# Patient Record
Sex: FEMALE | Race: WHITE | Employment: FULL TIME | URBAN - METROPOLITAN AREA
[De-identification: names, ages, dates, MRNs, and addresses within clinical notes are randomized per-mention and may not be internally consistent; named-entity substitution may affect disease eponyms.]

---

## 2019-03-30 LAB
AVERAGE GLUCOSE: NORMAL
CREATININE, URINE: 118.49
HBA1C MFR BLD: 11.1 %
MICROALBUMIN/CREAT 24H UR: 2.5 MG/G{CREAT}
MICROALBUMIN/CREAT UR-RTO: 21.1

## 2020-02-03 ENCOUNTER — HOSPITAL ENCOUNTER (OUTPATIENT)
Age: 50
Discharge: HOME | End: 2020-02-03
Payer: COMMERCIAL

## 2020-02-03 LAB — RUBV IGG SER QL: 32.5 IU/ML

## 2020-02-03 PROCEDURE — 86787 VARICELLA-ZOSTER ANTIBODY: CPT

## 2020-02-03 PROCEDURE — 86481 TB AG RESPONSE T-CELL SUSP: CPT

## 2020-02-03 PROCEDURE — 86765 RUBEOLA ANTIBODY: CPT

## 2020-02-03 PROCEDURE — 86762 RUBELLA ANTIBODY: CPT

## 2020-02-03 PROCEDURE — 86735 MUMPS ANTIBODY: CPT

## 2020-02-05 LAB
MEASLES IMMUNE (IGG): 0.63
MUV IGG SER QL: 0.47
VZV IGG SER QL IA: 2.59

## 2020-02-06 LAB — T-SPOT TB TEST: NORMAL

## 2020-02-27 ENCOUNTER — HOSPITAL ENCOUNTER (OUTPATIENT)
Age: 50
Discharge: HOME | End: 2020-02-27
Payer: COMMERCIAL

## 2020-02-27 LAB — HCG, PREGNANCY URINE (POC): NEGATIVE

## 2020-02-27 PROCEDURE — 86481 TB AG RESPONSE T-CELL SUSP: CPT

## 2020-02-27 PROCEDURE — 81025 URINE PREGNANCY TEST: CPT

## 2020-03-01 LAB — T-SPOT TB TEST: NORMAL

## 2020-03-23 RX ORDER — INSULIN LISPRO 100 [IU]/ML
10 INJECTION, SOLUTION INTRAVENOUS; SUBCUTANEOUS
COMMUNITY
Start: 2019-05-22 | End: 2020-03-23 | Stop reason: SDUPTHER

## 2020-03-23 RX ORDER — ATORVASTATIN CALCIUM 20 MG/1
20 TABLET, FILM COATED ORAL DAILY
COMMUNITY
Start: 2019-05-28 | End: 2020-03-23 | Stop reason: SDUPTHER

## 2020-03-23 RX ORDER — GEMFIBROZIL 600 MG/1
TABLET, FILM COATED ORAL
COMMUNITY
Start: 2019-08-07 | End: 2020-03-23 | Stop reason: SDUPTHER

## 2020-03-23 RX ORDER — LISINOPRIL AND HYDROCHLOROTHIAZIDE 12.5; 1 MG/1; MG/1
1 TABLET ORAL DAILY
COMMUNITY
Start: 2019-05-22 | End: 2020-03-23 | Stop reason: SDUPTHER

## 2020-03-23 RX ORDER — INSULIN GLARGINE 100 [IU]/ML
INJECTION, SOLUTION SUBCUTANEOUS
COMMUNITY
Start: 2020-02-03 | End: 2020-03-23 | Stop reason: SDUPTHER

## 2020-03-23 RX ORDER — SPIRONOLACTONE 25 MG/1
25 TABLET ORAL DAILY
COMMUNITY
Start: 2019-05-22 | End: 2020-03-23 | Stop reason: SDUPTHER

## 2020-03-23 RX ORDER — LEVOTHYROXINE SODIUM 88 UG/1
TABLET ORAL
COMMUNITY
Start: 2019-11-25 | End: 2020-03-23 | Stop reason: SDUPTHER

## 2020-03-23 RX ORDER — GLIPIZIDE 10 MG/1
10 TABLET ORAL
COMMUNITY
Start: 2019-05-22 | End: 2020-03-23 | Stop reason: SDUPTHER

## 2020-03-24 ENCOUNTER — TELEPHONE (OUTPATIENT)
Dept: PHARMACY | Facility: CLINIC | Age: 50
End: 2020-03-24

## 2020-03-25 NOTE — TELEPHONE ENCOUNTER
Per [facility]:  DX: DM Type Two  2/19/19: DM OV: Lainey Roman, APRN-CNP  5/30/19: DM ED: JOSHUA Grossman

## 2020-04-06 NOTE — TELEPHONE ENCOUNTER
Received patients [address] [place] [address] enrollment form and Page one of the DM Program application. [address] form faxed to [address] [place] [address] with confirmation received. E-mail sent to patient advising her of the above information and requesting the complete DM Program application before 3/18/41 for enrollment into the program on 7/01/20.

## 2020-04-17 RX ORDER — ATORVASTATIN CALCIUM 20 MG/1
20 TABLET, FILM COATED ORAL DAILY
Qty: 30 TABLET | Refills: 1 | Status: SHIPPED | OUTPATIENT
Start: 2020-04-17 | End: 2020-07-14 | Stop reason: SDUPTHER

## 2020-04-17 RX ORDER — GEMFIBROZIL 600 MG/1
600 TABLET, FILM COATED ORAL 2 TIMES DAILY
Qty: 60 TABLET | Refills: 1 | Status: SHIPPED | OUTPATIENT
Start: 2020-04-17 | End: 2020-08-17

## 2020-04-17 RX ORDER — LEVOTHYROXINE SODIUM 88 UG/1
TABLET ORAL
Qty: 30 TABLET | Refills: 1 | Status: SHIPPED | OUTPATIENT
Start: 2020-04-17 | End: 2020-08-17 | Stop reason: SDUPTHER

## 2020-04-17 RX ORDER — LISINOPRIL AND HYDROCHLOROTHIAZIDE 12.5; 1 MG/1; MG/1
1 TABLET ORAL DAILY
Qty: 30 TABLET | Refills: 1 | Status: SHIPPED | OUTPATIENT
Start: 2020-04-17 | End: 2020-08-17 | Stop reason: SDUPTHER

## 2020-04-17 RX ORDER — SPIRONOLACTONE 25 MG/1
25 TABLET ORAL DAILY
Qty: 30 TABLET | Refills: 1 | Status: SHIPPED | OUTPATIENT
Start: 2020-04-17 | End: 2020-08-17

## 2020-04-17 RX ORDER — GLIPIZIDE 10 MG/1
10 TABLET ORAL
Qty: 60 TABLET | Refills: 1 | Status: SHIPPED | OUTPATIENT
Start: 2020-04-17 | End: 2020-08-17

## 2020-04-17 NOTE — TELEPHONE ENCOUNTER
Patient requesting 90 day supply as it is most cost effective. Per patient benefit, [facility] is now the preferred pharmacy for all maintenance medications. Please update patient's preferred pharmacy in your records. Please call [phone] with any questions.

## 2020-04-30 ENCOUNTER — TELEMEDICINE (OUTPATIENT)
Dept: FAMILY MEDICINE CLINIC | Age: 50
End: 2020-04-30
Payer: COMMERCIAL

## 2020-04-30 VITALS — BODY MASS INDEX: 34.8 KG/M2 | WEIGHT: 235 LBS | HEIGHT: 69 IN

## 2020-04-30 PROCEDURE — 99202 OFFICE O/P NEW SF 15 MIN: CPT | Performed by: INTERNAL MEDICINE

## 2020-04-30 RX ORDER — LANCETS 30 GAUGE
1 EACH MISCELLANEOUS 3 TIMES DAILY
Qty: 600 EACH | Refills: 1 | Status: SHIPPED | OUTPATIENT
Start: 2020-04-30 | End: 2020-09-25 | Stop reason: SDUPTHER

## 2020-04-30 RX ORDER — BLOOD-GLUCOSE METER
1 KIT MISCELLANEOUS 3 TIMES DAILY
Qty: 1 KIT | Refills: 0 | Status: SHIPPED | OUTPATIENT
Start: 2020-04-30 | End: 2020-09-27 | Stop reason: SDUPTHER

## 2020-04-30 RX ORDER — PEN NEEDLE, DIABETIC 31 GX5/16"
1 NEEDLE, DISPOSABLE MISCELLANEOUS DAILY
Qty: 100 EACH | Refills: 11 | Status: SHIPPED | OUTPATIENT
Start: 2020-04-30 | End: 2020-08-17 | Stop reason: SDUPTHER

## 2020-04-30 ASSESSMENT — ENCOUNTER SYMPTOMS
CONSTIPATION: 0
SHORTNESS OF BREATH: 0
NAUSEA: 0
BLOOD IN STOOL: 0
BACK PAIN: 0
CHOKING: 0
ABDOMINAL PAIN: 0
CHEST TIGHTNESS: 0
COUGH: 0
ANAL BLEEDING: 0
RECTAL PAIN: 0
WHEEZING: 0
DIARRHEA: 0

## 2020-04-30 ASSESSMENT — PATIENT HEALTH QUESTIONNAIRE - PHQ9
2. FEELING DOWN, DEPRESSED OR HOPELESS: 0
SUM OF ALL RESPONSES TO PHQ QUESTIONS 1-9: 0
SUM OF ALL RESPONSES TO PHQ QUESTIONS 1-9: 0
SUM OF ALL RESPONSES TO PHQ9 QUESTIONS 1 & 2: 0
1. LITTLE INTEREST OR PLEASURE IN DOING THINGS: 0

## 2020-05-04 ENCOUNTER — TELEPHONE (OUTPATIENT)
Dept: FAMILY MEDICINE CLINIC | Age: 50
End: 2020-05-04

## 2020-05-04 NOTE — TELEPHONE ENCOUNTER
Pharmacy called stating that the lancets was prescribed to the patient because the other rx is not covered by patients insurance.

## 2020-06-04 NOTE — TELEPHONE ENCOUNTER
Tk20 account created. Patient still missing DM Program Application and Lipid Panel - ordered on 4/30/20.     E-mail and DM Program Applications sent to patient at the e-mail on file: [email or web address].

## 2020-06-26 ENCOUNTER — HOSPITAL ENCOUNTER (OUTPATIENT)
Age: 50
Discharge: HOME | End: 2020-06-26
Payer: COMMERCIAL

## 2020-06-26 ENCOUNTER — HOSPITAL ENCOUNTER (OUTPATIENT)
Dept: MAMMOGRAPHY | Age: 50
Discharge: HOME | End: 2020-06-28
Payer: COMMERCIAL

## 2020-06-26 LAB
ALBUMIN SERPL-MCNC: 4.2 G/DL (ref 3.5–5.2)
ALBUMIN/GLOBULIN RATIO: 1.4 (ref 1–2.5)
ALP BLD-CCNC: 80 U/L (ref 35–104)
ALT SERPL-CCNC: 24 U/L (ref 5–33)
ANION GAP SERPL CALCULATED.3IONS-SCNC: 17 MMOL/L (ref 9–17)
AST SERPL-CCNC: 20 U/L
BILIRUB SERPL-MCNC: 0.24 MG/DL (ref 0.3–1.2)
BUN BLDV-MCNC: 13 MG/DL (ref 6–20)
BUN/CREAT BLD: ABNORMAL (ref 9–20)
CALCIUM SERPL-MCNC: 9.4 MG/DL (ref 8.6–10.4)
CHLORIDE BLD-SCNC: 94 MMOL/L (ref 98–107)
CHOLESTEROL, FASTING: 238 MG/DL
CHOLESTEROL/HDL RATIO: 11.3
CO2: 19 MMOL/L (ref 20–31)
CREAT SERPL-MCNC: 0.49 MG/DL (ref 0.5–0.9)
CREATININE URINE: 70.9 MG/DL (ref 28–217)
ESTIMATED AVERAGE GLUCOSE: 295 MG/DL
GFR AFRICAN AMERICAN: >60 ML/MIN
GFR NON-AFRICAN AMERICAN: >60 ML/MIN
GFR SERPL CREATININE-BSD FRML MDRD: ABNORMAL ML/MIN/{1.73_M2}
GFR SERPL CREATININE-BSD FRML MDRD: ABNORMAL ML/MIN/{1.73_M2}
GLUCOSE BLD-MCNC: 337 MG/DL (ref 70–99)
HBA1C MFR BLD: 11.9 % (ref 4–6)
HCT VFR BLD CALC: 37.5 % (ref 36.3–47.1)
HDLC SERPL-MCNC: 21 MG/DL
HEMOGLOBIN: 13.2 G/DL (ref 11.9–15.1)
LDL CHOLESTEROL DIRECT: 52 MG/DL
LDL CHOLESTEROL: ABNORMAL MG/DL (ref 0–130)
MCH RBC QN AUTO: 28.9 PG (ref 25.2–33.5)
MCHC RBC AUTO-ENTMCNC: 35.2 G/DL (ref 28.4–34.8)
MCV RBC AUTO: 82.1 FL (ref 82.6–102.9)
MICROALBUMIN/CREAT 24H UR: 68 MG/L
MICROALBUMIN/CREAT UR-RTO: 96 MCG/MG CREAT
NRBC AUTOMATED: 0 PER 100 WBC
PDW BLD-RTO: 12.9 % (ref 11.8–14.4)
PLATELET # BLD: 331 K/UL (ref 138–453)
PMV BLD AUTO: 9.8 FL (ref 8.1–13.5)
POTASSIUM SERPL-SCNC: 4 MMOL/L (ref 3.7–5.3)
RBC # BLD: 4.57 M/UL (ref 3.95–5.11)
SODIUM BLD-SCNC: 130 MMOL/L (ref 135–144)
TOTAL PROTEIN: 7.1 G/DL (ref 6.4–8.3)
TRIGLYCERIDE, FASTING: 2064 MG/DL
VLDLC SERPL CALC-MCNC: ABNORMAL MG/DL (ref 1–30)
WBC # BLD: 7.1 K/UL (ref 3.5–11.3)

## 2020-06-26 PROCEDURE — 36415 COLL VENOUS BLD VENIPUNCTURE: CPT

## 2020-06-26 PROCEDURE — 82043 UR ALBUMIN QUANTITATIVE: CPT

## 2020-06-26 PROCEDURE — 85027 COMPLETE CBC AUTOMATED: CPT

## 2020-06-26 PROCEDURE — 77063 BREAST TOMOSYNTHESIS BI: CPT

## 2020-06-26 PROCEDURE — 83036 HEMOGLOBIN GLYCOSYLATED A1C: CPT

## 2020-06-26 PROCEDURE — 83721 ASSAY OF BLOOD LIPOPROTEIN: CPT

## 2020-06-26 PROCEDURE — 82570 ASSAY OF URINE CREATININE: CPT

## 2020-06-26 PROCEDURE — 80053 COMPREHEN METABOLIC PANEL: CPT

## 2020-06-26 PROCEDURE — 80061 LIPID PANEL: CPT

## 2020-07-14 ENCOUNTER — OFFICE VISIT (OUTPATIENT)
Dept: FAMILY MEDICINE CLINIC | Age: 50
End: 2020-07-14
Payer: COMMERCIAL

## 2020-07-14 VITALS
OXYGEN SATURATION: 97 % | SYSTOLIC BLOOD PRESSURE: 132 MMHG | HEIGHT: 69 IN | BODY MASS INDEX: 34.07 KG/M2 | TEMPERATURE: 95.6 F | HEART RATE: 73 BPM | WEIGHT: 230 LBS | RESPIRATION RATE: 16 BRPM | DIASTOLIC BLOOD PRESSURE: 76 MMHG

## 2020-07-14 PROCEDURE — 99214 OFFICE O/P EST MOD 30 MIN: CPT | Performed by: INTERNAL MEDICINE

## 2020-07-14 RX ORDER — INSULIN GLARGINE 100 [IU]/ML
40 INJECTION, SOLUTION SUBCUTANEOUS 2 TIMES DAILY
Qty: 15 PEN | Refills: 5 | Status: SHIPPED | OUTPATIENT
Start: 2020-07-14 | End: 2020-09-29 | Stop reason: SDUPTHER

## 2020-07-14 RX ORDER — ATORVASTATIN CALCIUM 40 MG/1
40 TABLET, FILM COATED ORAL DAILY
Qty: 30 TABLET | Refills: 3 | Status: SHIPPED | OUTPATIENT
Start: 2020-07-14 | End: 2020-07-14 | Stop reason: SDUPTHER

## 2020-07-14 RX ORDER — ATORVASTATIN CALCIUM 40 MG/1
40 TABLET, FILM COATED ORAL DAILY
Qty: 90 TABLET | Refills: 5 | Status: SHIPPED | OUTPATIENT
Start: 2020-07-14 | End: 2021-01-21

## 2020-07-14 RX ORDER — FLUCONAZOLE 100 MG/1
100 TABLET ORAL DAILY
Qty: 3 TABLET | Refills: 5 | Status: SHIPPED | OUTPATIENT
Start: 2020-07-14 | End: 2020-07-17

## 2020-07-14 RX ORDER — INSULIN GLARGINE 100 [IU]/ML
40 INJECTION, SOLUTION SUBCUTANEOUS 2 TIMES DAILY
Qty: 5 PEN | Refills: 5 | Status: SHIPPED | OUTPATIENT
Start: 2020-07-14 | End: 2020-07-14 | Stop reason: SDUPTHER

## 2020-07-14 ASSESSMENT — ENCOUNTER SYMPTOMS
WHEEZING: 0
ABDOMINAL PAIN: 0
COUGH: 0
CONSTIPATION: 0
STRIDOR: 0
SORE THROAT: 0
SHORTNESS OF BREATH: 0
DIARRHEA: 0
NAUSEA: 0
CHEST TIGHTNESS: 0
ANAL BLEEDING: 0
CHOKING: 0
VOMITING: 0
BACK PAIN: 0
ABDOMINAL DISTENTION: 0
BLOOD IN STOOL: 0
BLURRED VISION: 1

## 2020-07-14 NOTE — PROGRESS NOTES
Visit Information    Have you changed or started any medications since your last visit including any over-the-counter medicines, vitamins, or herbal medicines? no   Are you having any side effects from any of your medications? -  no  Have you stopped taking any of your medications? Is so, why? -  no    Have you seen any other physician or provider since your last visit? No  Have you had any other diagnostic tests since your last visit? No  Have you been seen in the emergency room and/or had an admission to a hospital since we last saw you? No  Have you had your routine dental cleaning in the past 6 months? no    Have you activated your [employer] account? If not, what are your barriers?  Yes     Patient Care Team:  Kateryna Humphries DO as PCP - General (Family Medicine)  Kateryna Humphries DO as PCP - [facility]    Medical History Review  Past Medical, Family, and Social History reviewed and does contribute to the patient presenting condition    Health Maintenance   Topic Date Due    Diabetic foot exam  03/28/1981    Diabetic retinal exam  03/28/1981    HIV screen  03/28/1986    Cervical cancer screen  03/28/1992    Hepatitis B vaccine (2 of 3 - Risk 3-dose series) 03/08/1993    Flu vaccine (1) 09/01/2020    A1C test (Diabetic or Prediabetic)  09/26/2020    Diabetic microalbuminuria test  06/26/2021    Lipid screen  06/26/2021    Potassium monitoring  06/26/2021    Creatinine monitoring  06/26/2021    DTaP/Tdap/Td vaccine (4 - Td) 02/26/2030    Pneumococcal 0-64 years Vaccine  Completed    Hepatitis A vaccine  Aged Out    Hib vaccine  Aged Out    Meningococcal (ACWY) vaccine  Aged Out

## 2020-07-14 NOTE — PROGRESS NOTES
[address] [facility]  [address] [place] [address]  Dept: [phone]  Dept Fax: [phone]    Araceli Valenzuela a 53 y.o. female who presents today for her medical conditions/complaints as notedbelow. 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 so would get fast food       Also has bene getting recurrent yeast infections typically second day of period that become severe to cuase raw and itchy skin   Goes all the way to her bottom   Just started doing nightly walks every night for about 2.5 miles so getting back to routine   Had blood work about 3 weeks ago now and a1c was 11.9  tgs were over 2000, it does not appear they have ever been this high but she states it is a chronic issue   Endo had started the lipitor, ldl normal   She is on lantus 60 units nightly, used to take bid   Also on glipizde and metformin   Had colten LINARES a few times but did not like her /get along with her     Diabetes   She presents for her follow-up diabetic visit. She has type 2 diabetes mellitus. Her disease course has been worsening. There are no hypoglycemic associated symptoms. Pertinent negatives for hypoglycemia include no dizziness or headaches. Associated symptoms include blurred vision and polydipsia. Pertinent negatives for diabetes include no chest pain, no fatigue, no foot ulcerations, no polyphagia and no polyuria. There are no hypoglycemic complications. There are no diabetic complications.  Risk factors for coronary artery disease include dyslipidemia, diabetes mellitus, family history, obesity, hypertension, stress and sedentary lifestyle. Current diabetic treatment includes insulin injections and oral agent (dual therapy). She is compliant with treatment most of the time. Her weight is stable. She is following a generally unhealthy diet. Meal planning includes avoidance of concentrated sweets. She has not had a previous visit with a dietitian. She participates in exercise daily. An ACE inhibitor/angiotensin II receptor blocker is being taken. Hyperlipidemia   This is a chronic problem. The current episode started more than 1 year ago. The problem is uncontrolled. Recent lipid tests were reviewed and are variable. Exacerbating diseases include diabetes and obesity. She has no history of chronic renal disease, hypothyroidism or liver disease. Factors aggravating her hyperlipidemia include thiazides and fatty foods. Pertinent negatives include no chest pain or shortness of breath. Current antihyperlipidemic treatment includes statins, exercise and bile acid squestrants. The current treatment provides moderate improvement of lipids. Compliance problems include adherence to diet and psychosocial issues. Risk factors for coronary artery disease include diabetes mellitus, dyslipidemia, family history, hypertension, obesity, stress and a sedentary lifestyle. Vaginal Discharge   The patient's primary symptoms include genital itching, a genital odor, a genital rash and vaginal discharge. The patient's pertinent negatives include no genital lesions, missed menses or pelvic pain. This is a recurrent problem. The current episode started more than 1 month ago. The problem occurs intermittently. The problem has been gradually worsening. The pain is severe. The problem affects both sides. She is not pregnant. Associated symptoms include frequency.  Pertinent negatives include no abdominal pain, anorexia, back pain, chills, constipation, diarrhea, discolored urine, fever, headaches, joint swelling, nausea, painful intercourse, rash, sore throat, urgency or vomiting. The vaginal discharge was white and thick. The vaginal bleeding is typical of menses. She has not been passing clots. She has not been passing tissue. She has tried antifungals and heating pads for the symptoms. The treatment provided mild relief. She is sexually active. No, her partner does not have an STD. Her menstrual history has been regular. There is no history of an abdominal surgery, a  section, an ectopic pregnancy, endometriosis, a gynecological surgery, menorrhagia, metrorrhagia or miscarriage. Hemoglobin A1C (%)   Date Value   2020 11.9 (H)   2019 11.1         ( goal A1C is < 7)   Microalb/Crt. Ratio (mcg/mg creat)   Date Value   2020 96 (H)     LDL Cholesterol (mg/dL)   Date Value   2020            (goal LDL is <100)   AST (U/L)   Date Value   2020 20     ALT (U/L)   Date Value   2020 24     BUN (mg/dL)   Date Value   2020 13     BP Readings from Last 3 Encounters:   20 132/76          (goal 120/80)    No past medical history on file. No past surgical history on file. No family history on file. Social History     Tobacco Use    Smoking status: Not on file   Substance Use Topics    Alcohol use: Not on file      Current Outpatient Medications   Medication Sig Dispense Refill    fluconazole (DIFLUCAN) 100 MG tablet Take 1 tablet by mouth daily for 3 days 3 tablet 5    insulin glargine (LANTUS SOLOSTAR) 100 UNIT/ML injection pen Inject 40 Units into the skin 2 times daily 15 pen 5    atorvastatin (LIPITOR) 40 MG tablet Take 1 tablet by mouth daily 90 tablet 5    Omeprazole 20 MG TBDD Take 20 mg by mouth daily 30 tablet 1    glucose monitoring kit (FREESTYLE) monitoring kit 1 kit by Does not apply route 3 times daily 1 kit 0    blood glucose test strips (ASCENSIA AUTODISC VI;ONE TOUCH ULTRA TEST VI) strip Use with associated glucose meter.  600 strip 11    Lancets MISC 1 blurred vision. Negative for visual disturbance. Respiratory: Negative for cough, choking, chest tightness, shortness of breath, wheezing and stridor. Cardiovascular: Negative for chest pain, palpitations and leg swelling. Gastrointestinal: Negative for abdominal distention, abdominal pain, anal bleeding, anorexia, blood in stool, constipation, diarrhea, nausea and vomiting. Endocrine: Positive for polydipsia. Negative for polyphagia and polyuria. Genitourinary: Positive for frequency and vaginal discharge. Negative for menorrhagia, missed menses, pelvic pain and urgency. Musculoskeletal: Negative for arthralgias and back pain. Skin: Negative for rash. Allergic/Immunologic: Positive for immunocompromised state. Neurological: Negative for dizziness, light-headedness and headaches. Hematological: Negative for adenopathy. Does not bruise/bleed easily. Psychiatric/Behavioral: Negative for sleep disturbance. Objective:      Physical Exam  Vitals signs and nursing note reviewed. Constitutional:       General: She is not in acute distress. Appearance: Normal appearance. She is well-developed. She is obese. She is not ill-appearing, toxic-appearing or diaphoretic. Comments: Chronically ill  Thin    HENT:      Head: Normocephalic and atraumatic. Mouth/Throat:      Mouth: Mucous membranes are moist.   Neck:      Musculoskeletal: Normal range of motion and neck supple. No neck rigidity. Thyroid: No thyroid mass, thyromegaly or thyroid tenderness. Vascular: No carotid bruit or JVD. Trachea: Trachea and phonation normal.   Cardiovascular:      Rate and Rhythm: Normal rate and regular rhythm. No extrasystoles are present. Pulses: Normal pulses. Heart sounds: Normal heart sounds, S1 normal and S2 normal. Heart sounds not distant. No murmur. No friction rub. No gallop.     Pulmonary:      Effort: Pulmonary effort is normal. No bradypnea, accessory muscle usage, prolonged expiration, respiratory distress or retractions. Breath sounds: Normal breath sounds and air entry. No stridor, decreased air movement or transmitted upper airway sounds. No decreased breath sounds, wheezing, rhonchi or rales. Abdominal:      General: Bowel sounds are normal.      Palpations: Abdomen is soft. There is no hepatomegaly or splenomegaly. Tenderness: There is no abdominal tenderness. Musculoskeletal:      Right shoulder: She exhibits no tenderness, no bony tenderness, no pain, normal pulse and normal strength. Left knee: She exhibits normal range of motion, no swelling, no effusion, no ecchymosis, no deformity, no laceration and no erythema. No tenderness found. Lumbar back: She exhibits normal range of motion, no tenderness, no bony tenderness, no swelling, no edema, no deformity, no pain and no spasm. Right lower leg: No edema. Left lower leg: No edema. Lymphadenopathy:      Cervical: No cervical adenopathy. Skin:     General: Skin is warm and dry. Capillary Refill: Capillary refill takes less than 2 seconds. Findings: No rash. Neurological:      General: No focal deficit present. Mental Status: She is alert and oriented to person, place, and time. Cranial Nerves: Cranial nerves are intact. No cranial nerve deficit. Sensory: Sensory deficit present. Motor: Weakness, atrophy and abnormal muscle tone present. Coordination: Coordination is intact. Coordination normal.      Gait: Gait abnormal.      Deep Tendon Reflexes:      Reflex Scores:       Patellar reflexes are 1+ on the right side and 1+ on the left side. Achilles reflexes are 2+ on the right side. Psychiatric:         Mood and Affect: Mood normal.         Behavior: Behavior normal.         Thought Content:  Thought content normal.         Judgment: Judgment normal.       /76 (Site: Right Upper Arm, Position: Sitting, Cuff Size: Medium Adult)   Pulse 73   Temp 95.6 °F (35.3 °C) (Tympanic)   Resp 16   Ht 5' 9\" (1.753 m)   Wt 230 lb (104.3 kg)   LMP 07/09/2020 (Approximate)   SpO2 97%   BMI 33.97 kg/m²     Assessment:       Diagnosis Orders   1. Type 2 diabetes mellitus without complication, with long-term current use of insulin ([address]  [facility]) Medication Mgmt (Diabetes - [address]   2. Recurrent vaginitis  Mark Ruiz, [facility], MD, OB/GYN, [facility]:       Return in about 10 weeks (around 9/22/2020), or if symptoms worsen or fail to improve, for routine DM, bp check.     Orders Placed This Encounter   Procedures   [address] Medication Mgmt (Diabetes - Clinical Pharmacy) - [facility]     Referral Priority:   Routine     Referral Type:   Consult for Advice and Opinion     Referral Reason:   Specialty Services Required     Requested Specialty:   Pharmacist     Number of Visits Requested:   1     Expiration Date:   7/14/2022   Gregor Davalos MD, OB/GYN, [facility]     Referral Priority:   Routine     Referral Type:   Eval and Treat     Referral Reason:   Specialty Services Required     Referred to Provider:   Tabitha Sutherland MD     Requested Specialty:   Obstetrics & Gynecology     Number of Visits Requested:   1     Orders Placed This Encounter   Medications    DISCONTD: insulin glargine (LANTUS SOLOSTAR) 100 UNIT/ML injection pen     Sig: Inject 40 Units into the skin 2 times daily     Dispense:  5 pen     Refill:  5    DISCONTD: atorvastatin (LIPITOR) 40 MG tablet     Sig: Take 1 tablet by mouth daily     Dispense:  30 tablet     Refill:  3    fluconazole (DIFLUCAN) 100 MG tablet     Sig: Take 1 tablet by mouth daily for 3 days     Dispense:  3 tablet     Refill:  5    insulin glargine (LANTUS SOLOSTAR) 100 UNIT/ML injection pen     Sig: Inject 40 Units into the skin 2 times daily     Dispense:  15 pen     Refill:  5    atorvastatin (LIPITOR) 40 MG tablet     Sig: Take 1 tablet by mouth daily Dispense:  90 tablet     Refill:  5    increase lipitor to 04 mg once daily   Change lantus to 40 units BID will taper up from 35 to 40 over the next few weeks  Refer to [facility] DM med program, consider free style mark  Also do omega 3s otc     Referral to gyn for recurrent yeast infections   Possible due to change in [address] and uncontrolled DM  diflucan FOR FIRST 3 DAYS EACH PERIOD  ALSO start a probiotic     Also add omega 3s 3 capsules BID to help with TGS  No hx of pancreatitis     F/u in 10 weeks for a1c check   And will also order sodium and lipid panel for recheck   Call with q/c    Patientgiven educational materials - see patient instructions. Discussed use, benefit,and side effects of prescribed medications. All patient questions answered. Ptvoiced understanding. Reviewed health maintenance. Instructed to continue currentmedications, diet and exercise. Patient agreed with treatment plan. Follow up asdirected.      Electronically signed by Dilcia Goodman DO on 7/14/2020 at 9:27 AM

## 2020-08-11 ENCOUNTER — TELEPHONE (OUTPATIENT)
Dept: PHARMACY | Age: 50
End: 2020-08-11

## 2020-08-13 ENCOUNTER — HOSPITAL ENCOUNTER (OUTPATIENT)
Dept: PHARMACY | Age: 50
Setting detail: THERAPIES SERIES
Discharge: HOME | End: 2020-08-13
Payer: COMMERCIAL

## 2020-08-13 VITALS
TEMPERATURE: 96.8 F | SYSTOLIC BLOOD PRESSURE: 114 MMHG | OXYGEN SATURATION: 98 % | BODY MASS INDEX: 33.95 KG/M2 | WEIGHT: 229.9 LBS | HEART RATE: 86 BPM | DIASTOLIC BLOOD PRESSURE: 76 MMHG

## 2020-08-13 PROCEDURE — 99212 OFFICE O/P EST SF 10 MIN: CPT

## 2020-08-13 RX ORDER — ICOSAPENT ETHYL 1000 MG/1
2 CAPSULE ORAL 2 TIMES DAILY
Qty: 360 CAPSULE | Refills: 2 | Status: SHIPPED | OUTPATIENT
Start: 2020-08-13 | End: 2020-11-11

## 2020-08-13 NOTE — PROGRESS NOTES
Diabetic Medication Management Program  [address]. [facility], [address] [facility]  Phone: [phone]  Fax: [phone]    NAME: Minerva Ponce RECORD NUMBER:  [id number]  AGE: 53 y.o. GENDER: female  : 1970  EPISODE DATE:  2020       Ms. Concetta Boeck was referred to [facility] Medication Management Services by Dr. Lori Cannon. Patient acknowledges working in consult agreement with clinical pharmacist and this provider. Goals per referral:   Fasting blood glucose: < 130  Peak postprandial glucose: < 180  A1C: < 7    SUBJECTIVE     Ms. Concetta Boeck is a 53 y.o. female here for the Diabetes Service for self-management education, medication review including over the counter medications and herbal products, overall wellbeing assessment, transition of care and any needed adjustments with updates and recommendations communicated to the referring physician. Patient Findings:  []  Missed doses   []  Emergency Room Visit or Hospitalization    []  Medication changes   []  Diet changes   []  Acute illness   []  Activity changes      Symptoms of hypoglycemia   [x]  None    []  Shakiness    []  Lightheadedness or dizziness   []  Confusion      []  Sweating   []  Other       Symptoms of hyperglycemia   []  None   []  Frequent urination    [x]  Increased thirst   []  Other    Medication adverse reactions   []  None   [x]  Diarrhea / Nausea / Vomiting / Constipation / flatulence - Discontinued Bydureon d/t this but patient feeling this was induced by eating poorly as well   []  Hypertension   []  Peripheral edema     []  Signs of infection   []  Headache   []  Vision changes   []  Increased cholesterol    []  Weight gain   []  Change in renal function   []  Increased potassium  []  Other       OBJECTIVE     PMHx:  No past medical history on file.     Social History:    Social History     Tobacco Use    Smoking status: Not on file   Substance Use Topics    Alcohol use: Not on file     Pertinent Labs:    Recent A1c: Next due 9/26/20    Lab Results   Component Value Date    LABA1C 11.9 (H) 06/26/2020    LABA1C 11.1 03/30/2019     Lab Results   Component Value Date    HDL 21 (L) 06/26/2020     Lab Results   Component Value Date    CREATININE 0.49 (L) 06/26/2020    BUN 13 06/26/2020     (L) 06/26/2020    K 4.0 06/26/2020    CL 94 (L) 06/26/2020    CO2 19 (L) 06/26/2020     Lab Results   Component Value Date    ALT 24 06/26/2020     Weight:  Wt Readings from Last 3 Encounters:   08/13/20 229 lb 14.4 oz (104.3 kg)   07/14/20 230 lb (104.3 kg)   04/30/20 235 lb (106.6 kg)     Current medications:  Prior to Admission medications    Medication Sig Start Date End Date Taking? Authorizing Provider   insulin glargine (LANTUS SOLOSTAR) 100 UNIT/ML injection pen Inject 40 Units into the skin 2 times daily 7/14/20   [facility], DO   atorvastatin (LIPITOR) 40 MG tablet Take 1 tablet by mouth daily 7/14/20   [facility], DO   Omeprazole 20 MG TBDD Take 20 mg by mouth daily 7/6/20   [facility], DO   glucose monitoring kit (FREESTYLE) monitoring kit 1 kit by Does not apply route 3 times daily 4/30/20   [facility], DO   blood glucose test strips (ASCENSIA AUTODISC VI;ONE TOUCH ULTRA TEST VI) strip Use with associated glucose meter.  4/30/20   [facility], DO   Lancets MISC 1 each by Does not apply route 3 times daily 4/30/20   [facility], DO   Insulin Pen Needle (B-D ULTRAFINE III SHORT PEN) 31G X 8 MM MISC Inject 1 each into the skin daily May substitute with any brand 4/30/20   [facility], DO   spironolactone (ALDACTONE) 25 MG tablet Take 1 tablet by mouth daily 4/17/20   [facility], DO   glipiZIDE (GLUCOTROL) 10 MG tablet Take 1 tablet by mouth 2 times daily (before meals) 4/17/20   [facility], DO   levothyroxine (SYNTHROID) 88 MCG tablet TAKE 1 TABLET BY MOUTH  DAILY 4/17/20   [facility], DO   gemfibrozil (LOPID) 600 MG tablet Take 1 tablet by mouth 2 times daily 4/17/20   Lorna sugar levels at home? No - has not tested BG in a couple of months  What is your target A1c? <7     LOW blood sugar (<70mg/dL) HIGH blood sugar (>180mg/dL)   How often in last month? Never Every day   What are your symptoms? Did not address Frequent Thirst   How do you treat? n/a Did not address     Do you carry a source of fast-acting glucose in case of low blood sugar? Did not address  Do you currently take medications for your diabetes? Yes - see med list       Do you ever skip your diabetes medications? No      How many times in the past year have you been in the hospital or ER because of diabetes? Did not address     OTHER MEDICAL HISTORY    How would you rate your overall current health? Feel like crap all the time  Do you perform regular foot care? Yes - does not have neuropathy yet but does have wierd sensation when tapping foot     Date of last medical foot exam: Unknown    Date of last eye exam: Patient has eye issues including double vision and need for bifocals   Date of last dental exam: Unknown     Immunizations:   Most Recent Immunizations   Administered Date(s) Administered    DT (pediatric) 01/01/1998    Hepatitis B 02/08/1993    Influenza A ([id number]) Vaccine PF IM 10/28/2009    Influenza Virus Vaccine 10/08/2016    Influenza, MDCK Quadv, IM, PF (Flucelvax 4 yrs and older) 01/08/2020    Influenza, Quadv, IM, PF (6 mo and older Fluzone, Flulaval, Fluarix, and 3 yrs and older Afluria) 02/01/2019    Pneumococcal Polysaccharide ([id number]) 11/20/2015    Tdap (Boostrix, Adacel) 02/26/2020     LIFESTYLE    Do you have a history of tobacco use? Did not address     Do you drink alcohol? No     If yes, how much per day:  None     What barriers have stopped you from making changes in the past?  Stress - very busy work schedule - no help at home with meal prep       NUTRITION  Are you currently following any type of meal plan or diet (ex: high protein, high fat, low carb, vegan, etc)?  Trying to cut back on carbs making better choices   If yes, please describe: _____________________________________________________  What times do you typically eat your meals/snacks? Breakfast time:about 8am  Lunch time: between 12pm-2pm  Dinner time: very late recently not until 9-10pm d/t getting home late from work  [employer]) time(s): graze on [employer], [employer], amina  Do you drink sugared beverages? No  - cut back to 1 diet Mtn Dew every other day from previously a 12 pack per day. Now drinks unsweetened iced tea. Has your weight changed in the last year? She states she is losing weight   What is your desired body weight? Did not address  Have you ever participated in a weight loss program?  Did not address        EXERCISE  What type of exercise(s) do you do? []  I do not exercise  [x]  Walking - trying to do better - walking around at work all day  []  Aerobics  []  Running  []  Swimming  []  Strength training  []  Other:   How would you rate your physical activity level? [] Inactive: no regular physical activity  [x] Light: no regular physical activity, but 3-4 hours of walking or standing each day  [] Moderate: occasional physical activity during the week such as housework/yard work  [] Heavy: consistent physical activity 3 times per week such as lifting, heavy construction, or active sports like cycling/jogging   [] Vigorous: intense physical activity for at least 45 minutes each session, at least 4 times per week    SOCIAL/EMOTIONAL HEALTH  How does having diabetes make you feel? (ex: okay, sad, depressed, overwhelmed, angry, etc) Did not address  Have you had any significant change in life events over the past year (ex: marriage, divorce, death in the family, new home, change in employment, etc.)? Yes - Several deaths in the family (both inlaws recently, brother  3 years ago) Patient states since brothers death she really lost control       What is your biggest challenge to managing your diabetes?   Did not address    What would you say is your personal strength that helps you with this challenge? Did not address    Who do you consider to be your support person(s)? Daughter    What barriers do you have in your life that may prevent you from overcoming this challenge? Stress with work schedule    How would you rate your level of stress? [] Low  [] Moderate  [x] High  [] Very high  How do you deal with stress? Did not address  ______________________________________________________________________________    A1c at goal: No: 11.9 June 2020  Blood Pressure at goal: Yes  Weight at goal: No  Physical activity at goal: No:  Smoking Cessation: No: N/A  Cholesterol at target: No: Very elevated TG - will look to initiate Vascepa  Annual eye exam completed: Yes  Comprehensive Foot Exam Completed: Yes  Annual urine albumin and serum creatinine: Yes    Statin: Yes    Appropriate?: Yes  Changes made: None    For reference, all diabetics should be on statin unless CI (active liver dx, ALT 3x normal, pregnant, breastfeeding, allergy to statin, age < 36 w/o ASCVD risk factors, LDL < 70    ACE/ARB: Yes  Appropriate?: Yes  Changes made: None     For reference all diabetics should be on ACE/ARB unless normal BP and normal urine albumin to creatinine ratio (< 30 mg/g) and normal GFR    Aspirin: Yes  Appropriate?: No: Generally not recommended until age 48  Changes made: Did not make change at this time    For reference:   -Primary prevention indications: DM + Age > 48 + 1 other risk factor (family hx of ASCVD, HTN, dyslipidemia, smoking, or albuminuria).  Not if at increased risk of bleeding.  -Secondary prevention indications: DM + hx of ASCVD (if allergy use ASA)    Eating patterns:    []  My plate    []  Mediterranean diet   []  Low sodium   []  DASH diet   [x]  Portion control   []  Reduced calorie    []  Fast food / Restaurant  []  Low carbohydrate   []  Sugary beverages   []  Other     Comment: Daughter is making dietary changes including lower carb and portion control. They have been making better choices but this is not always consistent. Current Medications Affecting Diabetes  Lantus 40U BID  Glipizide 10mg BID  Metformin 1000mg BID    Medications attempted in the past:  Bydureon - was working but discontinued d/t nausea and vomiting. Patient states she does feel this was more from eating poorly while on this med. When making good choices she tolerated it fine. PLAN     Provided initial education on diabetic disease state, medications, monitoring blood glucose, diet, and exercise. Initial diabetic education materials given to patient including the following: * Blood Glucose Log  *Living Well With Diabetes              * Using the Plate Method for a Balanced Meal Plan              * Things to Know About Hypoglycemia              *Smart Snacks              *A1c and Average Glucose Chart              *Alcohol and Diabetes              *Preventative Care Resources: Eye Care, Podiatrists, Dentists, [address] [place] [address] to Explore cost of Dexcom/Jamie through employee program vs DME  Order Vascepa - commercial copay card $9  Best Diabetes Apps     Physician Follow-up:  As scheduled    Medication Management Follow-up:   Diabetes Service 1 week - to assess dietary record and BG readings.       Electronically signed by Doneen Aschoff, [address] [place] [address] on 8/13/2020 at 7:43 AM    CLINICAL PHARMACY CONSULT: MED RECONCILIATION/REVIEW Bernardo  22. Tracking Only    PHSO: Yes  Total # of Interventions Recommended: 1  - New Order #: 1 New Medication Order Reason(s): Needs Additional Medication Therapy  Total Interventions Accepted: 1  Time Spent (min): [address] PharmD

## 2020-08-14 ENCOUNTER — HOSPITAL ENCOUNTER (OUTPATIENT)
Age: 50
Setting detail: SPECIMEN
Discharge: HOME | End: 2020-08-14
Payer: COMMERCIAL

## 2020-08-14 ENCOUNTER — OFFICE VISIT (OUTPATIENT)
Dept: OBGYN CLINIC | Age: 50
End: 2020-08-14
Payer: COMMERCIAL

## 2020-08-14 VITALS
WEIGHT: 228.8 LBS | DIASTOLIC BLOOD PRESSURE: 64 MMHG | BODY MASS INDEX: 33.89 KG/M2 | SYSTOLIC BLOOD PRESSURE: 92 MMHG | HEIGHT: 69 IN

## 2020-08-14 LAB
DIRECT EXAM: ABNORMAL
Lab: ABNORMAL
SPECIMEN DESCRIPTION: ABNORMAL

## 2020-08-14 PROCEDURE — 99203 OFFICE O/P NEW LOW 30 MIN: CPT | Performed by: NURSE PRACTITIONER

## 2020-08-14 NOTE — PROGRESS NOTES
Cristobal Willis)is here with complaints of a normal and physiologic vaginal discharge for2 months with  no odor, severe  itching,  moderate burning (after itching) and some pain. No UTI sx, no pelvic pain  Feels like she has itching a couple of days after the end of her cycle. Started about 6 months ago. Has been using diflucan from her PCP, but seems like it takes many doses to help. Discussed using only baby shampoo QOD, keeping area dry and allowing for air circulation at night. No change in partners  Exposure to STIs denies  O. Vulva no lesions  Vagina pink with minimal  Discharge  Cervix non friable no lesions  Affirm minimal to lab  BP 92/64 (Site: Right Upper Arm, Position: Sitting, Cuff Size: Large Adult)   Ht 5' 9\" (1.753 m)   Wt 228 lb 12.8 oz (103.8 kg)   LMP 07/31/2020   Breastfeeding No   BMI 33.79 kg/m²   ALLERGIES:  Codeine  Tricor  General Appearance: This is a well Developed, well Nourished, well groomed female. Her BMI was reviewed. Nutritional decision making was discussed,   Skin:  There was a normal Inspection of the skin. There were no rashes or lesions. Lymphatic:  No Lymph Nodes were Palpable in the neck , axilla or groin. Neck and EENT:  The neck was supple. There were no masses   The thyroid was not enlarged and had no masses. Cardiovascular: The lungs were auscultated and found to be clear. There were no rales, rhonchi or wheezes. There was a good respiratory effort. The heart was in a regular rate and rhythm. There was no murmur appreciated. No calf tenderness, edema. Abdomen: The abdomen was soft and non-tender. There were good bowel sounds in all quadrants and there was no guarding, rebound or rigidity. The patient had a normal Orientation to: Time, Place, Person, and Situation  There is no Mood / Affect changes  The uterus was nontender. The  adnexa were palpated and noted no fullness, tenderness or masses in either region.   Musculoskeletal:  Normal Gait and station was noted. Digits were evaluated without abnormal findings. Range of motion, stability and strength were evaluated and found to be appropriate for the patients   A. Vaginitis  options. P. Affirm collected and sent to lab  Will treat results accordingly  Terazol rx sent   Coconut oil for moisturizing  May consider a short term estrogen PV or clobetasol  She was also counseled on her preventative health maintenance recommendations and follow-up.   RTC PRN

## 2020-08-17 ENCOUNTER — CLINICAL DOCUMENTATION (OUTPATIENT)
Dept: PHARMACY | Facility: CLINIC | Age: 50
End: 2020-08-17

## 2020-08-17 ENCOUNTER — TELEPHONE (OUTPATIENT)
Dept: FAMILY MEDICINE CLINIC | Age: 50
End: 2020-08-17

## 2020-08-17 RX ORDER — LEVOTHYROXINE SODIUM 88 UG/1
TABLET ORAL
Qty: 30 TABLET | Refills: 1 | Status: SHIPPED | OUTPATIENT
Start: 2020-08-17 | End: 2020-10-15

## 2020-08-17 RX ORDER — PEN NEEDLE, DIABETIC 31 GX5/16"
1 NEEDLE, DISPOSABLE MISCELLANEOUS
Qty: 500 EACH | Refills: 11 | Status: SHIPPED | OUTPATIENT
Start: 2020-08-17 | End: 2020-09-25 | Stop reason: SDUPTHER

## 2020-08-17 RX ORDER — LEVOTHYROXINE SODIUM 88 UG/1
TABLET ORAL
Qty: 30 TABLET | Refills: 1 | Status: SHIPPED | OUTPATIENT
Start: 2020-08-17 | End: 2020-09-25

## 2020-08-17 RX ORDER — FLUCONAZOLE 150 MG/1
150 TABLET ORAL ONCE
Qty: 1 TABLET | Refills: 2 | Status: SHIPPED | OUTPATIENT
Start: 2020-08-17 | End: 2020-08-17

## 2020-08-17 RX ORDER — OMEPRAZOLE 20 MG/1
CAPSULE, DELAYED RELEASE ORAL
Qty: 30 CAPSULE | Refills: 1 | Status: SHIPPED | OUTPATIENT
Start: 2020-08-17 | End: 2020-10-15

## 2020-08-17 RX ORDER — GLIPIZIDE 10 MG/1
TABLET ORAL
Qty: 60 TABLET | Refills: 1 | Status: SHIPPED | OUTPATIENT
Start: 2020-08-17 | End: 2020-09-15

## 2020-08-17 RX ORDER — LISINOPRIL AND HYDROCHLOROTHIAZIDE 12.5; 1 MG/1; MG/1
TABLET ORAL
Qty: 30 TABLET | Refills: 1 | Status: SHIPPED | OUTPATIENT
Start: 2020-08-17 | End: 2020-09-25

## 2020-08-17 RX ORDER — LISINOPRIL AND HYDROCHLOROTHIAZIDE 12.5; 1 MG/1; MG/1
1 TABLET ORAL DAILY
Qty: 30 TABLET | Refills: 1 | Status: SHIPPED | OUTPATIENT
Start: 2020-08-17 | End: 2020-10-15

## 2020-08-17 RX ORDER — SPIRONOLACTONE 25 MG/1
TABLET ORAL
Qty: 30 TABLET | Refills: 1 | Status: SHIPPED | OUTPATIENT
Start: 2020-08-17 | End: 2020-10-15

## 2020-08-17 RX ORDER — GEMFIBROZIL 600 MG/1
TABLET, FILM COATED ORAL
Qty: 60 TABLET | Refills: 1 | Status: SHIPPED | OUTPATIENT
Start: 2020-08-17 | End: 2020-10-15

## 2020-08-17 NOTE — PROGRESS NOTES
Received completed [facility] Enrollment Form but only page 1 of the DM Program Application. Pharmacy Pop Care Documentation:   Patient is missing the following requirements: Complete DM Program Application. If completed by 9/01/20 patient will be eligible for enrollment in the DM Program on 10/01/20. E-mail with complete 4 page DM Program Application attached sent to patient.     Royer Warren, [address] [facility] [facility]   Department, toll free: [phone], option 7

## 2020-08-20 ENCOUNTER — HOSPITAL ENCOUNTER (OUTPATIENT)
Dept: PHARMACY | Age: 50
Setting detail: THERAPIES SERIES
Discharge: HOME | End: 2020-08-20
Payer: COMMERCIAL

## 2020-08-20 VITALS
SYSTOLIC BLOOD PRESSURE: 103 MMHG | DIASTOLIC BLOOD PRESSURE: 72 MMHG | WEIGHT: 230.9 LBS | HEART RATE: 75 BPM | OXYGEN SATURATION: 97 % | TEMPERATURE: 97.8 F | BODY MASS INDEX: 34.1 KG/M2

## 2020-08-20 PROCEDURE — 99211 OFF/OP EST MAY X REQ PHY/QHP: CPT

## 2020-08-20 NOTE — PROGRESS NOTES
Diabetic Medication Management Program  [address] [place] [address]. [place], [address] [place] [address]  Phone: [phone]  Fax: [phone]    NAME: Manish Hooker RECORD NUMBER:  [id number]  AGE: 53 y.o. GENDER: female  : 1970  EPISODE DATE:  2020     Ms. Adolfo Renee was referred to [facility] Medication Management Services by Dr. Prema Baires. Patient acknowledges working in consult agreement with clinical pharmacist and this provider. Goals per referral:   Fasting blood glucose: < 130  Peak postprandial glucose: < 180  A1C: < 7    SUBJECTIVE     Ms. Adolfo Renee is a 53 y.o. female here for the Diabetes Service for self-management education, medication review including over the counter medications and herbal products, overall wellbeing assessment, transition of care and any needed adjustments with updates and recommendations communicated to the referring physician. Patient Findings:   Current diabetic medications: Lantus/Humalog/Glipizide/Metformin  Missed doses no  Medication changes  no  Diet changes  Yes - been trying to eat better - half bun etc  Activity changes  No   Emergency Room Visit or Hospitalization no   Reason for visit: no  Acute Illness/new problems  no  Symptoms of hypoglycemia no - none  Symptoms of hyperglycemia no - none  Medication adverse reactions none    Comments: Patient states she has been trying to eat better but when looking back on her logs she has identified many areas she could make changes. OBJECTIVE     PMHx:  No past medical history on file.     Social History:    Social History     Tobacco Use    Smoking status: Never Smoker    Smokeless tobacco: Never Used   Substance Use Topics    Alcohol use: Yes       Pertinent Labs:    Lab Results   Component Value Date    LABA1C 11.9 (H) 2020    LABA1C 11.1 2019     Lab Results   Component Value Date    HDL 21 (L) 2020     Lab Results   Component Value Date    CREATININE 0.49 (L) 06/26/2020    BUN 13 06/26/2020     (L) 06/26/2020    K 4.0 06/26/2020    CL 94 (L) 06/26/2020    CO2 19 (L) 06/26/2020     Lab Results   Component Value Date    ALT 24 06/26/2020     Weight:  Wt Readings from Last 3 Encounters:   08/20/20 230 lb 14.4 oz (104.7 kg)   08/14/20 228 lb 12.8 oz (103.8 kg)   08/13/20 229 lb 14.4 oz (104.3 kg)     Blood Pressure:  BP Readings from Last 3 Encounters:   08/20/20 103/72   08/14/20 92/64   08/13/20 114/76     Current medications:    Current Outpatient Medications:     Omeprazole 20 MG TBDD, Take 20 mg by mouth daily, Disp: 30 tablet, Rfl: 1    levothyroxine (SYNTHROID) 88 MCG tablet, TAKE 1 TABLET BY MOUTH  DAILY, Disp: 30 tablet, Rfl: 1    lisinopril-hydroCHLOROthiazide (PRINZIDE;ZESTORETIC) 10-12.5 MG per tablet, Take 1 tablet by mouth daily, Disp: 30 tablet, Rfl: 1    metFORMIN (GLUCOPHAGE) 1000 MG tablet, Take 1 tablet by mouth 2 times daily (with meals), Disp: 60 tablet, Rfl: 1    gemfibrozil (LOPID) 600 MG tablet, TAKE 1 TABLET BY MOUTH 2 TIMES A DAY, Disp: 60 tablet, Rfl: 1    levothyroxine (SYNTHROID) 88 MCG tablet, TAKE 1 TABLET BY MOUTH ONE TIME A DAY, Disp: 30 tablet, Rfl: 1    spironolactone (ALDACTONE) 25 MG tablet, TAKE 1 TABLET BY MOUTH ONE TIME A DAY, Disp: 30 tablet, Rfl: 1    lisinopril-hydroCHLOROthiazide (PRINZIDE;ZESTORETIC) 10-12.5 MG per tablet, TAKE 1 TABLET BY MOUTH ONE TIME A DAY, Disp: 30 tablet, Rfl: 1    metFORMIN (GLUCOPHAGE) 1000 MG tablet, TAKE 1 TABLET BY MOUTH 2 TIMES A DAY WITH MEALS, Disp: 60 tablet, Rfl: 1    glipiZIDE (GLUCOTROL) 10 MG tablet, TAKE 1 TABLET BY MOUTH 2 TIMES A DAY BEFORE MEALS, Disp: 60 tablet, Rfl: 1    omeprazole (PRILOSEC) 20 MG delayed release capsule, TAKE 1 CAPSULE BY MOUTH ONE TIME A DAY, Disp: 30 capsule, Rfl: 1    Insulin Pen Needle (B-D ULTRAFINE III SHORT PEN) 31G X 8 MM MISC, Inject 1 each into the skin 5 times daily May substitute with any brand, Disp: 500 each, Rfl: 11    terconazole (TERAZOL 7) 0.4 % vaginal cream, One applicator intravaginally at bedtime for 7 days. , Disp: 1 Tube, Rfl: 0    Icosapent Ethyl (VASCEPA) 1 g CAPS capsule, Take 2 capsules by mouth 2 times daily, Disp: 360 capsule, Rfl: 2    insulin glargine (LANTUS SOLOSTAR) 100 UNIT/ML injection pen, Inject 40 Units into the skin 2 times daily (Patient taking differently: Inject 45 Units into the skin 2 times daily ), Disp: 15 pen, Rfl: 5    atorvastatin (LIPITOR) 40 MG tablet, Take 1 tablet by mouth daily, Disp: 90 tablet, Rfl: 5    glucose monitoring kit (FREESTYLE) monitoring kit, 1 kit by Does not apply route 3 times daily, Disp: 1 kit, Rfl: 0    blood glucose test strips (ASCENSIA AUTODISC VI;ONE TOUCH ULTRA TEST VI) strip, Use with associated glucose meter. , Disp: 600 strip, Rfl: 11    Lancets MISC, 1 each by Does not apply route 3 times daily, Disp: 600 each, Rfl: 1    insulin lispro, 1 Unit Dial, 100 UNIT/ML SOPN, Inject 10 Units into the skin 3 times daily (with meals), Disp: 5 pen, Rfl: 2    Immunizations:   Most Recent Immunizations   Administered Date(s) Administered    DT (pediatric) 01/01/1998    Hepatitis B 02/08/1993    Influenza A ([id number]) Vaccine PF IM 10/28/2009    Influenza Virus Vaccine 10/08/2016    Influenza, MDCK Quadv, IM, PF (Flucelvax 4 yrs and older) 01/08/2020    Influenza, Quadv, IM, PF (6 mo and older Fluzone, Flulaval, Fluarix, and 3 yrs and older Afluria) 02/01/2019    Pneumococcal Polysaccharide ([id number]) 11/20/2015    Tdap (Boostrix, Adacel) 02/26/2020       Home Blood Glucose Results:         Blood glucose trends noted: PM BG is extremely high many times over 400. Morning BG more consistently in 200s    ASSESSMENT     Recent A1c: Next due     Lab Results   Component Value Date    LABA1C 11.9 (H) 06/26/2020    LABA1C 11.1 03/30/2019        Eating patterns: Patient has been eating higher carbs than she realized. She states logging her diet was eye opening  See above. Blood Glucose Testing: Patient testing every morning as requested and additionally in the evening. Diabetic Regimen/Compliance: Compliant with medication but PM Humalog is being taken prior to bed instead of dinner     Other Medication Compliance: Compliant with medication      PLAN           Physician Follow-up:  As scheduled    Medication Management Follow-up:   Diabetes Service  1 week - will review BG since increasing Lantus - consider GLP1 in place of Glipizide.     Electronically signed by Tesha Frederick, [address] [place] [address] on 8/20/2020 at 8:08 AM

## 2020-08-26 ENCOUNTER — HOSPITAL ENCOUNTER (OUTPATIENT)
Dept: PHARMACY | Age: 50
Setting detail: THERAPIES SERIES
Discharge: HOME | End: 2020-08-26
Payer: COMMERCIAL

## 2020-08-26 VITALS — BODY MASS INDEX: 33.98 KG/M2 | TEMPERATURE: 97.2 F | WEIGHT: 230.1 LBS

## 2020-08-26 PROCEDURE — 95250 CONT GLUC MNTR PHYS/QHP EQP: CPT

## 2020-08-26 PROCEDURE — 99212 OFFICE O/P EST SF 10 MIN: CPT

## 2020-08-26 RX ORDER — ORAL SEMAGLUTIDE 3 MG/1
TABLET ORAL
Qty: 30 TABLET | Refills: 0 | Status: SHIPPED | OUTPATIENT
Start: 2020-08-26 | End: 2020-09-25 | Stop reason: ALTCHOICE

## 2020-08-26 NOTE — PROGRESS NOTES
Diabetic Medication Management Program  [facility] [place] - INPATIENT  [address] [facility]. [place], [address]  Phone: [phone]  Fax: [phone]    NAME: Israel Barroso RECORD NUMBER:  [id number]  AGE: 53 y.o. GENDER: female  : 1970  EPISODE DATE:  2020     Ms. Chapo Rojas was referred to [facility] Medication Management Services by Dr. Brandon Green. Patient acknowledges working in consult agreement with clinical pharmacist and this provider. Goals per referral:   Fasting blood glucose: < 130  Peak postprandial glucose: < 180  A1C: < 7    SUBJECTIVE     Ms. Chapo Rojas is a 53 y.o. female here for the Diabetes Service for self-management education, medication review including over the counter medications and herbal products, overall wellbeing assessment, transition of care and any needed adjustments with updates and recommendations communicated to the referring physician. Patient Findings:     Current diabetic medications: 45 U BID and Humalog 10U before breakfast and dinner (does not usually take to work for lunch injection)  Missed doses no  Medication changes  no  Diet changes  Yes - continuing to watch carb intake and logging all food on paper through the day then transferring to journal (log below)  Activity changes  no  Emergency Room Visit or Hospitalization no   Reason for visit: no  Acute Illness/new problems  no  Symptoms of hypoglycemia no - none  Symptoms of hyperglycemia yes - fatigue  Medication adverse reactions none    OBJECTIVE     PMHx:  No past medical history on file.     Social History:    Social History     Tobacco Use    Smoking status: Never Smoker    Smokeless tobacco: Never Used   Substance Use Topics    Alcohol use: Yes     Pertinent Labs:    Lab Results   Component Value Date    LABA1C 11.9 (H) 2020    LABA1C 11.1 2019     Lab Results   Component Value Date    HDL 21 (L) 2020     Lab Results   Component Value Date    CREATININE 0.49 (L) 06/26/2020    BUN 13 06/26/2020     (L) 06/26/2020    K 4.0 06/26/2020    CL 94 (L) 06/26/2020    CO2 19 (L) 06/26/2020     Lab Results   Component Value Date    ALT 24 06/26/2020     Weight:  Wt Readings from Last 3 Encounters:   08/26/20 230 lb 1.6 oz (104.4 kg)   08/20/20 230 lb 14.4 oz (104.7 kg)   08/14/20 228 lb 12.8 oz (103.8 kg)     Blood Pressure:  BP Readings from Last 3 Encounters:   08/20/20 103/72   08/14/20 92/64   08/13/20 114/76     Current medications:    Current Outpatient Medications:     Omeprazole 20 MG TBDD, Take 20 mg by mouth daily, Disp: 30 tablet, Rfl: 1    levothyroxine (SYNTHROID) 88 MCG tablet, TAKE 1 TABLET BY MOUTH  DAILY, Disp: 30 tablet, Rfl: 1    lisinopril-hydroCHLOROthiazide (PRINZIDE;ZESTORETIC) 10-12.5 MG per tablet, Take 1 tablet by mouth daily, Disp: 30 tablet, Rfl: 1    metFORMIN (GLUCOPHAGE) 1000 MG tablet, Take 1 tablet by mouth 2 times daily (with meals), Disp: 60 tablet, Rfl: 1    gemfibrozil (LOPID) 600 MG tablet, TAKE 1 TABLET BY MOUTH 2 TIMES A DAY, Disp: 60 tablet, Rfl: 1    levothyroxine (SYNTHROID) 88 MCG tablet, TAKE 1 TABLET BY MOUTH ONE TIME A DAY, Disp: 30 tablet, Rfl: 1    spironolactone (ALDACTONE) 25 MG tablet, TAKE 1 TABLET BY MOUTH ONE TIME A DAY, Disp: 30 tablet, Rfl: 1    lisinopril-hydroCHLOROthiazide (PRINZIDE;ZESTORETIC) 10-12.5 MG per tablet, TAKE 1 TABLET BY MOUTH ONE TIME A DAY, Disp: 30 tablet, Rfl: 1    metFORMIN (GLUCOPHAGE) 1000 MG tablet, TAKE 1 TABLET BY MOUTH 2 TIMES A DAY WITH MEALS, Disp: 60 tablet, Rfl: 1    glipiZIDE (GLUCOTROL) 10 MG tablet, TAKE 1 TABLET BY MOUTH 2 TIMES A DAY BEFORE MEALS, Disp: 60 tablet, Rfl: 1    omeprazole (PRILOSEC) 20 MG delayed release capsule, TAKE 1 CAPSULE BY MOUTH ONE TIME A DAY, Disp: 30 capsule, Rfl: 1    Insulin Pen Needle (B-D ULTRAFINE III SHORT PEN) 31G X 8 MM MISC, Inject 1 each into the skin 5 times daily May substitute with any brand, Disp: 500 each, Rfl: 11    Icosapent Ethyl (VASCEPA) 1 g CAPS capsule, Take 2 capsules by mouth 2 times daily, Disp: 360 capsule, Rfl: 2    insulin glargine (LANTUS SOLOSTAR) 100 UNIT/ML injection pen, Inject 40 Units into the skin 2 times daily (Patient taking differently: Inject 45 Units into the skin 2 times daily ), Disp: 15 pen, Rfl: 5    atorvastatin (LIPITOR) 40 MG tablet, Take 1 tablet by mouth daily, Disp: 90 tablet, Rfl: 5    glucose monitoring kit (FREESTYLE) monitoring kit, 1 kit by Does not apply route 3 times daily, Disp: 1 kit, Rfl: 0    blood glucose test strips (ASCENSIA AUTODISC VI;ONE TOUCH ULTRA TEST VI) strip, Use with associated glucose meter. , Disp: 600 strip, Rfl: 11    Lancets MISC, 1 each by Does not apply route 3 times daily, Disp: 600 each, Rfl: 1    insulin lispro, 1 Unit Dial, 100 UNIT/ML SOPN, Inject 10 Units into the skin 3 times daily (with meals), Disp: 5 pen, Rfl: 2    Immunizations:   Most Recent Immunizations   Administered Date(s) Administered    DT (pediatric) 01/01/1998    Hepatitis B 02/08/1993    Influenza A ([id number]) Vaccine PF IM 10/28/2009    Influenza Virus Vaccine 10/08/2016    Influenza, MDCK Quadv, IM, PF (Flucelvax 4 yrs and older) 01/08/2020    Influenza, Quadv, IM, PF (6 mo and older Fluzone, Flulaval, Fluarix, and 3 yrs and older Afluria) 02/01/2019    Pneumococcal Polysaccharide ([id number]) 11/20/2015    Tdap (Boostrix, Adacel) 02/26/2020       Home Blood Glucose Results:       Blood glucose trends noted: Still above goal but improving. Morning BG under 300 consistently which patient is pleased with. Advised we still have a ways to go before we are at goal so we will increase insulin and try new medication to assist with BG target as well as weight loss and positive CV profile. ASSESSMENT     Recent A1c: Next due 9/26/20    Lab Results   Component Value Date    LABA1C 11.9 (H) 06/26/2020    LABA1C 11.1 03/30/2019        Eating patterns: Continues to log food intake daily.   Watching carb intake by reducing portions and making better choices but not counting a certain target. Blood Glucose Testing: Testing BID-TID     Diabetic Regimen/Compliance: Compliant with Lantus. Does not take afternoon Humalog dose at work and has been maintaining with just morning and evening. Other Medication Compliance: Compliant with other meds except she has been taking only ONE Vascepa BID instead of TWO. She plans to correct this today. OTHER:  Applied Freestyle Jamie Pro sensor SN: [id number] Exp: 12/31/20 for CGM over the next 2 weeks      PLAN           Physician Follow-up:  As scheduled    Medication Management Follow-up:   Diabetes Service  2 weeks - access since insulin titration and start of Rybelsus and discontinuation of Glipizide.        Electronically signed by Shira Sanz [facility] on 8/26/2020 at 7:42 AM

## 2020-08-26 NOTE — PROGRESS NOTES
Patient also given paperwork to complete for Employee Diabetic Program. Once approved will look into ordering Dexcom for home use.

## 2020-08-27 NOTE — PROGRESS NOTES
Received pages 2-4 of DM Program Application. Pharmacy Pop Care Documentation:   Patient has completed all the requirements by 9/01/20 and therefore will be enrolled in the DM Program on 10/01/20. Application received: 7/39/40  Application scanned. Letter mailed to patient.     Dima Garcia, [facility]   Department, toll free: [phone], option 7

## 2020-09-10 ENCOUNTER — APPOINTMENT (OUTPATIENT)
Dept: PHARMACY | Age: 50
End: 2020-09-10
Payer: COMMERCIAL

## 2020-09-15 ENCOUNTER — OFFICE VISIT (OUTPATIENT)
Dept: OBGYN CLINIC | Age: 50
End: 2020-09-15
Payer: COMMERCIAL

## 2020-09-15 ENCOUNTER — HOSPITAL ENCOUNTER (OUTPATIENT)
Age: 50
Setting detail: SPECIMEN
Discharge: HOME | End: 2020-09-15
Payer: COMMERCIAL

## 2020-09-15 VITALS
DIASTOLIC BLOOD PRESSURE: 72 MMHG | SYSTOLIC BLOOD PRESSURE: 92 MMHG | WEIGHT: 228.2 LBS | HEIGHT: 69 IN | BODY MASS INDEX: 33.8 KG/M2

## 2020-09-15 PROCEDURE — 99396 PREV VISIT EST AGE 40-64: CPT | Performed by: NURSE PRACTITIONER

## 2020-09-15 ASSESSMENT — ENCOUNTER SYMPTOMS
SHORTNESS OF BREATH: 0
BACK PAIN: 0
ABDOMINAL DISTENTION: 0
ABDOMINAL PAIN: 0
DIARRHEA: 0
COUGH: 0
CONSTIPATION: 0

## 2020-09-15 NOTE — PROGRESS NOTES
HPI:     Aimee Monk a 53 y.o. female who presents today for:No chief complaint on file. HPI- Lei  Here for annual exam.  Works as a palliative care NP. Has 2 children 20/19. Has been managing her DM through healthier eating and walking. GYNECOLOGICHISTORY:  MenstrualHistory: No LMP recorded. Sexually Transmitted Infections: No  History of Ectopic Pregnancy:No  Menarche: 18  Cycles q 28-30 Days  Durationof cycles: 2  Dysmenorrhea: none  Sexually active: yes  Dyspareunia: none    Current Outpatient Medications   Medication Sig Dispense Refill    Semaglutide (RYBELSUS) 3 MG TABS Take 1 tablet by mouth at least 30 minutes prior to any other medication or food every morning.  30 tablet 0    Omeprazole 20 MG TBDD Take 20 mg by mouth daily 30 tablet 1    levothyroxine (SYNTHROID) 88 MCG tablet TAKE 1 TABLET BY MOUTH  DAILY 30 tablet 1    lisinopril-hydroCHLOROthiazide (PRINZIDE;ZESTORETIC) 10-12.5 MG per tablet Take 1 tablet by mouth daily 30 tablet 1    metFORMIN (GLUCOPHAGE) 1000 MG tablet Take 1 tablet by mouth 2 times daily (with meals) 60 tablet 1    gemfibrozil (LOPID) 600 MG tablet TAKE 1 TABLET BY MOUTH 2 TIMES A DAY 60 tablet 1    levothyroxine (SYNTHROID) 88 MCG tablet TAKE 1 TABLET BY MOUTH ONE TIME A DAY 30 tablet 1    spironolactone (ALDACTONE) 25 MG tablet TAKE 1 TABLET BY MOUTH ONE TIME A DAY 30 tablet 1    lisinopril-hydroCHLOROthiazide (PRINZIDE;ZESTORETIC) 10-12.5 MG per tablet TAKE 1 TABLET BY MOUTH ONE TIME A DAY 30 tablet 1    metFORMIN (GLUCOPHAGE) 1000 MG tablet TAKE 1 TABLET BY MOUTH 2 TIMES A DAY WITH MEALS 60 tablet 1    glipiZIDE (GLUCOTROL) 10 MG tablet TAKE 1 TABLET BY MOUTH 2 TIMES A DAY BEFORE MEALS 60 tablet 1    omeprazole (PRILOSEC) 20 MG delayed release capsule TAKE 1 CAPSULE BY MOUTH ONE TIME A DAY 30 capsule 1    Insulin Pen Needle (B-D ULTRAFINE III SHORT PEN) 31G X 8 MM MISC Inject 1 each into the skin 5 times daily May substitute with any brand 500 each 11 congestion and hearing loss. Eyes: Negative for visual disturbance. Respiratory: Negative for cough and shortness of breath. Cardiovascular: Negative for chest pain and palpitations. Gastrointestinal: Negative for abdominal distention, abdominal pain, constipation and diarrhea. Genitourinary: Negative for flank pain, frequency, menstrual problem, pelvic pain and vaginal discharge. Musculoskeletal: Negative for back pain. Neurological: Negative for syncope and headaches. Psychiatric/Behavioral: Negative for behavioral problems. Objective: There were no vitals taken for this visit. Physical Exam  Constitutional:       Appearance: She is well-developed. Eyes:      Pupils: Pupils are equal, round, and reactive to light. Neck:      Thyroid: No thyromegaly. Trachea: No tracheal deviation. Cardiovascular:      Rate and Rhythm: Normal rate and regular rhythm. Heart sounds: Normal heart sounds. Pulmonary:      Effort: Pulmonary effort is normal. No respiratory distress. Breath sounds: Normal breath sounds. Chest:      Breasts: Breasts are symmetrical.         Right: No inverted nipple. Left: No inverted nipple, mass, nipple discharge, skin change or tenderness. Abdominal:      General: Bowel sounds are normal. There is no distension. Palpations: Abdomen is soft. There is no mass. Tenderness: There is no abdominal tenderness. Hernia: There is no hernia in the left inguinal area. Genitourinary:     Labia:         Right: No rash or lesion. Left: No rash or lesion. Vagina: No vaginal discharge or tenderness. Cervix: No cervical motion tenderness, discharge or friability. Uterus: Not deviated, not enlarged and not fixed. Adnexa:         Right: No mass, tenderness or fullness. Left: No mass, tenderness or fullness. Musculoskeletal:         General: No tenderness.    Lymphadenopathy:      Cervical: No cervical adenopathy. Skin:     General: Skin is warm and dry. Neurological:      Mental Status: She is alert and oriented to person, place, and time. Psychiatric:         Behavior: Behavior normal.         Thought Content: Thought content normal.         Judgment: Judgment normal.           Assessment:     1. Encounter for gynecological examination    2. Encounter for screening mammogram for breast cancer          Plan:   1. Pap collected. Discussed new pap smear guidelines. Desires re-pap in 1 year. 2. Screening mammogram discussed and advised yearly if within normal limits. 3. Calcium and Vitamin D dosing reviewed. 4. Colonoscopy screening reviewed. 5. Bone density testing reviewed.      Electronicallysigned by Mauricio Alex on 9/15/2020

## 2020-09-23 ENCOUNTER — TELEPHONE (OUTPATIENT)
Dept: PHARMACY | Facility: CLINIC | Age: 50
End: 2020-09-23

## 2020-09-24 ENCOUNTER — HOSPITAL ENCOUNTER (OUTPATIENT)
Dept: PHARMACY | Age: 50
Setting detail: THERAPIES SERIES
Discharge: HOME | End: 2020-09-24
Payer: COMMERCIAL

## 2020-09-24 VITALS
TEMPERATURE: 97.3 F | DIASTOLIC BLOOD PRESSURE: 72 MMHG | SYSTOLIC BLOOD PRESSURE: 106 MMHG | OXYGEN SATURATION: 97 % | WEIGHT: 227.8 LBS | HEART RATE: 69 BPM | BODY MASS INDEX: 33.64 KG/M2

## 2020-09-24 LAB — HBA1C MFR BLD: 8.9 %

## 2020-09-24 PROCEDURE — 99212 OFFICE O/P EST SF 10 MIN: CPT

## 2020-09-24 PROCEDURE — 83036 HEMOGLOBIN GLYCOSYLATED A1C: CPT

## 2020-09-24 NOTE — PROGRESS NOTES
LABA1C 11.1 03/30/2019     Lab Results   Component Value Date    HDL 21 (L) 06/26/2020     Lab Results   Component Value Date    CREATININE 0.49 (L) 06/26/2020    BUN 13 06/26/2020     (L) 06/26/2020    K 4.0 06/26/2020    CL 94 (L) 06/26/2020    CO2 19 (L) 06/26/2020     Lab Results   Component Value Date    ALT 24 06/26/2020     Weight:  Wt Readings from Last 3 Encounters:   09/24/20 227 lb 12.8 oz (103.3 kg)   09/15/20 228 lb 3.2 oz (103.5 kg)   08/26/20 230 lb 1.6 oz (104.4 kg)     Blood Pressure:  BP Readings from Last 3 Encounters:   09/24/20 106/72   09/15/20 92/72   08/20/20 103/72     Current medications:    Current Outpatient Medications:     Semaglutide (RYBELSUS) 3 MG TABS, Take 1 tablet by mouth at least 30 minutes prior to any other medication or food every morning., Disp: 30 tablet, Rfl: 0    Omeprazole 20 MG TBDD, Take 20 mg by mouth daily, Disp: 30 tablet, Rfl: 1    levothyroxine (SYNTHROID) 88 MCG tablet, TAKE 1 TABLET BY MOUTH  DAILY, Disp: 30 tablet, Rfl: 1    lisinopril-hydroCHLOROthiazide (PRINZIDE;ZESTORETIC) 10-12.5 MG per tablet, Take 1 tablet by mouth daily, Disp: 30 tablet, Rfl: 1    metFORMIN (GLUCOPHAGE) 1000 MG tablet, Take 1 tablet by mouth 2 times daily (with meals), Disp: 60 tablet, Rfl: 1    gemfibrozil (LOPID) 600 MG tablet, TAKE 1 TABLET BY MOUTH 2 TIMES A DAY, Disp: 60 tablet, Rfl: 1    levothyroxine (SYNTHROID) 88 MCG tablet, TAKE 1 TABLET BY MOUTH ONE TIME A DAY, Disp: 30 tablet, Rfl: 1    spironolactone (ALDACTONE) 25 MG tablet, TAKE 1 TABLET BY MOUTH ONE TIME A DAY, Disp: 30 tablet, Rfl: 1    lisinopril-hydroCHLOROthiazide (PRINZIDE;ZESTORETIC) 10-12.5 MG per tablet, TAKE 1 TABLET BY MOUTH ONE TIME A DAY, Disp: 30 tablet, Rfl: 1    metFORMIN (GLUCOPHAGE) 1000 MG tablet, TAKE 1 TABLET BY MOUTH 2 TIMES A DAY WITH MEALS, Disp: 60 tablet, Rfl: 1    omeprazole (PRILOSEC) 20 MG delayed release capsule, TAKE 1 CAPSULE BY MOUTH ONE TIME A DAY, Disp: 30 capsule, Blood Glucose Testing: Testing consitently 3 times daily     Diabetic Regimen/Compliance: Compliant with regimen     Other Medication Compliance: Compliant with regimen      PLAN     1. Continue to log food intake/BG/Insulin administered  2. Continue current medicationd and reduced carb diet  3. Do not take Humalog before bedtime if BG under 150  4. No need to eat bedtime snack unless concerned with low BG  5.  Begin Rybelsus 7mg after 3mg complete      Physician Follow-up:  As scheduled    Medication Management Follow-up:   Diabetes Service  3 weeks    Electronically signed by Tanisha Vail, [address] [place] [address] on 9/24/2020 at 7:44 AM    CLINICAL PHARMACY CONSULT: MED RECONCILIATION/REVIEW ADDENDUM    For Pharmacy Admin Tracking Only    PHSO: Yes  Total # of Interventions Recommended: 1  - New Order #: 1 New Medication Order Reason(s): Needs Additional Medication Therapy  - Maintenance Safety Lab Monitoring #: 1  Total Interventions Accepted: 1  Time Spent (min): [address], PharmD

## 2020-09-25 ENCOUNTER — SCHEDULED TELEPHONE ENCOUNTER (OUTPATIENT)
Dept: PHARMACY | Facility: CLINIC | Age: 50
End: 2020-09-25

## 2020-09-25 LAB — CYTOLOGY REPORT: NORMAL

## 2020-09-25 RX ORDER — ASPIRIN 81 MG/1
81 TABLET ORAL DAILY
COMMUNITY
End: 2020-09-25 | Stop reason: SDUPTHER

## 2020-09-25 RX ORDER — CHOLECALCIFEROL (VITAMIN D3) 125 MCG
500 CAPSULE ORAL DAILY
COMMUNITY

## 2020-09-25 RX ORDER — M-VIT,TX,IRON,MINS/CALC/FOLIC 27MG-0.4MG
1 TABLET ORAL DAILY
COMMUNITY

## 2020-09-25 RX ORDER — ORAL SEMAGLUTIDE 7 MG/1
TABLET ORAL
Qty: 30 TABLET | Refills: 0 | Status: SHIPPED | OUTPATIENT
Start: 2020-09-25 | End: 2020-10-29 | Stop reason: DRUGHIGH

## 2020-09-25 NOTE — TELEPHONE ENCOUNTER
[facility] Employee Diabetes Program  =================================================================  James Bass is a 53 y.o. female enrolled in the [facility] Employee Diabetes Program. New enrollee 10/1/2020    Questions about CGM coverage. 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      Medications:  Medication Sig    Semaglutide (RYBELSUS) 7 MG TABS Take 1 tablet by mouth every morning 30 minutes prior to consuming any food or other medication.  - just finishing up 3 mg tabs, then will be changing to 7 mg    levothyroxine (SYNTHROID) 88 MCG tablet TAKE 1 TABLET BY MOUTH  DAILY  - last TSH check good    lisinopril-hydroCHLOROthiazide (PRINZIDE;ZESTORETIC) 10-12.5 MG per tablet Take 1 tablet by mouth daily    metFORMIN (GLUCOPHAGE) 1000 MG tablet Take 1 tablet by mouth 2 times daily (with meals)    gemfibrozil (LOPID) 600 MG tablet TAKE 1 TABLET BY MOUTH 2 TIMES A DAY  - confirms has tried fenofibrate, makes labs look like have hepatitis, has tried multiple formulations    spironolactone (ALDACTONE) 25 MG tablet TAKE 1 TABLET BY MOUTH ONE TIME A DAY  - states was started for protection in DM?  - has not had issues with high BP, did have some swelling in ankles when started    omeprazole (PRILOSEC) 20 MG delayed release capsule TAKE 1 CAPSULE BY MOUTH ONE TIME A DAY    Insulin Pen Needle (B-D ULTRAFINE III SHORT PEN) 31G X 8 MM MISC Inject 1 each into the skin 5 times daily May substitute with any brand  - got 29G x 12mm and getting bruises, also tried 32G x 4mm and too small    Icosapent Ethyl (VASCEPA) 1 g CAPS capsule Take 2 capsules by mouth 2 times daily  - confirms 2 cap BID    insulin glargine (LANTUS SOLOSTAR) 100 UNIT/ML injection pen Inject 40 Units into the skin 2 times daily (Patient taking differently: Inject 45 Units into the skin 2 times daily )  - 50 units BID    atorvastatin (LIPITOR) 40 MG tablet Take 1 tablet by mouth daily    glucose monitoring kit (FREESTYLE) monitoring kit 1 kit by Does not apply route 3 times daily  - Prodigy - back of meter broke and would like another one if possible    blood glucose test strips (ASCENSIA AUTODISC VI;ONE TOUCH ULTRA TEST VI) strip Use with associated glucose meter. - testing up to 5 times daily, current Rx for 3 times per day    Lancets MISC 1 each by Does not apply route 3 times daily  - see above    insulin lispro, 1 Unit Dial, 100 UNIT/ML SOPN Inject 10 Units into the skin 3 times daily (with meals)  - confirms dose  - also takes at bedtime if over 150 will take 10 units      MVI daily  Probiotic once daily - was having yeast infections  Vitamin D 1000 IU daily  Vitamin B12 500 mcg daily  ASA 81 mg daily - states for clot prevention b/c of legs    [facility]: [facility] [address]  Current testing supplies/frequency: Prodigy - up to 3 times per day; says works well for her but would like increase   Pen needles/syringes: on free pen needles last dispensed 29G x 1/2\" for qty 500; would like smaller pen needle but not the smallest    Allergies: Allergies   Allergen Reactions    Codeine     Tricor [Fenofibrate]       Vitals/Labs:  BP Readings from Last 3 Encounters:   09/24/20 106/72   09/15/20 92/72   08/20/20 103/72     Component      Latest Ref Rng & Units 6/26/2020 3/30/2019           8:10 AM 12:00 AM   Microalbumin Creatinine Ratio        21.1   Microalb, Ur      <21 mg/L 68 (H) 2.5   Creatinine, Urine        118.49   Creatinine, Ur      28.0 - 217.0 mg/dL 70.9    Microalb/Crt.  Ratio      <25 mcg/mg creat 96 (H)      Component      Latest Ref Rng & Units 9/24/2020 6/26/2020 3/30/2019          12:00 AM  8:06 AM 12:00 AM   Hemoglobin A1C      % 8.9 11.9 (H) 11.1   eAG (mg/dL)      mg/dL  295      Component      Latest Ref Rng & Units 6/26/2020           8:06 AM   Cholesterol, Fasting      <200 mg/dL 238 (H)   HDL Cholesterol      >40 mg/dL 21 (L)   LDL Cholesterol      0 - 130 mg/dL Chol/HDL Ratio      <5 11.3 (H)   Triglyceride, Fasting      <150 mg/dL 2,064 (H)   VLDL      1 - 30 mg/dL NOT REPORTED     Component      Latest Ref Rng & Units 6/26/2020           8:06 AM   LDL Direct      <100 mg/dL 52     ALT   Date Value Ref Range Status   06/26/2020 24 5 - 33 U/L Final     AST   Date Value Ref Range Status   06/26/2020 20 <32 U/L Final     The 10-year ASCVD risk score (Lana Cardoso, et al., 2013) is: 6.7%    Values used to calculate the score:      Age: 53 years      Sex: Female      Is Non- : No      Diabetic: Yes      Tobacco smoker: No      Systolic Blood Pressure: 530 mmHg      Is BP treated: No      HDL Cholesterol: 21 mg/dL      Total Cholesterol: 238 mg/dL     Lab Results   Component Value Date    CREATININE 0.49 (L) 06/26/2020     CrCl cannot be calculated (Unknown ideal weight.). Component      Latest Ref Rng & Units 6/26/2020           8:06 AM   GFR Non-African American      >60 mL/min >60     Lab Results   Component Value Date    K 4.0 06/26/2020      No results found for: TSHFT4, TSH     Immunizations:  Immunization History   Administered Date(s) Administered    DT (pediatric) 01/01/1998    Hepatitis B 02/08/1993    Influenza A (L6X2-04) Vaccine PF IM 10/28/2009    Influenza Virus Vaccine 12/10/2007, 01/15/2010, 10/27/2015, 10/08/2016    Influenza, MDCK Quadv, IM, PF (Flucelvax 4 yrs and older) 01/08/2020    Influenza, Quadv, IM, PF (6 mo and older Fluzone, Flulaval, Fluarix, and 3 yrs and older Afluria) 11/20/2017, 02/01/2019    Pneumococcal Polysaccharide ([id number]) 11/20/2015    Tdap (Boostrix, Adacel) 01/15/2009, 02/26/2020      Social History:  Social History     Tobacco Use    Smoking status: Never Smoker    Smokeless tobacco: Never Used   Substance Use Topics    Alcohol use:  Yes     ASSESSMENT:   Ongoing Program Requirements (Y indicates has completed for the year, N indicates needs to be completed by 12/31/2020):  Yes - OV with provider for DM (2nd)  Yes - ACC/diabetes educator visit (if A1c over 8%) - has been see PharmD DM clinic at [facility]  Yes - A1c (2nd)  Yes - Lipid panel  Yes - Urine microalbumin  Yes - Pneumococcal vaccination: UTD  No - Influenza vaccination for Fall 2020  Yes - Medication adherence over 70% - n/a on insulin  Yes - On statin or contraindication(s) atorvastatin  Yes - On ACEi/ARB or contraindication(s) lisinopril-HCTZ     Formulary Medication Review:  Non-formulary or medications with cost-effective alternatives:   · Would like to try smaller pen needles  · Using Prodigy but needs new supplies, up to 5 times daily    Current medications eligible for copay waiver, up to $300, through [address]:  - aspirin (with prescription), atorvastatin, Lantus, Humalog, lisinopril-HCTZ, metformin  - Rybelsus not covered but coupon available to reduce out of pocket cost  - Generic [facility] pharmacy-stocked) insulin syringes and pen needles  - Agamatrix or Prodigy meter and supplies  - Dexcom G6 supplies (see details below)     Diabetes Care:   - Glycemic Goal: <7.0%. Is not at blood glucose goal but working with PharmD DM clinic. Current regimen metformin 1000 mg BID, Rybelsus 7 mg daily, Lantus 45 units BID, Humalog 10 units TID AC. Stopped glipizide and started Rybelsus @8/26/2020. Other Considerations:  - Blood Pressure Goal: BP less than 140/90 mmHg due to history of DM: Is at blood pressure goal. Current regimen lisinopril-HCTZ 10-12.5 mg daily. eGFR WNL, UACR elevated but previously WNL; continue to monitor.  - Lipids: LDL under 70 mg/dL on statin regimen; noted TGs over 2000, appears atorvastatin was increased from 20 mg daily to 40 mg daily; is also on gemfibrozil 600 mg BID and Vascepa 2 g (1 g x 2) BID.  Noted inx with atorvastatin and gemfibrozil (gemfibrozil may enhance the myopathic (rhabdomyolysis) effect of atorvastatin) - allergy to fenofibrate per allergy list and therefore gemfibrozil may be considered but need to continue to monitor risk vs benefit  - Smoking status: never smoked    PLAN:  - Consideration(s) for provider: see refill encounter to Kang Rock, DO  · Increase Prodigy Rx up to 5 times daily  · Change to smaller pen needle - would like to try 31G x 8mm; 5 times daily  · Would like ASA for free - note to pharmacy do not fill until after 10/1/20  - DM program gaps identified:   · Ongoing requirements: Influenza vaccination for [id number]   - Education to patient: as above and:   · Explained in detail Freestyle Jamie and Dexcom G6 CGM coverage. Freestyle Jamie NOT eligible for waived copay through DM program but is covered through insurance (cost $50 one time fill of reader if needed (some smart phones compatible); $50 per month of sensors or $130 for 3 months). Dexcom G6 can be covered via medical or pharmacy benefit. If covered through pharmacy and filled at [facility] is eligible to be covered under DM program however is expected that would use up program allowance in which case patient costs would be copays of all eligible prescriptions. Estimated copays of Dexcom (is percentage of drug cost so not exact and can change) around $80 for transmitter (90 day supply) and $76 per month/$225 for 3 months of sensors. If covered under medical benefit would need to be filled via medical supplier/DME with approval from insurance and cost subject to coinsurance/deductible, etc (would refer questions to MMO and/or [facility]). Pt expressed appreciation for information and will outreach to us if she has further questions or if she would like any assistance getting Rxs to pharmacy.   - Follow up: PCP for identified gaps or as scheduled below, Pharmacist for identified gaps or as scheduled below and Diabetes Educator or Ambulatory Care Coordinator for identified gaps or as scheduled below  - Upcoming appointments:   Future Appointments   Date Time Provider Beck Campos   9/25/2020  7:30 AM SCHEDULE, S CLINICAL PHARMACY [facility] Rx None   9/29/2020  8:15 AM DO Kurt Zhao T[facility]   10/13/2020  7:30 AM [facility] MGMT [facility] MGMT [place] Sox   9/17/2021  7:30 AM Carli Ortiz, APRN - CNP [facility], PharmD, [address] [facility] Pharmacist  Dept: [phone] (toll free [phone], option 7)

## 2020-09-27 RX ORDER — BLOOD-GLUCOSE CONTROL, LOW
EACH MISCELLANEOUS
Qty: 500 EACH | Refills: 3 | Status: SHIPPED | OUTPATIENT
Start: 2020-09-27

## 2020-09-27 RX ORDER — PEN NEEDLE, DIABETIC 31 GX5/16"
NEEDLE, DISPOSABLE MISCELLANEOUS
Qty: 500 EACH | Refills: 3 | Status: SHIPPED | OUTPATIENT
Start: 2020-09-27 | End: 2022-01-06

## 2020-09-27 RX ORDER — BLOOD-GLUCOSE METER
EACH MISCELLANEOUS
Qty: 1 KIT | Refills: 0 | Status: SHIPPED | OUTPATIENT
Start: 2020-09-27 | End: 2021-07-13 | Stop reason: ALTCHOICE

## 2020-09-27 RX ORDER — ASPIRIN 81 MG/1
81 TABLET ORAL DAILY
Qty: 90 TABLET | Refills: 3 | Status: SHIPPED | OUTPATIENT
Start: 2020-09-27 | End: 2020-12-28 | Stop reason: SDUPTHER

## 2020-09-27 RX ORDER — BLOOD SUGAR DIAGNOSTIC
STRIP MISCELLANEOUS
Qty: 500 EACH | Refills: 3 | Status: SHIPPED | OUTPATIENT
Start: 2020-09-27 | End: 2021-07-13 | Stop reason: ALTCHOICE

## 2020-09-29 ENCOUNTER — OFFICE VISIT (OUTPATIENT)
Dept: FAMILY MEDICINE CLINIC | Age: 50
End: 2020-09-29
Payer: COMMERCIAL

## 2020-09-29 VITALS
WEIGHT: 225 LBS | RESPIRATION RATE: 18 BRPM | SYSTOLIC BLOOD PRESSURE: 110 MMHG | HEIGHT: 69 IN | HEART RATE: 81 BPM | OXYGEN SATURATION: 98 % | DIASTOLIC BLOOD PRESSURE: 72 MMHG | BODY MASS INDEX: 33.33 KG/M2

## 2020-09-29 PROCEDURE — 3052F HG A1C>EQUAL 8.0%<EQUAL 9.0%: CPT | Performed by: INTERNAL MEDICINE

## 2020-09-29 PROCEDURE — 99213 OFFICE O/P EST LOW 20 MIN: CPT | Performed by: INTERNAL MEDICINE

## 2020-09-29 RX ORDER — INSULIN GLARGINE 100 [IU]/ML
50 INJECTION, SOLUTION SUBCUTANEOUS 2 TIMES DAILY
Qty: 15 PEN | Refills: 5 | Status: SHIPPED | OUTPATIENT
Start: 2020-09-29 | End: 2020-12-28 | Stop reason: SDUPTHER

## 2020-09-29 RX ORDER — INSULIN LISPRO 100 [IU]/ML
10 INJECTION, SOLUTION INTRAVENOUS; SUBCUTANEOUS
Qty: 15 PEN | Refills: 5 | Status: SHIPPED | OUTPATIENT
Start: 2020-09-29 | End: 2022-04-19 | Stop reason: SDUPTHER

## 2020-09-29 ASSESSMENT — ENCOUNTER SYMPTOMS
ABDOMINAL PAIN: 0
DIARRHEA: 0
SHORTNESS OF BREATH: 0
VOMITING: 0
CHEST TIGHTNESS: 0
CONSTIPATION: 0
NAUSEA: 0

## 2020-09-29 NOTE — PROGRESS NOTES
[address] [facility]  [address] [facility] [address] [address]  Dept: [phone]  Dept Fax: [phone]    Candice Ferris a 53 y.o. female who presents today for her medical conditions/complaints as notedbelow. Amanda Petit is c/o of   Chief Complaint   Patient presents with    Diabetes       HPI:     Here fo rf/u  [facility]   Was going every week to see papo haney  Next appt in one month   Started on rybelus   Last a1c a week ago much improved at 8.9 percent   She has been on the 7 mg for one month total now   Tolerating well   Hoping to get off hualog eventually  Needs refills on this and lantus  Checks bd 4 times a day   No cardiac sxs     Needs to up date eye exam for DM otherwise good on HM   They will be putting on a dex com in a few weeks to monitor continuous sugars         Hemoglobin A1C (%)   Date Value   2020 8.9   2020 11.9 (H)   2019 11.1         ( goal A1C is < 7)   Microalb/Crt. Ratio (mcg/mg creat)   Date Value   2020 96 (H)     LDL Cholesterol (mg/dL)   Date Value   2020            (goal LDL is <100)   AST (U/L)   Date Value   2020 20     ALT (U/L)   Date Value   2020 24     BUN (mg/dL)   Date Value   2020 13     BP Readings from Last 3 Encounters:   20 110/72   20 106/72   09/15/20 92/72          (goal 120/80)    No past medical history on file.    Past Surgical History:   Procedure Laterality Date     SECTION       SECTION      ENDOMETRIAL ABLATION      TUBAL LIGATION      UMBILICAL HERNIA REPAIR         Family History   Problem Relation Age of Onset    Ovarian Cancer Mother     Other Father         intracranial hemorrhage     Ovarian Cancer Maternal Grandmother     Stroke Maternal Grandmother     Diabetes Maternal Grandfather     Rheum Arthritis Paternal Grandmother     Diabetes Paternal Grandfather     High Blood Pressure Paternal Grandfather     Colon Cancer Paternal Grandfather     Colon Cancer Brother     Cancer Brother         AML       Social History     Tobacco Use    Smoking status: Never Smoker    Smokeless tobacco: Never Used   Substance Use Topics    Alcohol use: Yes      Current Outpatient Medications   Medication Sig Dispense Refill    insulin glargine (LANTUS SOLOSTAR) 100 UNIT/ML injection pen Inject 50 Units into the skin 2 times daily 15 pen 5    insulin lispro, 1 Unit Dial, 100 UNIT/ML SOPN Inject 10 Units into the skin 3 times daily (with meals) 15 pen 5    aspirin 81 MG EC tablet Take 1 tablet by mouth daily 90 tablet 3    Semaglutide (RYBELSUS) 7 MG TABS Take 1 tablet by mouth every morning 30 minutes prior to consuming any food or other medication.  30 tablet 0    Multiple Vitamins-Minerals (THERAPEUTIC MULTIVITAMIN-MINERALS) tablet Take 1 tablet by mouth daily      Probiotic Product (PROBIOTIC PO) Take 1 capsule by mouth daily      vitamin D (CHOLECALCIFEROL) 25 MCG (1000 UT) TABS tablet Take 1,000 Units by mouth daily      vitamin B-12 (CYANOCOBALAMIN) 500 MCG tablet Take 500 mcg by mouth daily      levothyroxine (SYNTHROID) 88 MCG tablet TAKE 1 TABLET BY MOUTH  DAILY 30 tablet 1    lisinopril-hydroCHLOROthiazide (PRINZIDE;ZESTORETIC) 10-12.5 MG per tablet Take 1 tablet by mouth daily 30 tablet 1    metFORMIN (GLUCOPHAGE) 1000 MG tablet Take 1 tablet by mouth 2 times daily (with meals) 60 tablet 1    gemfibrozil (LOPID) 600 MG tablet TAKE 1 TABLET BY MOUTH 2 TIMES A DAY 60 tablet 1    spironolactone (ALDACTONE) 25 MG tablet TAKE 1 TABLET BY MOUTH ONE TIME A DAY 30 tablet 1    omeprazole (PRILOSEC) 20 MG delayed release capsule TAKE 1 CAPSULE BY MOUTH ONE TIME A DAY 30 capsule 1    Icosapent Ethyl (VASCEPA) 1 g CAPS capsule Take 2 capsules by mouth 2 times daily 360 capsule 2    atorvastatin (LIPITOR) 40 MG tablet Take 1 tablet by mouth daily 90 tablet 5    blood glucose test headaches. Psychiatric/Behavioral: Negative for sleep disturbance. Objective:      Physical Exam  Vitals signs and nursing note reviewed. Constitutional:       General: She is not in acute distress. Appearance: Normal appearance. She is well-developed. She is obese. She is not ill-appearing, toxic-appearing or diaphoretic. HENT:      Head: Normocephalic and atraumatic. Right Ear: External ear normal.      Left Ear: External ear normal.   Neck:      Musculoskeletal: Normal range of motion and neck supple. No neck rigidity. Thyroid: No thyroid mass or thyroid tenderness. Vascular: No carotid bruit. Cardiovascular:      Rate and Rhythm: Normal rate and regular rhythm. No extrasystoles are present. Pulses: Normal pulses. Heart sounds: Normal heart sounds, S1 normal and S2 normal. Heart sounds not distant. No murmur. Pulmonary:      Effort: Pulmonary effort is normal. No bradypnea, accessory muscle usage, prolonged expiration, respiratory distress or retractions. Breath sounds: Normal breath sounds and air entry. No stridor, decreased air movement or transmitted upper airway sounds. No decreased breath sounds, wheezing, rhonchi or rales. Abdominal:      Palpations: There is no hepatomegaly or splenomegaly. Musculoskeletal:      Right shoulder: She exhibits no tenderness, no bony tenderness, no pain, no spasm, normal pulse and normal strength. Left knee: She exhibits normal range of motion, no swelling, no effusion, no ecchymosis, no deformity, no laceration and no erythema. No tenderness found. Lumbar back: She exhibits spasm. She exhibits normal range of motion, no tenderness, no bony tenderness, no swelling, no edema, no deformity, no pain and normal pulse. Right lower leg: No edema. Left lower leg: No edema. Lymphadenopathy:      Cervical: No cervical adenopathy. Skin:     General: Skin is warm and dry.       Capillary Refill: Capillary exercise. Patient agreed with treatment plan. Follow up asdirected.      Electronically signed by Mulu Bailey DO on 9/29/2020 at 9:08 AM

## 2020-09-30 NOTE — TELEPHONE ENCOUNTER
Noted Rxs signed by provider - thank you! [employer] message sent to patient. For Pharmacy Admin Tracking Only    PHSO: Yes  Total # of Interventions Recommended: 4  - New Order #: 2 New Medication Order Reason(s): Adherence, Patient Preference  - Refills Provided #: 1  - Updated Order #: 1 Updated Order Reason(s):  Other  Recommended intervention potential cost savings: 1  Total Interventions Accepted: 4  Time Spent (min): [address], PharmD  [address]

## 2020-10-01 ENCOUNTER — CLINICAL DOCUMENTATION (OUTPATIENT)
Dept: PHARMACY | Facility: CLINIC | Age: 50
End: 2020-10-01

## 2020-10-12 ENCOUNTER — HOSPITAL ENCOUNTER (OUTPATIENT)
Age: 50
Setting detail: SPECIMEN
Discharge: HOME | End: 2020-10-12
Payer: COMMERCIAL

## 2020-10-12 LAB — SARS-COV-2 ANTIBODY, TOTAL: NEGATIVE

## 2020-10-13 ENCOUNTER — HOSPITAL ENCOUNTER (OUTPATIENT)
Dept: PHARMACY | Age: 50
Setting detail: THERAPIES SERIES
Discharge: HOME | End: 2020-10-13
Payer: COMMERCIAL

## 2020-10-13 VITALS
BODY MASS INDEX: 32.78 KG/M2 | TEMPERATURE: 96.9 F | OXYGEN SATURATION: 98 % | WEIGHT: 222 LBS | SYSTOLIC BLOOD PRESSURE: 104 MMHG | HEART RATE: 77 BPM | DIASTOLIC BLOOD PRESSURE: 66 MMHG

## 2020-10-13 PROCEDURE — 99212 OFFICE O/P EST SF 10 MIN: CPT

## 2020-10-13 RX ORDER — BLOOD-GLUCOSE SENSOR
EACH MISCELLANEOUS
Qty: 9 EACH | Refills: 3 | Status: SHIPPED | OUTPATIENT
Start: 2020-10-13 | End: 2021-06-08 | Stop reason: SDUPTHER

## 2020-10-13 RX ORDER — BLOOD-GLUCOSE TRANSMITTER
EACH MISCELLANEOUS
Qty: 1 EACH | Refills: 3 | Status: SHIPPED | OUTPATIENT
Start: 2020-10-13 | End: 2022-01-26 | Stop reason: SDUPTHER

## 2020-10-13 NOTE — PROGRESS NOTES
Diabetic Medication Management Program  [address]. [address]  Phone: [phone]  Fax: [phone]    NAME: Jassi Fierro RECORD NUMBER:  [id number]  AGE: 53 y.o. GENDER: female  : 1970  EPISODE DATE:  10/13/2020     Ms. Fadi Rodas was referred to [facility] by Dr. Vaibhav Luque. Patient acknowledges working in consult agreement with clinical pharmacist and this provider. Goals per referral:   Fasting blood glucose: < 130  Peak postprandial glucose: < 180  A1C: < 7    SUBJECTIVE     Ms. Fadi Rodas is a 53 y.o. female here for the Diabetes Service for self-management education, medication review including over the counter medications and herbal products, overall wellbeing assessment, transition of care and any needed adjustments with updates and recommendations communicated to the referring physician. Patient Findings:   Current diabetic medications: See med list  Missed doses no  Medication changes  no  Diet changes  yes  Activity changes  Yes - walking with friend - last week 3 days  Emergency Room Visit or Hospitalization no   Reason for visit: no  Acute Illness/new problems  no  Symptoms of hypoglycemia no - none  Symptoms of hyperglycemia no - none  Medication adverse reactions Uneasy belly feeling occurs first thing in the mornign and sometimes in the evening after dinner but patient states this is tolerable. OBJECTIVE     PMHx:  No past medical history on file.     Social History:    Social History     Tobacco Use    Smoking status: Never Smoker    Smokeless tobacco: Never Used   Substance Use Topics    Alcohol use: Yes     Pertinent Labs:    Lab Results   Component Value Date    LABA1C 8.9 2020    LABA1C 11.9 (H) 2020    LABA1C 11.1 2019     Lab Results   Component Value Date    HDL 21 (L) 2020     Lab Results   Component Value Date    CREATININE 0.49 (L) 2020    BUN 13 06/26/2020     (L) 06/26/2020    K 4.0 06/26/2020    CL 94 (L) 06/26/2020    CO2 19 (L) 06/26/2020     Lab Results   Component Value Date    ALT 24 06/26/2020     Weight:  Wt Readings from Last 3 Encounters:   10/13/20 222 lb (100.7 kg)   09/29/20 225 lb (102.1 kg)   09/24/20 227 lb 12.8 oz (103.3 kg)     Blood Pressure:  BP Readings from Last 3 Encounters:   10/13/20 104/66   09/29/20 110/72   09/24/20 106/72     Current medications:    Current Outpatient Medications:     insulin glargine (LANTUS SOLOSTAR) 100 UNIT/ML injection pen, Inject 50 Units into the skin 2 times daily, Disp: 15 pen, Rfl: 5    insulin lispro, 1 Unit Dial, 100 UNIT/ML SOPN, Inject 10 Units into the skin 3 times daily (with meals), Disp: 15 pen, Rfl: 5    aspirin 81 MG EC tablet, Take 1 tablet by mouth daily, Disp: 90 tablet, Rfl: 3    blood glucose test strips (PRODIGY NO CODING BLOOD GLUC) strip, Use to test blood sugar up to 5 times daily, Disp: 500 each, Rfl: 3    Blood Glucose Monitoring Suppl (PRODIGY AUTOCODE BLOOD GLUCOSE) w/Device KIT, Use as directed., Disp: 1 kit, Rfl: 0    Insulin Pen Needle (PEN NEEDLES 31GX5/16\") 31G X 8 MM MISC, Use to inject insulin 5 times per day, Disp: 500 each, Rfl: 3    Prodigy Lancets 28G MISC, Use to test blood sugar up to 5 times daily, Disp: 500 each, Rfl: 3    Semaglutide (RYBELSUS) 7 MG TABS, Take 1 tablet by mouth every morning 30 minutes prior to consuming any food or other medication. , Disp: 30 tablet, Rfl: 0    Multiple Vitamins-Minerals (THERAPEUTIC MULTIVITAMIN-MINERALS) tablet, Take 1 tablet by mouth daily, Disp: , Rfl:     Probiotic Product (PROBIOTIC PO), Take 1 capsule by mouth daily, Disp: , Rfl:     vitamin D (CHOLECALCIFEROL) 25 MCG (1000 UT) TABS tablet, Take 1,000 Units by mouth daily, Disp: , Rfl:     vitamin B-12 (CYANOCOBALAMIN) 500 MCG tablet, Take 500 mcg by mouth daily, Disp: , Rfl:     levothyroxine (SYNTHROID) 88 MCG tablet, TAKE 1 TABLET BY MOUTH  DAILY, AM

## 2020-10-15 RX ORDER — LEVOTHYROXINE SODIUM 88 UG/1
TABLET ORAL
Qty: 30 TABLET | Refills: 1 | Status: SHIPPED | OUTPATIENT
Start: 2020-10-15 | End: 2020-12-28 | Stop reason: SDUPTHER

## 2020-10-15 RX ORDER — LISINOPRIL AND HYDROCHLOROTHIAZIDE 12.5; 1 MG/1; MG/1
TABLET ORAL
Qty: 30 TABLET | Refills: 1 | Status: SHIPPED | OUTPATIENT
Start: 2020-10-15 | End: 2020-12-24 | Stop reason: SDUPTHER

## 2020-10-15 RX ORDER — SPIRONOLACTONE 25 MG/1
TABLET ORAL
Qty: 30 TABLET | Refills: 1 | Status: SHIPPED | OUTPATIENT
Start: 2020-10-15 | End: 2020-12-28 | Stop reason: SDUPTHER

## 2020-10-15 RX ORDER — OMEPRAZOLE 20 MG/1
CAPSULE, DELAYED RELEASE ORAL
Qty: 30 CAPSULE | Refills: 1 | Status: SHIPPED | OUTPATIENT
Start: 2020-10-15 | End: 2020-12-21

## 2020-10-15 RX ORDER — GEMFIBROZIL 600 MG/1
TABLET, FILM COATED ORAL
Qty: 60 TABLET | Refills: 1 | Status: SHIPPED | OUTPATIENT
Start: 2020-10-15 | End: 2020-12-24

## 2020-10-20 ENCOUNTER — NURSE TRIAGE (OUTPATIENT)
Dept: OTHER | Facility: CLINIC | Age: 50
End: 2020-10-20

## 2020-10-21 ENCOUNTER — HOSPITAL ENCOUNTER (OUTPATIENT)
Age: 50
Discharge: HOME | End: 2020-10-21
Payer: COMMERCIAL

## 2020-10-21 ENCOUNTER — HOSPITAL ENCOUNTER (OUTPATIENT)
Age: 50
Discharge: HOME | End: 2020-10-21

## 2020-10-21 LAB
SARS-COV-2, RAPID: NORMAL
SARS-COV-2: NORMAL
SARS-COV-2: NOT DETECTED
SOURCE: NORMAL

## 2020-10-21 PROCEDURE — U0003 INFECTIOUS AGENT DETECTION BY NUCLEIC ACID (DNA OR RNA); SEVERE ACUTE RESPIRATORY SYNDROME CORONAVIRUS 2 (SARS-COV-2) (CORONAVIRUS DISEASE [COVID-19]), AMPLIFIED PROBE TECHNIQUE, MAKING USE OF HIGH THROUGHPUT TECHNOLOGIES AS DESCRIBED BY CMS-2020-01-R: HCPCS

## 2020-10-22 ENCOUNTER — OFFICE VISIT (OUTPATIENT)
Dept: FAMILY MEDICINE CLINIC | Age: 50
End: 2020-10-22
Payer: COMMERCIAL

## 2020-10-22 VITALS
HEART RATE: 79 BPM | SYSTOLIC BLOOD PRESSURE: 107 MMHG | HEIGHT: 69 IN | TEMPERATURE: 97.2 F | RESPIRATION RATE: 16 BRPM | OXYGEN SATURATION: 96 % | DIASTOLIC BLOOD PRESSURE: 74 MMHG | WEIGHT: 224 LBS | BODY MASS INDEX: 33.18 KG/M2

## 2020-10-22 PROCEDURE — 99213 OFFICE O/P EST LOW 20 MIN: CPT | Performed by: INTERNAL MEDICINE

## 2020-10-22 ASSESSMENT — ENCOUNTER SYMPTOMS
CHOKING: 0
CHANGE IN BOWEL HABIT: 1
SINUS PAIN: 0
STRIDOR: 0
CHEST TIGHTNESS: 0
ABDOMINAL PAIN: 0
COUGH: 0
NAUSEA: 1
VISUAL CHANGE: 0
RHINORRHEA: 0
SORE THROAT: 0
VOMITING: 1
WHEEZING: 0
SINUS PRESSURE: 0
SHORTNESS OF BREATH: 0
DIARRHEA: 0
CONSTIPATION: 0
SWOLLEN GLANDS: 0

## 2020-10-22 NOTE — PROGRESS NOTES
[address] [facility]  [address] [facility] [address] [address]  Dept: [phone]  Dept Fax: [phone]    Rubina Bush a 53 y.o. female who presents today for her medical conditions/complaints as notedbelow. Suhail Agabhavani is c/o of   Chief Complaint   Patient presents with   Gloriajean Seeds Other     needs cleared to go back to work from getting tested for covid. pt last symptom was on tuesday per nurse triage in chart. pt did have a negative test yesterday. HPI:     Had gi symptoms started Monday at work   Left early   Had dizziness, nausea and 1-2 episodes of vomiting   No syncope   sxs resolved after rest and fluids   Slight fatigue on tuesday and took off work then they wanted her to covid test   covid came back negative last night     Nausea & Vomiting   This is a new problem. The current episode started in the past 7 days. The problem occurs intermittently. The problem has been resolved. Associated symptoms include anorexia, a change in bowel habit, fatigue, nausea, vertigo and vomiting. Pertinent negatives include no abdominal pain, arthralgias, chest pain, chills, congestion, coughing, diaphoresis, fever, headaches, joint swelling, myalgias, neck pain, numbness, rash, sore throat, swollen glands, urinary symptoms, visual change or weakness. The symptoms are aggravated by drinking, exertion, stress, twisting and walking. She has tried rest, sleep, walking, position changes, relaxation, lying down and drinking for the symptoms. The treatment provided significant relief. Hemoglobin A1C (%)   Date Value   09/24/2020 8.9   06/26/2020 11.9 (H)   03/30/2019 11.1         ( goal A1C is < 7)   Microalb/Crt.  Ratio (mcg/mg creat)   Date Value   06/26/2020 96 (H)     LDL Cholesterol (mg/dL)   Date Value   06/26/2020            (goal LDL is <100)   AST (U/L)   Date Value   06/26/2020 20     ALT (U/L)   Date Value   06/26/2020 24     BUN (mg/dL)   Date Value 2020 13     BP Readings from Last 3 Encounters:   10/22/20 107/74   10/13/20 104/66   20 110/72          (goal 120/80)    No past medical history on file.    Past Surgical History:   Procedure Laterality Date     SECTION       SECTION      ENDOMETRIAL ABLATION      TUBAL LIGATION      UMBILICAL HERNIA REPAIR         Family History   Problem Relation Age of Onset    Ovarian Cancer Mother     Other Father         intracranial hemorrhage     Ovarian Cancer Maternal Grandmother     Stroke Maternal Grandmother     Diabetes Maternal Grandfather     Rheum Arthritis Paternal Grandmother     Diabetes Paternal Grandfather     High Blood Pressure Paternal Grandfather     Colon Cancer Paternal Grandfather     Colon Cancer Brother     Cancer Brother         AML       Social History     Tobacco Use    Smoking status: Never Smoker    Smokeless tobacco: Never Used   Substance Use Topics    Alcohol use: Yes      Current Outpatient Medications   Medication Sig Dispense Refill    omeprazole (PRILOSEC) 20 MG delayed release capsule TAKE 1 CAPSULE BY MOUTH ONE TIME A DAY 30 capsule 1    levothyroxine (SYNTHROID) 88 MCG tablet TAKE 1 TABLET BY MOUTH ONE TIME A DAY 30 tablet 1    spironolactone (ALDACTONE) 25 MG tablet TAKE 1 TABLET BY MOUTH ONE TIME A DAY 30 tablet 1    gemfibrozil (LOPID) 600 MG tablet TAKE 1 TABLET BY MOUTH 2 TIMES A DAY 60 tablet 1    metFORMIN (GLUCOPHAGE) 1000 MG tablet TAKE 1 TABLET BY MOUTH 2 TIMES A DAY WITH MEALS 60 tablet 1    lisinopril-hydroCHLOROthiazide (PRINZIDE;ZESTORETIC) 10-12.5 MG per tablet TAKE 1 TABLET BY MOUTH ONE TIME A DAY 30 tablet 1    Continuous Blood Gluc Transmit (DEXCOM G6 TRANSMITTER) MISC Change every 3 months as directed for continuous glucose monitoring 1 each 3    Continuous Blood Gluc Sensor (DEXCOM G6 SENSOR) MISC Change every 10 days as directed for continuous glucose monitoring 9 each 3    insulin glargine (LANTUS SOLOSTAR) 100 UNIT/ML injection pen Inject 50 Units into the skin 2 times daily 15 pen 5    insulin lispro, 1 Unit Dial, 100 UNIT/ML SOPN Inject 10 Units into the skin 3 times daily (with meals) 15 pen 5    aspirin 81 MG EC tablet Take 1 tablet by mouth daily 90 tablet 3    blood glucose test strips (PRODIGY NO CODING BLOOD GLUC) strip Use to test blood sugar up to 5 times daily 500 each 3    Blood Glucose Monitoring Suppl (PRODIGY AUTOCODE BLOOD GLUCOSE) w/Device KIT Use as directed. 1 kit 0    Insulin Pen Needle (PEN NEEDLES 31GX5/16\") 31G X 8 MM MISC Use to inject insulin 5 times per day 500 each 3    Prodigy Lancets 28G MISC Use to test blood sugar up to 5 times daily 500 each 3    Semaglutide (RYBELSUS) 7 MG TABS Take 1 tablet by mouth every morning 30 minutes prior to consuming any food or other medication. 30 tablet 0    Multiple Vitamins-Minerals (THERAPEUTIC MULTIVITAMIN-MINERALS) tablet Take 1 tablet by mouth daily      Probiotic Product (PROBIOTIC PO) Take 1 capsule by mouth daily      vitamin D (CHOLECALCIFEROL) 25 MCG (1000 UT) TABS tablet Take 1,000 Units by mouth daily      vitamin B-12 (CYANOCOBALAMIN) 500 MCG tablet Take 500 mcg by mouth daily      Icosapent Ethyl (VASCEPA) 1 g CAPS capsule Take 2 capsules by mouth 2 times daily 360 capsule 2    atorvastatin (LIPITOR) 40 MG tablet Take 1 tablet by mouth daily 90 tablet 5     No current facility-administered medications for this visit.       Allergies   Allergen Reactions    Codeine     Tricor [Fenofibrate]           Health Maintenance   Topic Date Due    Diabetic retinal exam  03/28/1981    Hepatitis B vaccine (2 of 3 - Risk 3-dose series) 03/08/1993    Flu vaccine (1) 09/01/2020    Diabetic microalbuminuria test  06/26/2021    Lipid screen  06/26/2021    Potassium monitoring  06/26/2021    Creatinine monitoring  06/26/2021    A1C test (Diabetic or Prediabetic)  09/24/2021    Diabetic foot exam  09/29/2021    Cervical cancer screen  09/15/2023    DTaP/Tdap/Td vaccine (4 - Td) 02/26/2030    Pneumococcal 0-64 years Vaccine  Completed    Hepatitis A vaccine  Aged Out    Hib vaccine  Aged Out    Meningococcal (ACWY) vaccine  Aged Out    HIV screen  Discontinued       Subjective:     Review of Systems   Constitutional: Positive for activity change and fatigue. Negative for appetite change, chills, diaphoresis, fever and unexpected weight change. HENT: Negative for congestion, ear discharge, ear pain, postnasal drip, rhinorrhea, sinus pressure, sinus pain, sneezing and sore throat. Eyes: Negative for visual disturbance. Respiratory: Negative for cough, choking, chest tightness, shortness of breath, wheezing and stridor. Cardiovascular: Negative for chest pain, palpitations and leg swelling. Gastrointestinal: Positive for anorexia, change in bowel habit, nausea and vomiting. Negative for abdominal pain, constipation and diarrhea. Musculoskeletal: Negative for arthralgias, joint swelling, myalgias and neck pain. Skin: Negative for rash. Allergic/Immunologic: Positive for immunocompromised state. Neurological: Positive for dizziness and vertigo. Negative for weakness, numbness and headaches. Hematological: Negative for adenopathy. Psychiatric/Behavioral: Positive for decreased concentration. Negative for confusion and sleep disturbance. Objective:      Physical Exam  Vitals signs and nursing note reviewed. Constitutional:       General: She is not in acute distress. Appearance: Normal appearance. She is obese. She is not ill-appearing, toxic-appearing or diaphoretic. HENT:      Head: Normocephalic and atraumatic. Mouth/Throat:      Mouth: Mucous membranes are moist.   Cardiovascular:      Rate and Rhythm: Normal rate and regular rhythm. Pulmonary:      Effort: Pulmonary effort is normal.      Breath sounds: Normal breath sounds. Abdominal:      General: Abdomen is flat.  Bowel sounds are normal. There is no distension. There are no signs of injury. Palpations: Abdomen is soft. Tenderness: There is no abdominal tenderness. Hernia: A hernia is present. Hernia is present in the umbilical area and ventral area. Skin:     Capillary Refill: Capillary refill takes less than 2 seconds. Neurological:      General: No focal deficit present. Mental Status: She is alert and oriented to person, place, and time. Mental status is at baseline. Cranial Nerves: No cranial nerve deficit. /74 (Site: Left Upper Arm, Position: Sitting, Cuff Size: Medium Adult)   Pulse 79   Temp 97.2 °F (36.2 °C) (Tympanic)   Resp 16   Ht 5' 9\" (1.753 m)   Wt 224 lb (101.6 kg)   LMP 09/22/2020 (Approximate)   SpO2 96%   BMI 33.08 kg/m²     Assessment:       Diagnosis Orders   1. Acute gastroenteritis               Plan:       No follow-ups on file. No orders of the defined types were placed in this encounter. No orders of the defined types were placed in this encounter. push fluids and rest   Return to work on Monday 10/26 as long as continues completely symptom free   Appears to have been viral GI episode     Call with q/c   Forms filled out. Patientgiven educational materials - see patient instructions. Discussed use, benefit,and side effects of prescribed medications. All patient questions answered. Ptvoiced understanding. Reviewed health maintenance. Instructed to continue currentmedications, diet and exercise. Patient agreed with treatment plan. Follow up asdirected.      Electronically signed by Warden Aase, DO on 10/22/2020 at 2:59 PM

## 2020-10-22 NOTE — PROGRESS NOTES
Visit Information    Have you changed or started any medications since your last visit including any over-the-counter medicines, vitamins, or herbal medicines? no   Are you having any side effects from any of your medications? -  no  Have you stopped taking any of your medications? Is so, why? -  no    Have you seen any other physician or provider since your last visit? No  Have you had any other diagnostic tests since your last visit? No  Have you been seen in the emergency room and/or had an admission to a hospital since we last saw you? No  Have you had your routine dental cleaning in the past 6 months? no    Have you activated your [employer] account? If not, what are your barriers?  Yes     Patient Care Team:  Cherylene Imperial, DO as PCP - General (Family Medicine)  Cherylene Imperial, DO as PCP - [facility] Provider    Medical History Review  Past Medical, Family, and Social History reviewed and does contribute to the patient presenting condition    Health Maintenance   Topic Date Due    Diabetic retinal exam  03/28/1981    Hepatitis B vaccine (2 of 3 - Risk 3-dose series) 03/08/1993    Flu vaccine (1) 09/01/2020    Diabetic microalbuminuria test  06/26/2021    Lipid screen  06/26/2021    Potassium monitoring  06/26/2021    Creatinine monitoring  06/26/2021    A1C test (Diabetic or Prediabetic)  09/24/2021    Diabetic foot exam  09/29/2021    Cervical cancer screen  09/15/2023    DTaP/Tdap/Td vaccine (4 - Td) 02/26/2030    Pneumococcal 0-64 years Vaccine  Completed    Hepatitis A vaccine  Aged Out    Hib vaccine  Aged Out    Meningococcal (ACWY) vaccine  Aged Out    HIV screen  Discontinued

## 2020-10-29 RX ORDER — ORAL SEMAGLUTIDE 14 MG/1
TABLET ORAL
Qty: 30 TABLET | Refills: 2 | Status: SHIPPED | OUTPATIENT
Start: 2020-10-29 | End: 2021-01-21 | Stop reason: SDUPTHER

## 2020-11-10 ENCOUNTER — HOSPITAL ENCOUNTER (OUTPATIENT)
Dept: PHARMACY | Age: 50
Setting detail: THERAPIES SERIES
Discharge: HOME | End: 2020-11-10
Payer: COMMERCIAL

## 2020-11-10 VITALS
WEIGHT: 222.6 LBS | BODY MASS INDEX: 32.87 KG/M2 | OXYGEN SATURATION: 98 % | HEART RATE: 85 BPM | TEMPERATURE: 96.5 F | SYSTOLIC BLOOD PRESSURE: 101 MMHG | DIASTOLIC BLOOD PRESSURE: 70 MMHG

## 2020-11-10 PROCEDURE — 99211 OFF/OP EST MAY X REQ PHY/QHP: CPT

## 2020-11-10 NOTE — PROGRESS NOTES
Diabetic Medication Management Program  [address]. [address]  Phone: [phone]  Fax: [phone]    NAME: Adan Galloway RECORD NUMBER:  [id number]  AGE: 53 y.o. GENDER: female  : 1970  EPISODE DATE:  11/10/2020     Ms. Catie Monroy was referred to [facility] by Dr. Abhinav Ca. Patient acknowledges working in consult agreement with clinical pharmacist and this provider. Goals per referral:   Fasting blood glucose: < 130  Peak postprandial glucose: < 180  A1C: < 7    SUBJECTIVE     Ms. Catie Monroy is a 53 y.o. female here for the Diabetes Service for self-management education, medication review including over the counter medications and herbal products, overall wellbeing assessment, transition of care and any needed adjustments with updates and recommendations communicated to the referring physician. Patient Findings:     Current diabetic medications: see med list  Missed doses no  Medication changes  no  Diet changes  yes  Activity changes  no  Emergency Room Visit or Hospitalization no   Reason for visit: no  Acute Illness/new problems  no  Symptoms of hypoglycemia yes - Last night low alarm rang - verified with poc test and BG >90  Symptoms of hyperglycemia no - none  Medication adverse reactions none    OBJECTIVE     PMHx:  No past medical history on file.     Social History:    Social History     Tobacco Use    Smoking status: Never Smoker    Smokeless tobacco: Never Used   Substance Use Topics    Alcohol use: Yes     Pertinent Labs:    Lab Results   Component Value Date    LABA1C 8.9 2020    LABA1C 11.9 (H) 2020    LABA1C 11.1 2019     Lab Results   Component Value Date    HDL 21 (L) 2020     Lab Results   Component Value Date    CREATININE 0.49 (L) 2020    BUN 13 2020     (L) 2020    K 4.0 2020    CL 94 (L) 2020    CO2 19 (L) 2020     Lab Results   Component Value Date    ALT 24 06/26/2020     Weight:  Wt Readings from Last 3 Encounters:   11/10/20 222 lb 9.6 oz (101 kg)   10/22/20 224 lb (101.6 kg)   10/13/20 222 lb (100.7 kg)     Blood Pressure:  BP Readings from Last 3 Encounters:   11/10/20 101/70   10/22/20 107/74   10/13/20 104/66     Current medications:    Current Outpatient Medications:     Semaglutide (RYBELSUS) 14 MG TABS, Take 1 tablet by mouth every morning 30 minutes prior to other food, drink, or medication, Disp: 30 tablet, Rfl: 2    omeprazole (PRILOSEC) 20 MG delayed release capsule, TAKE 1 CAPSULE BY MOUTH ONE TIME A DAY, Disp: 30 capsule, Rfl: 1    levothyroxine (SYNTHROID) 88 MCG tablet, TAKE 1 TABLET BY MOUTH ONE TIME A DAY, Disp: 30 tablet, Rfl: 1    spironolactone (ALDACTONE) 25 MG tablet, TAKE 1 TABLET BY MOUTH ONE TIME A DAY, Disp: 30 tablet, Rfl: 1    gemfibrozil (LOPID) 600 MG tablet, TAKE 1 TABLET BY MOUTH 2 TIMES A DAY, Disp: 60 tablet, Rfl: 1    metFORMIN (GLUCOPHAGE) 1000 MG tablet, TAKE 1 TABLET BY MOUTH 2 TIMES A DAY WITH MEALS, Disp: 60 tablet, Rfl: 1    lisinopril-hydroCHLOROthiazide (PRINZIDE;ZESTORETIC) 10-12.5 MG per tablet, TAKE 1 TABLET BY MOUTH ONE TIME A DAY, Disp: 30 tablet, Rfl: 1    Continuous Blood Gluc Transmit (DEXCOM G6 TRANSMITTER) MISC, Change every 3 months as directed for continuous glucose monitoring, Disp: 1 each, Rfl: 3    Continuous Blood Gluc Sensor (DEXCOM G6 SENSOR) MISC, Change every 10 days as directed for continuous glucose monitoring, Disp: 9 each, Rfl: 3    insulin glargine (LANTUS SOLOSTAR) 100 UNIT/ML injection pen, Inject 50 Units into the skin 2 times daily, Disp: 15 pen, Rfl: 5    insulin lispro, 1 Unit Dial, 100 UNIT/ML SOPN, Inject 10 Units into the skin 3 times daily (with meals), Disp: 15 pen, Rfl: 5    aspirin 81 MG EC tablet, Take 1 tablet by mouth daily, Disp: 90 tablet, Rfl: 3    blood glucose test strips (PRODIGY NO CODING BLOOD GLUC) strip, Use to test blood sugar up to 5 times daily, Disp: 500 each, Rfl: 3    Blood Glucose Monitoring Suppl (PRODIGY AUTOCODE BLOOD GLUCOSE) w/Device KIT, Use as directed., Disp: 1 kit, Rfl: 0    Insulin Pen Needle (PEN NEEDLES 31GX5/16\") 31G X 8 MM MISC, Use to inject insulin 5 times per day, Disp: 500 each, Rfl: 3    Prodigy Lancets 28G MISC, Use to test blood sugar up to 5 times daily, Disp: 500 each, Rfl: 3    Multiple Vitamins-Minerals (THERAPEUTIC MULTIVITAMIN-MINERALS) tablet, Take 1 tablet by mouth daily, Disp: , Rfl:     Probiotic Product (PROBIOTIC PO), Take 1 capsule by mouth daily, Disp: , Rfl:     vitamin D (CHOLECALCIFEROL) 25 MCG (1000 UT) TABS tablet, Take 1,000 Units by mouth daily, Disp: , Rfl:     vitamin B-12 (CYANOCOBALAMIN) 500 MCG tablet, Take 500 mcg by mouth daily, Disp: , Rfl:     Icosapent Ethyl (VASCEPA) 1 g CAPS capsule, Take 2 capsules by mouth 2 times daily, Disp: 360 capsule, Rfl: 2    atorvastatin (LIPITOR) 40 MG tablet, Take 1 tablet by mouth daily, Disp: 90 tablet, Rfl: 5    Immunizations:   Most Recent Immunizations   Administered Date(s) Administered    DT (pediatric) 01/01/1998    Hepatitis B 02/08/1993    Influenza A (J9X2-00) Vaccine PF IM 10/28/2009    Influenza Virus Vaccine 10/14/2020    Influenza, MDCK Quadv, IM, PF (Flucelvax 4 yrs and older) 01/08/2020    Influenza, Quadv, IM, PF (6 mo and older Fluzone, Flulaval, Fluarix, and 3 yrs and older Afluria) 02/01/2019    Pneumococcal Polysaccharide ([id number]) 11/20/2015    Tdap (Boostrix, Adacel) 02/26/2020     Home Blood Glucose Results:   Blood glucose trends noted: Patient doing very well since using CGM      ASSESSMENT     Recent A1c:     Lab Results   Component Value Date    LABA1C 8.9 09/24/2020    LABA1C 11.9 (H) 06/26/2020    LABA1C 11.1 03/30/2019        Eating patterns: continues low carb diet     Blood Glucose Testing: Patient is currently using CGM - Trained patient on Dexcom sensor application and use of kenisha on smart phone. Patient voiced understanding. Diabetic Regimen/Compliance: Compliant with regimen.   Has not needed fast acting insulin at night     Other Medication Compliance: Compliant with regimen      PLAN     Physician Follow-up:  As scheduled    Medication Management Follow-up:   Diabetes Service  4 weeks    Electronically signed by Zion Officer, [address] [place] [address] on 11/10/2020 at 7:41 AM

## 2020-11-20 RX ORDER — ATORVASTATIN CALCIUM 20 MG/1
TABLET, FILM COATED ORAL
Qty: 30 TABLET | Refills: 1 | Status: SHIPPED | OUTPATIENT
Start: 2020-11-20 | End: 2020-12-28 | Stop reason: SDUPTHER

## 2020-12-08 ENCOUNTER — HOSPITAL ENCOUNTER (OUTPATIENT)
Dept: PHARMACY | Age: 50
Setting detail: THERAPIES SERIES
Discharge: HOME | End: 2020-12-08
Payer: COMMERCIAL

## 2020-12-08 VITALS
SYSTOLIC BLOOD PRESSURE: 106 MMHG | HEART RATE: 80 BPM | WEIGHT: 219.3 LBS | TEMPERATURE: 98.6 F | OXYGEN SATURATION: 97 % | BODY MASS INDEX: 32.38 KG/M2 | DIASTOLIC BLOOD PRESSURE: 72 MMHG

## 2020-12-08 PROCEDURE — 99212 OFFICE O/P EST SF 10 MIN: CPT

## 2020-12-08 NOTE — PROGRESS NOTES
Diabetic Medication Management Program  [address] [place] [address]. [place], [address] [place] [address]  Phone: [phone]  Fax: [phone]    NAME: Alina Patton RECORD NUMBER:  [id number]  AGE: 53 y.o. GENDER: female  : 1970  EPISODE DATE:  2020     Ms. Pooja Mullins was referred to [facility] by Dr. Sonja Edwards. Patient acknowledges working in consult agreement with clinical pharmacist and this provider. Goals per referral:   Fasting blood glucose: < 130  Peak postprandial glucose: < 180  A1C: < 7    SUBJECTIVE     Ms. Pooja Mullins is a 53 y.o. female here for the Diabetes Service for self-management education, medication review including over the counter medications and herbal products, overall wellbeing assessment, transition of care and any needed adjustments with updates and recommendations communicated to the referring physician. Patient Findings:   Current diabetic medications: see med list  Missed doses no  Medication changes  no  Diet changes  no  Activity changes  Not since cold weather - were walking every night - walking a lot at work  Emergency Room Visit or Hospitalization No   Reason for visit: No  Acute Illness/new problems  No - was tested for COVID but was Negative  Symptoms of hypoglycemia no - none  Symptoms of hyperglycemia no - none  Medication adverse reactions none    OBJECTIVE     PMHx:  No past medical history on file.     Social History:    Social History     Tobacco Use    Smoking status: Never Smoker    Smokeless tobacco: Never Used   Substance Use Topics    Alcohol use: Yes     Pertinent Labs:    Lab Results   Component Value Date    LABA1C 8.9 2020    LABA1C 11.9 (H) 2020    LABA1C 11.1 2019     Lab Results   Component Value Date    HDL 21 (L) 2020     Lab Results   Component Value Date    CREATININE 0.49 (L) 2020    BUN 13 2020     (L) 2020    K 4.0 2020    CL 94 aspirin 81 MG EC tablet, Take 1 tablet by mouth daily, Disp: 90 tablet, Rfl: 3    blood glucose test strips (PRODIGY NO CODING BLOOD GLUC) strip, Use to test blood sugar up to 5 times daily, Disp: 500 each, Rfl: 3    Blood Glucose Monitoring Suppl (PRODIGY AUTOCODE BLOOD GLUCOSE) w/Device KIT, Use as directed., Disp: 1 kit, Rfl: 0    Insulin Pen Needle (PEN NEEDLES 31GX5/16\") 31G X 8 MM MISC, Use to inject insulin 5 times per day, Disp: 500 each, Rfl: 3    Prodigy Lancets 28G MISC, Use to test blood sugar up to 5 times daily, Disp: 500 each, Rfl: 3    Multiple Vitamins-Minerals (THERAPEUTIC MULTIVITAMIN-MINERALS) tablet, Take 1 tablet by mouth daily, Disp: , Rfl:     Probiotic Product (PROBIOTIC PO), Take 1 capsule by mouth daily, Disp: , Rfl:     vitamin D (CHOLECALCIFEROL) 25 MCG (1000 UT) TABS tablet, Take 1,000 Units by mouth daily, Disp: , Rfl:     vitamin B-12 (CYANOCOBALAMIN) 500 MCG tablet, Take 500 mcg by mouth daily, Disp: , Rfl:     atorvastatin (LIPITOR) 40 MG tablet, Take 1 tablet by mouth daily, Disp: 90 tablet, Rfl: 5    Immunizations:   Most Recent Immunizations   Administered Date(s) Administered    DT (pediatric) 01/01/1998    Hepatitis B 02/08/1993    Influenza A ([id number]) Vaccine PF IM 10/28/2009    Influenza Virus Vaccine 10/14/2020    Influenza, MDCK Quadv, IM, PF (Flucelvax 4 yrs and older) 01/08/2020    Influenza, Quadv, IM, PF (6 mo and older Fluzone, Flulaval, Fluarix, and 3 yrs and older Afluria) 02/01/2019    Pneumococcal Polysaccharide ([id number]) 11/20/2015    Tdap (Boostrix, Adacel) 02/26/2020     Home Blood Glucose Results:             Blood glucose trends noted: Great improvement!!     ASSESSMENT     Recent A1c:     Lab Results   Component Value Date    LABA1C 8.9 09/24/2020    LABA1C 11.9 (H) 06/26/2020    LABA1C 11.1 03/30/2019        Eating patterns: Continues to log dietary intake.                    Blood Glucose Testing: Patient is currently using CGM - No issues with Dexcom but still waiting on next shipment of sensors to arrive from [facility]     Diabetic Regimen/Compliance: Compliant with regimen. Tolerating Rybelsus at 14mg dose. Adapting to previous nausea with timing of food after med. Other Medication Compliance: Compliant with regimen.       PLAN           Physician Follow-up:  As scheduled    Medication Management Follow-up:   Diabetes Service  4 weeks    Electronically signed by Zion Officer, [facility] on 12/8/2020 at 7:53 AM

## 2020-12-21 RX ORDER — OMEPRAZOLE 20 MG/1
CAPSULE, DELAYED RELEASE ORAL
Qty: 30 CAPSULE | Refills: 1 | Status: SHIPPED | OUTPATIENT
Start: 2020-12-21 | End: 2021-02-13

## 2020-12-24 RX ORDER — LISINOPRIL AND HYDROCHLOROTHIAZIDE 12.5; 1 MG/1; MG/1
TABLET ORAL
Qty: 30 TABLET | Refills: 1 | Status: SHIPPED | OUTPATIENT
Start: 2020-12-24 | End: 2020-12-28 | Stop reason: SDUPTHER

## 2020-12-24 RX ORDER — GEMFIBROZIL 600 MG/1
TABLET, FILM COATED ORAL
Qty: 60 TABLET | Refills: 1 | Status: SHIPPED | OUTPATIENT
Start: 2020-12-24 | End: 2020-12-28 | Stop reason: SDUPTHER

## 2020-12-28 ENCOUNTER — OFFICE VISIT (OUTPATIENT)
Dept: FAMILY MEDICINE CLINIC | Age: 50
End: 2020-12-28
Payer: COMMERCIAL

## 2020-12-28 VITALS
DIASTOLIC BLOOD PRESSURE: 64 MMHG | WEIGHT: 216 LBS | BODY MASS INDEX: 31.99 KG/M2 | RESPIRATION RATE: 18 BRPM | HEIGHT: 69 IN | OXYGEN SATURATION: 96 % | TEMPERATURE: 97.2 F | SYSTOLIC BLOOD PRESSURE: 118 MMHG | HEART RATE: 81 BPM

## 2020-12-28 LAB — HBA1C MFR BLD: 6.5 %

## 2020-12-28 PROCEDURE — 83036 HEMOGLOBIN GLYCOSYLATED A1C: CPT | Performed by: INTERNAL MEDICINE

## 2020-12-28 PROCEDURE — 99213 OFFICE O/P EST LOW 20 MIN: CPT | Performed by: INTERNAL MEDICINE

## 2020-12-28 RX ORDER — LISINOPRIL AND HYDROCHLOROTHIAZIDE 12.5; 1 MG/1; MG/1
TABLET ORAL
Qty: 90 TABLET | Refills: 5 | Status: SHIPPED | OUTPATIENT
Start: 2020-12-28 | End: 2022-04-19 | Stop reason: SDUPTHER

## 2020-12-28 RX ORDER — ASPIRIN 81 MG/1
81 TABLET ORAL DAILY
Qty: 90 TABLET | Refills: 3 | Status: SHIPPED | OUTPATIENT
Start: 2020-12-28 | End: 2021-09-30 | Stop reason: SDUPTHER

## 2020-12-28 RX ORDER — SPIRONOLACTONE 25 MG/1
TABLET ORAL
Qty: 90 TABLET | Refills: 5 | Status: SHIPPED | OUTPATIENT
Start: 2020-12-28 | End: 2022-04-19 | Stop reason: SDUPTHER

## 2020-12-28 RX ORDER — GEMFIBROZIL 600 MG/1
TABLET, FILM COATED ORAL
Qty: 180 TABLET | Refills: 5 | Status: SHIPPED | OUTPATIENT
Start: 2020-12-28 | End: 2022-04-19 | Stop reason: SDUPTHER

## 2020-12-28 RX ORDER — LEVOTHYROXINE SODIUM 88 UG/1
TABLET ORAL
Qty: 90 TABLET | Refills: 5 | Status: SHIPPED | OUTPATIENT
Start: 2020-12-28 | End: 2022-04-19 | Stop reason: SDUPTHER

## 2020-12-28 RX ORDER — INSULIN GLARGINE 100 [IU]/ML
50 INJECTION, SOLUTION SUBCUTANEOUS 2 TIMES DAILY
Qty: 15 PEN | Refills: 5 | Status: SHIPPED | OUTPATIENT
Start: 2020-12-28 | End: 2021-12-10 | Stop reason: SDUPTHER

## 2020-12-28 RX ORDER — ATORVASTATIN CALCIUM 20 MG/1
TABLET, FILM COATED ORAL
Qty: 90 TABLET | Refills: 5 | Status: SHIPPED | OUTPATIENT
Start: 2020-12-28 | End: 2022-04-19 | Stop reason: SDUPTHER

## 2020-12-28 ASSESSMENT — ENCOUNTER SYMPTOMS
CONSTIPATION: 0
RECTAL PAIN: 0
ABDOMINAL PAIN: 0
CHEST TIGHTNESS: 0
BLOOD IN STOOL: 0
CHOKING: 0
COUGH: 0
VOMITING: 0
NAUSEA: 0
DIARRHEA: 0
ANAL BLEEDING: 0

## 2020-12-28 NOTE — PROGRESS NOTES
[facility]  [address] [address]  Dept: [phone]  Dept Fax: [phone]    Klarissa Paredes a 53 y.o. female who presents today for her medical conditions/complaints as notedbelow. Lavdina Sauce is c/o of   Chief Complaint   Patient presents with    Diabetes         HPI:     Here for DM f/u  Still following with DM at [facility] monthly   Has f/u in 2 weeks   On rybelsus 14 mg now daily   Has adjusted diet quite a bit  Is down 40 lbs overall from 6 months ago   Feeling very well overall   No humalog or rare use  Still does lantus 50 units BID  Needs most of her meds 90 days to [facility]    Needs lipid panel rechecked as has been on fish oil for 6 months now   No cardiac sxs  recivied covid vaccine    Still need referral to dr. Lorna Shields due to hernia she would not like repaired   No acute or obstruction sxs in relation to this       Diabetes  She presents for her follow-up diabetic visit. She has type 2 diabetes mellitus. Her disease course has been improving. There are no hypoglycemic associated symptoms. Pertinent negatives for hypoglycemia include no headaches. There are no diabetic associated symptoms. Pertinent negatives for diabetes include no chest pain and no fatigue. There are no hypoglycemic complications. Risk factors for coronary artery disease include diabetes mellitus, dyslipidemia, family history, obesity, sedentary lifestyle and stress. Current diabetic treatment includes insulin injections, intensive insulin program and oral agent (dual therapy). She is compliant with treatment all of the time. Her weight is decreasing steadily. She is following a generally healthy diet. Meal planning includes avoidance of concentrated sweets. She has not had a previous visit with a dietitian. She participates in exercise weekly. Her breakfast blood glucose range is generally 110-130 mg/dl.  Her lunch blood glucose range is generally 130-140 mg/dl. An ACE inhibitor/angiotensin II receptor blocker is being taken. She does not see a podiatrist.      Hemoglobin A1C (%)   Date Value   2020 8.9   2020 11.9 (H)   2019 11.1         ( goal A1C is < 7)   Microalb/Crt. Ratio (mcg/mg creat)   Date Value   2020 96 (H)     LDL Cholesterol (mg/dL)   Date Value   2020            (goal LDL is <100)   AST (U/L)   Date Value   2020 20     ALT (U/L)   Date Value   2020 24     BUN (mg/dL)   Date Value   2020 13     BP Readings from Last 3 Encounters:   20 118/64   20 106/72   11/10/20 101/70          (goal 120/80)    No past medical history on file.    Past Surgical History:   Procedure Laterality Date     SECTION       SECTION      ENDOMETRIAL ABLATION      TUBAL LIGATION      UMBILICAL HERNIA REPAIR         Family History   Problem Relation Age of Onset    Ovarian Cancer Mother     Other Father         intracranial hemorrhage     Ovarian Cancer Maternal Grandmother     Stroke Maternal Grandmother     Diabetes Maternal Grandfather     Rheum Arthritis Paternal Grandmother     Diabetes Paternal Grandfather     High Blood Pressure Paternal Grandfather     Colon Cancer Paternal Grandfather     Colon Cancer Brother     Cancer Brother         AML       Social History     Tobacco Use    Smoking status: Never Smoker    Smokeless tobacco: Never Used   Substance Use Topics    Alcohol use: Yes      Current Outpatient Medications   Medication Sig Dispense Refill    lisinopril-hydroCHLOROthiazide (PRINZIDE;ZESTORETIC) 10-12.5 MG per tablet TAKE 1 TABLET BY MOUTH ONE TIME A DAY 90 tablet 5    gemfibrozil (LOPID) 600 MG tablet TAKE 1 TABLET BY MOUTH 2 TIMES A  tablet 5    atorvastatin (LIPITOR) 20 MG tablet TAKE 1 TABLET BY MOUTH ONE TIME A DAY 90 tablet 5    levothyroxine (SYNTHROID) 88 MCG tablet TAKE 1 TABLET BY MOUTH ONE TIME A DAY 90 tablet 5    facility-administered medications for this visit. Allergies   Allergen Reactions    Codeine     Tricor [Fenofibrate]           Health Maintenance   Topic Date Due    Diabetic retinal exam  03/28/1981    Hepatitis B vaccine (2 of 3 - Risk 3-dose series) 03/08/1993    Diabetic microalbuminuria test  06/26/2021    Lipid screen  06/26/2021    Potassium monitoring  06/26/2021    Creatinine monitoring  06/26/2021    A1C test (Diabetic or Prediabetic)  09/24/2021    Diabetic foot exam  09/29/2021    Cervical cancer screen  09/15/2023    DTaP/Tdap/Td vaccine (4 - Td) 02/26/2030    Flu vaccine  Completed    Pneumococcal 0-64 years Vaccine  Completed    Hepatitis A vaccine  Aged Out    Hib vaccine  Aged Out    Meningococcal (ACWY) vaccine  Aged Out    Hepatitis C screen  Discontinued    HIV screen  Discontinued       Subjective:     Review of Systems   Constitutional: Negative for activity change, appetite change, chills, diaphoresis, fatigue, fever and unexpected weight change. Eyes: Negative for visual disturbance. Respiratory: Negative for cough, choking and chest tightness. Cardiovascular: Negative for chest pain, palpitations and leg swelling. Gastrointestinal: Negative for abdominal pain, anal bleeding, blood in stool, constipation, diarrhea, nausea, rectal pain and vomiting. Genitourinary: Negative for difficulty urinating. Musculoskeletal: Negative for arthralgias. Skin: Negative for rash. Allergic/Immunologic: Positive for immunocompromised state. Neurological: Negative for headaches. Psychiatric/Behavioral: Negative for sleep disturbance. Objective:      Physical Exam  Vitals signs and nursing note reviewed. Constitutional:       General: She is not in acute distress. Appearance: She is obese. She is not ill-appearing, toxic-appearing or diaphoretic. HENT:      Head: Normocephalic and atraumatic.       Right Ear: External ear normal.      Left Ear: External ear normal.   Neck:      Musculoskeletal: Normal range of motion and neck supple. Neurological:      General: No focal deficit present. Mental Status: She is alert and oriented to person, place, and time. Psychiatric:         Mood and Affect: Mood normal.         Behavior: Behavior normal.         Thought Content: Thought content normal.         Judgment: Judgment normal.       /64   Pulse 81   Temp 97.2 °F (36.2 °C) (Temporal)   Resp 18   Ht 5' 9\" (1.753 m)   Wt 216 lb (98 kg)   SpO2 96%   BMI 31.90 kg/m²     Assessment:       Diagnosis Orders   1. Type 2 diabetes mellitus without complication, with long-term current use of insulin (HCC)  POCT glycosylated hemoglobin (Hb A1C)    Lipid Panel    Comprehensive Metabolic Panel   2. Umbilical hernia without obstruction and without gangrene  YANDEL Barroso MD, General Surgery, [facility]:       Return in about 6 months (around 6/28/2021), or if symptoms worsen or fail to improve, for routine DM.     Orders Placed This Encounter   Procedures    Lipid Panel     Standing Status:   Future     Standing Expiration Date:   12/28/2021     Order Specific Question:   Is Patient Fasting?/# of Hours     Answer:   yes    Comprehensive Metabolic Panel     Standing Status:   Future     Standing Expiration Date:   12/28/2021    YANDEL Barroso MD, General Surgery, [facility]     Referral Priority:   Routine     Referral Type:   Eval and Treat     Referral Reason:   Specialty Services Required     Referred to Provider:   Celena Sanchez MD     Requested Specialty:   General Surgery     Number of Visits Requested:   1    POCT glycosylated hemoglobin (Hb A1C)     Orders Placed This Encounter   Medications    lisinopril-hydroCHLOROthiazide (PRINZIDE;ZESTORETIC) 10-12.5 MG per tablet     Sig: TAKE 1 TABLET BY MOUTH ONE TIME A DAY     Dispense:  90 tablet     Refill:  5    gemfibrozil (LOPID) 600 MG tablet     Sig: TAKE 1 TABLET BY MOUTH 2 TIMES A DAY     Dispense:  180 tablet     Refill:  5    atorvastatin (LIPITOR) 20 MG tablet     Sig: TAKE 1 TABLET BY MOUTH ONE TIME A DAY     Dispense:  90 tablet     Refill:  5    levothyroxine (SYNTHROID) 88 MCG tablet     Sig: TAKE 1 TABLET BY MOUTH ONE TIME A DAY     Dispense:  90 tablet     Refill:  5    spironolactone (ALDACTONE) 25 MG tablet     Sig: TAKE 1 TABLET BY MOUTH ONE TIME A DAY     Dispense:  90 tablet     Refill:  5    metFORMIN (GLUCOPHAGE) 1000 MG tablet     Sig: TAKE 1 TABLET BY MOUTH 2 TIMES A DAY WITH MEALS     Dispense:  180 tablet     Refill:  5    insulin glargine (LANTUS SOLOSTAR) 100 UNIT/ML injection pen     Sig: Inject 50 Units into the skin 2 times daily     Dispense:  15 pen     Refill:  5    aspirin 81 MG EC tablet     Sig: Take 1 tablet by mouth daily     Dispense:  90 tablet     Refill:  3     Profile only - [facility] DM program enrollee effective 10/1/2020    a1c 6.5 percent   Keep up the great work ! Continue with [facility] DM program   Call with q/c  Refilled all as 90 days   Seek immediate medical attention for any chest pain , severe trouble breathing and/or visual changes. Referral to dr. Lorna Shields for hernia evaluation and possible repair   Keep up the good work as well with diet and exercise   F/u in 6 months for DM check   Call with q/c    Patientgiven educational materials - see patient instructions. Discussed use, benefit,and side effects of prescribed medications. All patient questions answered. Ptvoiced understanding. Reviewed health maintenance. Instructed to continue currentmedications, diet and exercise. Patient agreed with treatment plan. Follow up asdirected.      Electronically signed by Teresa Valdez DO on 12/28/2020 at 8:51 AM

## 2020-12-28 NOTE — PROGRESS NOTES
Visit Information    Have you changed or started any medications since your last visit including any over-the-counter medicines, vitamins, or herbal medicines? no   Are you having any side effects from any of your medications? -  no  Have you stopped taking any of your medications? Is so, why? -  no    Have you seen any other physician or provider since your last visit? No  Have you had any other diagnostic tests since your last visit? No  Have you been seen in the emergency room and/or had an admission to a hospital since we last saw you? No  Have you had your routine dental cleaning in the past 6 months? no    Have you activated your [employer] account? If not, what are your barriers?  Yes     Patient Care Team:  Leonor Cornejo DO as PCP - General (Family Medicine)  Leonor Cornejo DO as PCP - [facility]    Medical History Review  Past Medical, Family, and Social History reviewed and does contribute to the patient presenting condition    Health Maintenance   Topic Date Due    Hepatitis C screen  1970    Diabetic retinal exam  03/28/1981    Hepatitis B vaccine (2 of 3 - Risk 3-dose series) 03/08/1993    Diabetic microalbuminuria test  06/26/2021    Lipid screen  06/26/2021    Potassium monitoring  06/26/2021    Creatinine monitoring  06/26/2021    A1C test (Diabetic or Prediabetic)  09/24/2021    Diabetic foot exam  09/29/2021    Cervical cancer screen  09/15/2023    DTaP/Tdap/Td vaccine (4 - Td) 02/26/2030    Flu vaccine  Completed    Pneumococcal 0-64 years Vaccine  Completed    Hepatitis A vaccine  Aged Out    Hib vaccine  Aged Out    Meningococcal (ACWY) vaccine  Aged Out    HIV screen  Discontinued

## 2021-01-06 ENCOUNTER — HOSPITAL ENCOUNTER (OUTPATIENT)
Dept: PHARMACY | Age: 51
Setting detail: THERAPIES SERIES
Discharge: HOME | End: 2021-01-06
Payer: COMMERCIAL

## 2021-01-06 ENCOUNTER — HOSPITAL ENCOUNTER (OUTPATIENT)
Age: 51
Discharge: HOME | End: 2021-01-06
Payer: COMMERCIAL

## 2021-01-06 VITALS
DIASTOLIC BLOOD PRESSURE: 67 MMHG | HEART RATE: 75 BPM | TEMPERATURE: 97.6 F | SYSTOLIC BLOOD PRESSURE: 103 MMHG | BODY MASS INDEX: 31.91 KG/M2 | WEIGHT: 216.1 LBS

## 2021-01-06 DIAGNOSIS — Z79.4 TYPE 2 DIABETES MELLITUS WITHOUT COMPLICATION, WITH LONG-TERM CURRENT USE OF INSULIN (HCC): ICD-10-CM

## 2021-01-06 DIAGNOSIS — E11.9 TYPE 2 DIABETES MELLITUS WITHOUT COMPLICATION, WITH LONG-TERM CURRENT USE OF INSULIN (HCC): ICD-10-CM

## 2021-01-06 LAB
ALBUMIN SERPL-MCNC: 4.4 G/DL (ref 3.5–5.2)
ALBUMIN/GLOBULIN RATIO: 1.5 (ref 1–2.5)
ALP BLD-CCNC: 75 U/L (ref 35–104)
ALT SERPL-CCNC: 18 U/L (ref 5–33)
ANION GAP SERPL CALCULATED.3IONS-SCNC: 15 MMOL/L (ref 9–17)
AST SERPL-CCNC: 16 U/L
BILIRUB SERPL-MCNC: 0.23 MG/DL (ref 0.3–1.2)
BUN BLDV-MCNC: 14 MG/DL (ref 6–20)
BUN/CREAT BLD: ABNORMAL (ref 9–20)
CALCIUM SERPL-MCNC: 9.8 MG/DL (ref 8.6–10.4)
CHLORIDE BLD-SCNC: 100 MMOL/L (ref 98–107)
CHOLESTEROL/HDL RATIO: 5.5
CHOLESTEROL: 149 MG/DL
CO2: 26 MMOL/L (ref 20–31)
CREAT SERPL-MCNC: 0.59 MG/DL (ref 0.5–0.9)
GFR AFRICAN AMERICAN: >60 ML/MIN
GFR NON-AFRICAN AMERICAN: >60 ML/MIN
GFR SERPL CREATININE-BSD FRML MDRD: ABNORMAL ML/MIN/{1.73_M2}
GFR SERPL CREATININE-BSD FRML MDRD: ABNORMAL ML/MIN/{1.73_M2}
GLUCOSE BLD-MCNC: 93 MG/DL (ref 70–99)
HDLC SERPL-MCNC: 27 MG/DL
LDL CHOLESTEROL: 97 MG/DL (ref 0–130)
POTASSIUM SERPL-SCNC: 3.3 MMOL/L (ref 3.7–5.3)
SODIUM BLD-SCNC: 141 MMOL/L (ref 135–144)
TOTAL PROTEIN: 7.4 G/DL (ref 6.4–8.3)
TRIGL SERPL-MCNC: 123 MG/DL
VLDLC SERPL CALC-MCNC: ABNORMAL MG/DL (ref 1–30)

## 2021-01-06 PROCEDURE — 80053 COMPREHEN METABOLIC PANEL: CPT

## 2021-01-06 PROCEDURE — 36415 COLL VENOUS BLD VENIPUNCTURE: CPT

## 2021-01-06 PROCEDURE — 80061 LIPID PANEL: CPT

## 2021-01-06 PROCEDURE — 99212 OFFICE O/P EST SF 10 MIN: CPT

## 2021-01-06 NOTE — PROGRESS NOTES
Diabetic Medication Management Program  [address]. [address]  Phone: [phone]  Fax: [phone]    NAME: Court Grace RECORD NUMBER:  [id number]  AGE: 53 y.o. GENDER: female  : 1970  EPISODE DATE:  2021     Ms. Mulugeta Houston was referred to [facility] Medication Management Services by Dr. Dorita Bob. Patient acknowledges working in consult agreement with clinical pharmacist and this provider. Goals per referral:   Fasting blood glucose: < 130  Peak postprandial glucose: < 180  A1C: < 7    SUBJECTIVE     Ms. Mulugeta Houston is a 53 y.o. female here for the Diabetes Service for self-management education, medication review including over the counter medications and herbal products, overall wellbeing assessment, transition of care and any needed adjustments with updates and recommendations communicated to the referring physician. Patient Findings:     Current diabetic medications: Lantus, Metformin, Rybelsus  Missed doses no  Medication changes  no  Diet changes  Yes - not making smart choices over the holiday - plans to make changes  Activity changes  no  Emergency Room Visit or Hospitalization no   Reason for visit: no  Acute Illness/new problems  no  Symptoms of hypoglycemia yes - sweating - one time woke from sleep  Symptoms of hyperglycemia no - none  Medication adverse reactions none    OBJECTIVE     PMHx:  No past medical history on file.     Social History:    Social History     Tobacco Use    Smoking status: Never Smoker    Smokeless tobacco: Never Used   Substance Use Topics    Alcohol use: Yes     Pertinent Labs:    Lab Results   Component Value Date    LABA1C 6.5 2020    LABA1C 8.9 2020    LABA1C 11.9 (H) 2020     Lab Results   Component Value Date    HDL 21 (L) 2020     Lab Results   Component Value Date    CREATININE 0.49 (L) 2020    BUN 13 2020     (L) 2020    K 4.0 2020 CL 94 (L) 06/26/2020    CO2 19 (L) 06/26/2020     Lab Results   Component Value Date    ALT 24 06/26/2020     Weight:  Wt Readings from Last 3 Encounters:   01/06/21 216 lb 1.6 oz (98 kg)   12/28/20 216 lb (98 kg)   12/08/20 219 lb 4.8 oz (99.5 kg)     Blood Pressure:  BP Readings from Last 3 Encounters:   01/06/21 103/67   12/28/20 118/64   12/08/20 106/72     Current medications:    Current Outpatient Medications:     lisinopril-hydroCHLOROthiazide (PRINZIDE;ZESTORETIC) 10-12.5 MG per tablet, TAKE 1 TABLET BY MOUTH ONE TIME A DAY, Disp: 90 tablet, Rfl: 5    gemfibrozil (LOPID) 600 MG tablet, TAKE 1 TABLET BY MOUTH 2 TIMES A DAY, Disp: 180 tablet, Rfl: 5    atorvastatin (LIPITOR) 20 MG tablet, TAKE 1 TABLET BY MOUTH ONE TIME A DAY, Disp: 90 tablet, Rfl: 5    levothyroxine (SYNTHROID) 88 MCG tablet, TAKE 1 TABLET BY MOUTH ONE TIME A DAY, Disp: 90 tablet, Rfl: 5    spironolactone (ALDACTONE) 25 MG tablet, TAKE 1 TABLET BY MOUTH ONE TIME A DAY, Disp: 90 tablet, Rfl: 5    metFORMIN (GLUCOPHAGE) 1000 MG tablet, TAKE 1 TABLET BY MOUTH 2 TIMES A DAY WITH MEALS, Disp: 180 tablet, Rfl: 5    insulin glargine (LANTUS SOLOSTAR) 100 UNIT/ML injection pen, Inject 50 Units into the skin 2 times daily, Disp: 15 pen, Rfl: 5    aspirin 81 MG EC tablet, Take 1 tablet by mouth daily, Disp: 90 tablet, Rfl: 3    omeprazole (PRILOSEC) 20 MG delayed release capsule, TAKE 1 CAPSULE BY MOUTH ONE TIME A DAY, Disp: 30 capsule, Rfl: 1    Semaglutide (RYBELSUS) 14 MG TABS, Take 1 tablet by mouth every morning 30 minutes prior to other food, drink, or medication, Disp: 30 tablet, Rfl: 2    Continuous Blood Gluc Transmit (DEXCOM G6 TRANSMITTER) MISC, Change every 3 months as directed for continuous glucose monitoring, Disp: 1 each, Rfl: 3    Continuous Blood Gluc Sensor (DEXCOM G6 SENSOR) MISC, Change every 10 days as directed for continuous glucose monitoring, Disp: 9 each, Rfl: 3   insulin lispro, 1 Unit Dial, 100 UNIT/ML SOPN, Inject 10 Units into the skin 3 times daily (with meals) (Patient not taking: Reported on 12/28/2020), Disp: 15 pen, Rfl: 5    blood glucose test strips (PRODIGY NO CODING BLOOD GLUC) strip, Use to test blood sugar up to 5 times daily, Disp: 500 each, Rfl: 3    Blood Glucose Monitoring Suppl (PRODIGY AUTOCODE BLOOD GLUCOSE) w/Device KIT, Use as directed., Disp: 1 kit, Rfl: 0    Insulin Pen Needle (PEN NEEDLES 31GX5/16\") 31G X 8 MM MISC, Use to inject insulin 5 times per day, Disp: 500 each, Rfl: 3    Prodigy Lancets 28G MISC, Use to test blood sugar up to 5 times daily, Disp: 500 each, Rfl: 3    Multiple Vitamins-Minerals (THERAPEUTIC MULTIVITAMIN-MINERALS) tablet, Take 1 tablet by mouth daily, Disp: , Rfl:     Probiotic Product (PROBIOTIC PO), Take 1 capsule by mouth daily, Disp: , Rfl:     vitamin D (CHOLECALCIFEROL) 25 MCG (1000 UT) TABS tablet, Take 1,000 Units by mouth daily, Disp: , Rfl:     vitamin B-12 (CYANOCOBALAMIN) 500 MCG tablet, Take 500 mcg by mouth daily, Disp: , Rfl:     atorvastatin (LIPITOR) 40 MG tablet, Take 1 tablet by mouth daily, Disp: 90 tablet, Rfl: 5    Immunizations:   Most Recent Immunizations   Administered Date(s) Administered    DT (pediatric) 01/01/1998    Hepatitis B 02/08/1993    Influenza A ([id number]) Vaccine PF IM 10/28/2009    Influenza Virus Vaccine 10/14/2020    Influenza, MDCK Quadv, IM, PF (Flucelvax 4 yrs and older) 01/08/2020    Influenza, Quadv, IM, PF (6 mo and older Fluzone, Flulaval, Fluarix, and 3 yrs and older Afluria) 02/01/2019    Pneumococcal Polysaccharide ([id number]) 11/20/2015    Tdap (Boostrix, Adacel) 02/26/2020       Home Blood Glucose Results:                 Blood glucose trends noted:       ASSESSMENT     Recent A1c: 6. 5!!      Lab Results   Component Value Date    LABA1C 6.5 12/28/2020    LABA1C 8.9 09/24/2020    LABA1C 11.9 (H) 06/26/2020 Eating patterns: Continues to eat low carb         Blood Glucose Testing: Patient is currently using CGM     Diabetic Regimen/Compliance: Compliant     Other Medication Compliance: Compliant      PLAN           Physician Follow-up:  As scheduled    Medication Management Follow-up:   Diabetes Service  6 weeks    Electronically signed by Imelda Roach, [address] [place] [address] on 1/6/2021 at 7:49 AM    CLINICAL PHARMACY CONSULT: MED RECONCILIATION/REVIEW Bernardo  22. Tracking Only    PHSO: Yes  Total # of Interventions Recommended: 0  Total Interventions Accepted: 0  Time Spent (min): Brooke Grace, PharmD

## 2021-01-12 ENCOUNTER — TELEPHONE (OUTPATIENT)
Dept: PHARMACY | Facility: CLINIC | Age: 51
End: 2021-01-12

## 2021-01-12 NOTE — TELEPHONE ENCOUNTER
Called patient to schedule pharmacist appointment to discuss medications for Diabetes Management Program.     Scheduled 1/21 7:30AM    Germaine Pang CP.   Pharmacy [facility]  Department, toll free: [phone], option 7

## 2021-01-20 NOTE — TELEPHONE ENCOUNTER
Beck Flanagan REVIEW - BE WELL WITH DIABETES  =================================================================  Renée Harp is a 53 y.o. female enrolled in the [address] [place], [place] [address] Employee Diabetes Program. Patient provided Freddy Mccain with verbal consent to remain in the program for this year. Patient enrolled 10/1/20.     Medications:  Current Outpatient Medications   Medication Sig Dispense Refill    lisinopril-hydroCHLOROthiazide (PRINZIDE;ZESTORETIC) 10-12.5 MG per tablet TAKE 1 TABLET BY MOUTH ONE TIME A DAY 90 tablet 5    gemfibrozil (LOPID) 600 MG tablet TAKE 1 TABLET BY MOUTH 2 TIMES A  tablet 5    atorvastatin (LIPITOR) 20 MG tablet    *confirms taking 20mg daily   TAKE 1 TABLET BY MOUTH ONE TIME A DAY 90 tablet 5    levothyroxine (SYNTHROID) 88 MCG tablet TAKE 1 TABLET BY MOUTH ONE TIME A DAY 90 tablet 5    spironolactone (ALDACTONE) 25 MG tablet TAKE 1 TABLET BY MOUTH ONE TIME A DAY 90 tablet 5    metFORMIN (GLUCOPHAGE) 1000 MG tablet TAKE 1 TABLET BY MOUTH 2 TIMES A DAY WITH MEALS 180 tablet 5    insulin glargine (LANTUS SOLOSTAR) 100 UNIT/ML injection pen Inject 50 Units into the skin 2 times daily 15 pen 5    aspirin 81 MG EC tablet Take 1 tablet by mouth daily 90 tablet 3    omeprazole (PRILOSEC) 20 MG delayed release capsule TAKE 1 CAPSULE BY MOUTH ONE TIME A DAY 30 capsule 1    Semaglutide (RYBELSUS) 14 MG TABS Take 1 tablet by mouth every morning 30 minutes prior to other food, drink, or medication 30 tablet 2    Continuous Blood Gluc Transmit (DEXCOM G6 TRANSMITTER) MISC Change every 3 months as directed for continuous glucose monitoring 1 each 3    Continuous Blood Gluc Sensor (DEXCOM G6 SENSOR) MISC Change every 10 days as directed for continuous glucose monitoring 9 each 3    insulin lispro, 1 Unit Dial, 100 UNIT/ML SOPN    *\"not really needing it now\" - last @2 weeks ago Inject 10 Units into the skin 3 times daily (with meals) (Patient not taking: Reported on 12/28/2020) 15 pen 5    blood glucose test strips (PRODIGY NO CODING BLOOD GLUC) strip Use to test blood sugar up to 5 times daily 500 each 3    Blood Glucose Monitoring Suppl (PRODIGY AUTOCODE BLOOD GLUCOSE) w/Device KIT Use as directed. 1 kit 0    Insulin Pen Needle (PEN NEEDLES 31GX5/16\") 31G X 8 MM MISC    *sts sufficient size and qty; getting Leader / $0   Use to inject insulin 5 times per day 500 each 3    Prodigy Lancets 28G MISC Use to test blood sugar up to 5 times daily 500 each 3    Multiple Vitamins-Minerals (THERAPEUTIC MULTIVITAMIN-MINERALS) tablet Take 1 tablet by mouth daily      Probiotic Product (PROBIOTIC PO) Take 1 capsule by mouth daily      vitamin D (CHOLECALCIFEROL) 25 MCG (1000 UT) TABS tablet Take 1,000 Units by mouth daily      vitamin B-12 (CYANOCOBALAMIN) 500 MCG tablet Take 500 mcg by mouth daily      atorvastatin (LIPITOR) 40 MG tablet    *has been taking 20mg daily    Take 1 tablet by mouth daily 90 tablet 5   *Other: Vascepa (looks like fell off list) - added    Current Pharmacy: [facility] locally    Allergies:   Allergies   Allergen Reactions    Codeine     Tricor [Fenofibrate]       Vitals/Labs:  BP Readings from Last 3 Encounters:   01/06/21 103/67   12/28/20 118/64   12/08/20 106/72     Lab Results   Component Value Date    LABMICR 96 (H) 06/26/2020     Lab Results   Component Value Date    LABA1C 6.5 12/28/2020    LABA1C 8.9 09/24/2020    LABA1C 11.9 (H) 06/26/2020     Lab Results   Component Value Date    CHOL 149 01/06/2021    TRIG 123 01/06/2021    HDL 27 (L) 01/06/2021    LDLCHOLESTEROL 97 01/06/2021     ALT   Date Value Ref Range Status   01/06/2021 18 5 - 33 U/L Final     AST   Date Value Ref Range Status   01/06/2021 16 <32 U/L Final     The 10-year ASCVD risk score (Carla Culp., et al., 2013) is: 2.4%    Values used to calculate the score:      Age: 53 years      Sex: Female      Is Non- Pharmacy:  - aspirin (with prescription), atorvastatin, gemfibrozil, Humalog, Lantus, pen needles, levothyroxine, lisinopril-HCTZ, metformin, Rybelsus  - Prodigy and Dexcom G6     Diabetes Care:   - Glycemic Goal: <7.0% and directed by provider. Follows regularly with medication management clinic at [facility] and now at goal - prescribed Lantus, Humalog (which she reports very infrequent use of now), metformin 1000mg BID and Rybelsus 14mg daily.  - Medication compliance: compliant most of the time  - Lifestyle: has lost 53 pounds and improved her food choices! Other Considerations:  - Blood Pressure Goal: BP less than 140/90 mmHg due to history of DM: Is at blood pressure goal.   - Lipids: currently taking moderate-intensity statin (atorvastatin 20mg daily), along with gemfibrozil 600mg BID and Vascepa 2g BID. Had been prescribed/increased to atorvastatin 40mg daily, however, unclear why/when changed back to atorvastatin 20mg daily - patient denies ADRs - may have been miscommunication with which strength to refill? Atorvastatin 20mg daily reordered most recently and what patient had been taking regularly in the at least @3months prior to most recent lipid panel. Hx of significantly elevated triglycerides.   - Adherent to ACEi/ARB and/or statin: appears adherent per [employer] fill hx to atorvastatin and lisinopril-HCTZ  - Smoking status: Never smoked  - Other: already filling gemfibrozil and levothyroxine (both now eligible for copay waiver) through [facility]; Rybelsus also now eligible for copay waiver, however 0 refills remain and patient had been filling at local pharmacy    PLAN:  - Consideration(s):   · Continue to assess atorvastatin 20mg daily + gemfibrozil 600mg BID + Vascepa 2g BID  · Noted interaction between atorvastatin and gemfibrozil with concern for increased ADRs - patient denies any ADR (prev all/intol to fenofibrate)  · Briefly discussed with patient who prefers no changes at this time but does also reiterate the changes to her diet - potential future consideration if TGs more diet-controlled, to potentially trial off gemfibrozil and/or Vascepa (could maximize atorvastatin to 40mg or 80mg daily also)  · Refill request: Rybelsus 90-ds to [facility] - see refill encounter to PCP  - DM program gaps identified:   · Initial requirements: Office Visit with provider for DM (1st) and A1c (1st)   · Ongoing requirements: Office visit with provider for DM (2nd), ACC/diabetes educator visit (if A1c over 8%), A1c (2nd), Urine microalbumin, Influenza vaccination for [id number] and Medication adherence over 70%   - Education to patient:   · Review of Be Well With Diabetes program (was newly enrolled 10/1/20)   · Overview of [facility] kenisha - reviewed phone # and email address on file in pharmacy system  · Reviewed Dexcom G6 available as rx and as medical benefit  · Provided [facility] phone #: [phone] - if wishing to compare cost through medical benefit  · Discussed that will exceed $600 copay waiver amount    Jame Finney, PharmD, [address] [facility]  Direct: [phone]  Department, toll free: [phone], option 7

## 2021-01-21 ENCOUNTER — SCHEDULED TELEPHONE ENCOUNTER (OUTPATIENT)
Dept: PHARMACY | Facility: CLINIC | Age: 51
End: 2021-01-21

## 2021-01-21 RX ORDER — ICOSAPENT ETHYL 1000 MG/1
2 CAPSULE ORAL 2 TIMES DAILY
COMMUNITY
End: 2021-06-15 | Stop reason: SDUPTHER

## 2021-01-22 RX ORDER — ORAL SEMAGLUTIDE 14 MG/1
TABLET ORAL
Qty: 90 TABLET | Refills: 1 | Status: SHIPPED | OUTPATIENT
Start: 2021-01-22 | End: 2021-07-14 | Stop reason: SDUPTHER

## 2021-01-22 NOTE — TELEPHONE ENCOUNTER
Approved per consult agreement. Patient has titrated up from 3mg to 7mg to 14mg dose and is tolerating well overall with some nausea.

## 2021-01-22 NOTE — TELEPHONE ENCOUNTER
Noted Rybelsus updated to 90-ds in other encounter, thank you!    =======================================================   For Pharmacy Admin Tracking Only    PHSO: Yes  Total # of Interventions Recommended: 2  - Refills Provided #: 1  Recommended intervention potential cost savings: 1  Total Interventions Accepted: 1  Time Spent (min): 30

## 2021-02-13 RX ORDER — OMEPRAZOLE 20 MG/1
CAPSULE, DELAYED RELEASE ORAL
Qty: 30 CAPSULE | Refills: 1 | Status: SHIPPED | OUTPATIENT
Start: 2021-02-13 | End: 2021-04-04

## 2021-02-13 NOTE — TELEPHONE ENCOUNTER
Reason for Disposition   [1] COVID-19 infection suspected by caller or triager AND [2] mild symptoms (cough, fever, or others) AND [1] no complications or SOB    Answer Assessment - Initial Assessment Questions  1. COVID-19 DIAGNOSIS: \"Who made your Coronavirus (COVID-19) diagnosis? \" \"Was it confirmed by a positive lab test?\" If not diagnosed by a HCP, ask \"Are there lots of cases (community spread) where you live? \" (See public health department website, if unsure)      Not tested    2. ONSET: \"When did the COVID-19 symptoms start? \"       Yesterday    3. WORST SYMPTOM: \"What is your worst symptom? \" (e.g., cough, fever, shortness of breath, muscle aches)      Fatigue    4. COUGH: \"Do you have a cough? \" If so, ask: \"How bad is the cough? \"        Denies    5. FEVER: \"Do you have a fever? \" If so, ask: \"What is your temperature, how was it measured, and when did it start? \"      Denies    6. RESPIRATORY STATUS: \"Describe your breathing? \" (e.g., shortness of breath, wheezing, unable to speak)       denies    7. BETTER-SAME-WORSE: Xavier Ziegler you getting better, staying the same or getting worse compared to yesterday? \"  If getting worse, ask, \"In what way? \"      Better    8. HIGH RISK DISEASE: \"Do you have any chronic medical problems? \" (e.g., asthma, heart or lung disease, weak immune system, etc.)      Diabetic    9. PREGNANCY: \"Is there any chance you are pregnant? \" \"When was your last menstrual period? \"        10. OTHER SYMPTOMS: \"Do you have any other symptoms? \"  (e.g., chills, fatigue, headache, loss of smell or taste, muscle pain, sore throat)       Fatigue,vomiting, nausea    Protocols used: CORONAVIRUS (COVID-19) DIAGNOSED OR SUSPECTED-ADULT-    Triage as above    Care advice given and pt verbalized understanding    Please do not respond to writer for this episode. DISPLAY PLAN FREE TEXT

## 2021-02-17 ENCOUNTER — HOSPITAL ENCOUNTER (OUTPATIENT)
Dept: PHARMACY | Age: 51
Setting detail: THERAPIES SERIES
Discharge: HOME | End: 2021-02-17
Payer: COMMERCIAL

## 2021-02-17 VITALS
HEART RATE: 76 BPM | DIASTOLIC BLOOD PRESSURE: 64 MMHG | SYSTOLIC BLOOD PRESSURE: 108 MMHG | WEIGHT: 215.6 LBS | TEMPERATURE: 97.1 F | BODY MASS INDEX: 31.84 KG/M2 | OXYGEN SATURATION: 97 %

## 2021-02-17 PROCEDURE — 99212 OFFICE O/P EST SF 10 MIN: CPT

## 2021-02-17 NOTE — PROGRESS NOTES
Diabetic Medication Management Program  [address]. [facility], [address] [facility]  Phone: [phone]  Fax: [phone]    NAME: Candelaria Joya RECORD NUMBER:  [id number]  AGE: 53 y.o. GENDER: female  : 1970  EPISODE DATE:  2021     Ms. Jony Kong was referred to [facility] by Dr. Aliyah Sahni. Patient acknowledges working in consult agreement with clinical pharmacist and this provider. Goals per referral:   Fasting blood glucose: < 130   Peak postprandial glucose: < 180  A1C: < 7    SUBJECTIVE     Ms. Jony Kong is a 53 y.o. female here for the Diabetes Service for self-management education, medication review including over the counter medications and herbal products, overall wellbeing assessment, transition of care and any needed adjustments with updates and recommendations communicated to the referring physician. Patient Findings:   Current diabetic medications: see med list  Missed doses no  Medication changes  no  Diet changes  Yes - still eating healthy diabetic diet overall - some cheats with Snickers icecream bars and chips  Activity changes  No - was doing well when weather was nicer - has not been as scheduled with exercise as of recently  Emergency Room Visit or Hospitalization no   Reason for visit: no  Acute Illness/new problems  Concern for gastroperesis - patient sometimes has episode of projectile vomiting that is not attributed to any specific food intake or timing of meal.  Patient states she vomits food from the entire day that does not seem to be digesting. She does have a known large hernia which is to be fixed but not scheduled as of yet. Symptoms of hypoglycemia no - none  Symptoms of hyperglycemia no - none  Medication adverse reactions none    OBJECTIVE     PMHx:  No past medical history on file.     Social History:    Social History     Tobacco Use    Smoking status: Never Smoker  Smokeless tobacco: Never Used   Substance Use Topics    Alcohol use: Yes     Pertinent Labs:    Lab Results   Component Value Date    LABA1C 6.5 12/28/2020    LABA1C 8.9 09/24/2020    LABA1C 11.9 (H) 06/26/2020     Lab Results   Component Value Date    CHOL 149 01/06/2021    TRIG 123 01/06/2021    HDL 27 (L) 01/06/2021     Lab Results   Component Value Date    CREATININE 0.59 01/06/2021    BUN 14 01/06/2021     01/06/2021    K 3.3 (L) 01/06/2021     01/06/2021    CO2 26 01/06/2021     Lab Results   Component Value Date    ALT 18 01/06/2021     Weight:  Wt Readings from Last 3 Encounters:   02/17/21 215 lb 9.6 oz (97.8 kg)   01/06/21 216 lb 1.6 oz (98 kg)   12/28/20 216 lb (98 kg)     Blood Pressure:  BP Readings from Last 3 Encounters:   02/17/21 108/64   01/06/21 103/67   12/28/20 118/64     Current medications:    Current Outpatient Medications:     omeprazole (PRILOSEC) 20 MG delayed release capsule, TAKE 1 CAPSULE BY MOUTH ONE TIME A DAY, Disp: 30 capsule, Rfl: 1    Semaglutide (RYBELSUS) 14 MG TABS, Take 1 tablet by mouth every morning 30 minutes prior to other food, drink, or medication, Disp: 90 tablet, Rfl: 1    Icosapent Ethyl (VASCEPA) 1 g CAPS capsule, Take 2 capsules by mouth 2 times daily, Disp: , Rfl:     lisinopril-hydroCHLOROthiazide (PRINZIDE;ZESTORETIC) 10-12.5 MG per tablet, TAKE 1 TABLET BY MOUTH ONE TIME A DAY, Disp: 90 tablet, Rfl: 5    gemfibrozil (LOPID) 600 MG tablet, TAKE 1 TABLET BY MOUTH 2 TIMES A DAY, Disp: 180 tablet, Rfl: 5    atorvastatin (LIPITOR) 20 MG tablet, TAKE 1 TABLET BY MOUTH ONE TIME A DAY, Disp: 90 tablet, Rfl: 5    levothyroxine (SYNTHROID) 88 MCG tablet, TAKE 1 TABLET BY MOUTH ONE TIME A DAY, Disp: 90 tablet, Rfl: 5    spironolactone (ALDACTONE) 25 MG tablet, TAKE 1 TABLET BY MOUTH ONE TIME A DAY, Disp: 90 tablet, Rfl: 5    metFORMIN (GLUCOPHAGE) 1000 MG tablet, TAKE 1 TABLET BY MOUTH 2 TIMES A DAY WITH MEALS, Disp: 180 tablet, Rfl: 5   insulin glargine (LANTUS SOLOSTAR) 100 UNIT/ML injection pen, Inject 50 Units into the skin 2 times daily, Disp: 15 pen, Rfl: 5    aspirin 81 MG EC tablet, Take 1 tablet by mouth daily, Disp: 90 tablet, Rfl: 3    Continuous Blood Gluc Transmit (DEXCOM G6 TRANSMITTER) MISC, Change every 3 months as directed for continuous glucose monitoring, Disp: 1 each, Rfl: 3    Continuous Blood Gluc Sensor (DEXCOM G6 SENSOR) MISC, Change every 10 days as directed for continuous glucose monitoring, Disp: 9 each, Rfl: 3    insulin lispro, 1 Unit Dial, 100 UNIT/ML SOPN, Inject 10 Units into the skin 3 times daily (with meals) (Patient not taking: Reported on 1/21/2021), Disp: 15 pen, Rfl: 5    blood glucose test strips (PRODIGY NO CODING BLOOD GLUC) strip, Use to test blood sugar up to 5 times daily, Disp: 500 each, Rfl: 3    Blood Glucose Monitoring Suppl (PRODIGY AUTOCODE BLOOD GLUCOSE) w/Device KIT, Use as directed., Disp: 1 kit, Rfl: 0    Insulin Pen Needle (PEN NEEDLES 31GX5/16\") 31G X 8 MM MISC, Use to inject insulin 5 times per day, Disp: 500 each, Rfl: 3    Prodigy Lancets 28G MISC, Use to test blood sugar up to 5 times daily, Disp: 500 each, Rfl: 3    Multiple Vitamins-Minerals (THERAPEUTIC MULTIVITAMIN-MINERALS) tablet, Take 1 tablet by mouth daily, Disp: , Rfl:     Probiotic Product (PROBIOTIC PO), Take 1 capsule by mouth daily, Disp: , Rfl:     vitamin D (CHOLECALCIFEROL) 25 MCG (1000 UT) TABS tablet, Take 1,000 Units by mouth daily, Disp: , Rfl:     vitamin B-12 (CYANOCOBALAMIN) 500 MCG tablet, Take 500 mcg by mouth daily, Disp: , Rfl:     Immunizations:   Most Recent Immunizations   Administered Date(s) Administered    DT (pediatric) 01/01/1998    Hepatitis B 02/08/1993    Influenza A ([id number]) Vaccine PF IM 10/28/2009    Influenza Virus Vaccine 10/14/2020    Influenza, MDCK Quadv, IM, PF (Flucelvax 4 yrs and older) 01/08/2020  Influenza, Quadv, IM, PF (6 mo and older Fluzone, Flulaval, Fluarix, and 3 yrs and older Afluria) 02/01/2019    Pneumococcal Polysaccharide ([id number]) 11/20/2015    Tdap (Boostrix, Adacel) 02/26/2020     Home Blood Glucose Results:             Blood glucose trends noted:       ASSESSMENT     Recent A1c: Next due 3/28/21    Lab Results   Component Value Date    LABA1C 6.5 12/28/2020    LABA1C 8.9 09/24/2020    LABA1C 11.9 (H) 06/26/2020        Eating patterns: See charting below           Blood Glucose Testing: Patient is currently using CGM - Dexcom     Diabetic Regimen/Compliance: Compliant with regimen     Other Medication Compliance: Compliant with regimen      PLAN           Physician Follow-up:  As scheduled    Medication Management Follow-up:   Diabetes Service  6 weeks    Electronically signed by Merlinda Poser, [facility] on 2/17/2021 at 7:51 AM    CLINICAL PHARMACY CONSULT: MED RECONCILIATION/REVIEW Bernardo  22. Tracking Only    PHSO: Yes  Total # of Interventions Recommended: 0  - Maintenance Safety Lab Monitoring #: 0  Total Interventions Accepted: 0  Time Spent (min): 181 Lata Tyrone, PharmD

## 2021-04-04 RX ORDER — OMEPRAZOLE 20 MG/1
CAPSULE, DELAYED RELEASE ORAL
Qty: 30 CAPSULE | Refills: 1 | Status: SHIPPED | OUTPATIENT
Start: 2021-04-04 | End: 2021-07-14 | Stop reason: SDUPTHER

## 2021-04-07 ENCOUNTER — TELEPHONE (OUTPATIENT)
Dept: PHARMACY | Age: 51
End: 2021-04-07

## 2021-04-07 NOTE — TELEPHONE ENCOUNTER
Patient LM stating she is being testing for COVID today and will call to reschedule Diabetes follow up appt.

## 2021-04-13 ENCOUNTER — TELEPHONE (OUTPATIENT)
Dept: FAMILY MEDICINE CLINIC | Age: 51
End: 2021-04-13

## 2021-04-13 DIAGNOSIS — U07.1 COVID-19: Primary | ICD-10-CM

## 2021-04-14 ENCOUNTER — NURSE ONLY (OUTPATIENT)
Dept: PRIMARY CARE CLINIC | Age: 51
End: 2021-04-14

## 2021-04-14 DIAGNOSIS — R05.9 COUGH: Primary | ICD-10-CM

## 2021-04-16 ENCOUNTER — TELEMEDICINE (OUTPATIENT)
Dept: FAMILY MEDICINE CLINIC | Age: 51
End: 2021-04-16
Payer: COMMERCIAL

## 2021-04-16 DIAGNOSIS — U07.1 COVID-19: Primary | ICD-10-CM

## 2021-04-16 PROCEDURE — 99213 OFFICE O/P EST LOW 20 MIN: CPT | Performed by: INTERNAL MEDICINE

## 2021-04-16 ASSESSMENT — ENCOUNTER SYMPTOMS
CHEST TIGHTNESS: 0
HEMOPTYSIS: 0
STRIDOR: 0
SHORTNESS OF BREATH: 0
FACIAL SWELLING: 0
RHINORRHEA: 0
SINUS PAIN: 0
HEARTBURN: 0
CHOKING: 0
COUGH: 1
SORE THROAT: 0
DIARRHEA: 0
CONSTIPATION: 0
SINUS PRESSURE: 0
WHEEZING: 0
ABDOMINAL PAIN: 0

## 2021-04-16 NOTE — PROGRESS NOTES
Visit Information    Have you changed or started any medications since your last visit including any over-the-counter medicines, vitamins, or herbal medicines? no   Are you having any side effects from any of your medications? -  no  Have you stopped taking any of your medications? Is so, why? -  no    Have you seen any other physician or provider since your last visit? No  Have you had any other diagnostic tests since your last visit? No  Have you been seen in the emergency room and/or had an admission to a hospital since we last saw you? No  Have you had your routine dental cleaning in the past 6 months? no    Have you activated your [employer] account? If not, what are your barriers?  Yes     Patient Care Team:  Earl Snyder DO as PCP - General (Family Medicine)  Earl Snyder DO as PCP - [facility] Provider    Medical History Review  Past Medical, Family, and Social History reviewed and does contribute to the patient presenting condition    Health Maintenance   Topic Date Due    Diabetic retinal exam  Never done    Hepatitis B vaccine (2 of 3 - Risk 3-dose series) 03/08/1993    Shingles Vaccine (1 of 2) Never done    Colon cancer screen colonoscopy  03/28/2021    Diabetic microalbuminuria test  06/26/2021    Diabetic foot exam  09/29/2021    A1C test (Diabetic or Prediabetic)  12/28/2021    Lipid screen  01/06/2022    Potassium monitoring  01/06/2022    Creatinine monitoring  01/06/2022    Breast cancer screen  06/26/2022    Cervical cancer screen  09/15/2023    DTaP/Tdap/Td vaccine (4 - Td) 02/26/2030    Flu vaccine  Completed    Pneumococcal 0-64 years Vaccine  Completed    COVID-19 Vaccine  Completed    Hepatitis A vaccine  Aged Out    Hib vaccine  Aged Out    Meningococcal (ACWY) vaccine  Aged Out    Hepatitis C screen  Discontinued    HIV screen  Discontinued

## 2021-04-16 NOTE — PROGRESS NOTES
Jessica Mora (:  1970) is a 49 y.o. female,Established patient, here for evaluation of the following chief complaint(s): Other (dx with covid on 21. symptoms started 21. needs return to work note) and Other (you have paperwork)      ASSESSMENT/PLAN:  1. COVID-19  Paper work filled out   Sent on  and pt can  original. Will mail to provider as wll. Push fluids and rest   Continue otc tx as needed   Call with q/c       No follow-ups on file. 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 The current episode started 1 to 4 weeks ago. The problem has been gradually improving. The problem occurs hourly. The cough is productive of sputum. Pertinent negatives include no chest pain, chills, ear congestion, ear pain, fever, headaches, heartburn, hemoptysis, myalgias, nasal congestion, postnasal drip, rash, rhinorrhea, sore throat, shortness of breath, sweats or wheezing. The symptoms are aggravated by dust, exercise, pollens and fumes. She has tried rest, prescription cough suppressant, OTC inhaler, OTC cough suppressant, cool air and body position changes for the symptoms. The treatment provided moderate relief. Her past medical history is significant for environmental allergies.        Review of Systems   Constitutional: Positive for activity change, appetite change and fatigue. Negative for chills, diaphoresis, fever and unexpected weight change. HENT: Negative for congestion, ear pain, facial swelling, hearing loss, mouth sores, nosebleeds, postnasal drip, rhinorrhea, sinus pressure, sinus pain and sore throat. Eyes: Negative for visual disturbance. Respiratory: Positive for cough. Negative for hemoptysis, choking, chest tightness, shortness of breath, wheezing and stridor. Cardiovascular: Negative for chest pain, palpitations and leg swelling. Gastrointestinal: Negative for abdominal pain, constipation, diarrhea and heartburn. Musculoskeletal: Negative for arthralgias and myalgias. Skin: Negative for rash. Allergic/Immunologic: Positive for environmental allergies, food allergies and immunocompromised state. Neurological: Negative for headaches. Psychiatric/Behavioral: Negative for sleep disturbance.        Patient-Reported Vitals 4/16/2021   Patient-Reported Weight 212   Patient-Reported Height 59   Patient-Reported Pulse 98   Patient-Reported Temperature 97.1   Patient-Reported SpO2 95        Physical Exam    [INSTRUCTIONS:  \"[x]\" Indicates a positive item  \"[]\" Indicates a negative item  -- DELETE ALL ITEMS NOT EXAMINED]    Constitutional: [x] Appears well-developed and well-nourished [x] No apparent distress      [] Abnormal -     Mental status: [x] Alert and awake  [x] Oriented to person/place/time [x] Able to follow commands    [] Abnormal -     Eyes:   EOM    [x]  Normal    [] Abnormal -   Sclera  [x]  Normal    [] Abnormal -          Discharge [x]  None visible   [] Abnormal -     HENT: [x] Normocephalic, atraumatic  [] Abnormal -   [x] Mouth/Throat: Mucous membranes are moist    External Ears [x] Normal  [] Abnormal -    Neck: [x] No visualized mass [] Abnormal -     Pulmonary/Chest: [x] Respiratory effort normal   [x] No visualized signs of difficulty breathing or respiratory distress        [] Abnormal -

## 2021-05-04 ENCOUNTER — HOSPITAL ENCOUNTER (OUTPATIENT)
Dept: PHARMACY | Age: 51
Setting detail: THERAPIES SERIES
Discharge: HOME | End: 2021-05-04
Payer: COMMERCIAL

## 2021-05-04 VITALS
TEMPERATURE: 97.7 F | OXYGEN SATURATION: 97 % | DIASTOLIC BLOOD PRESSURE: 67 MMHG | WEIGHT: 217.7 LBS | BODY MASS INDEX: 32.15 KG/M2 | HEART RATE: 81 BPM | SYSTOLIC BLOOD PRESSURE: 104 MMHG

## 2021-05-04 LAB — HBA1C MFR BLD: 6.4 %

## 2021-05-04 PROCEDURE — 83036 HEMOGLOBIN GLYCOSYLATED A1C: CPT

## 2021-05-04 PROCEDURE — 99212 OFFICE O/P EST SF 10 MIN: CPT

## 2021-05-04 NOTE — PROGRESS NOTES
01/06/2021     Lab Results   Component Value Date    CREATININE 0.59 01/06/2021    BUN 14 01/06/2021     01/06/2021    K 3.3 (L) 01/06/2021     01/06/2021    CO2 26 01/06/2021     Lab Results   Component Value Date    ALT 18 01/06/2021     Weight:  Wt Readings from Last 3 Encounters:   05/04/21 217 lb 11.2 oz (98.7 kg)   02/17/21 215 lb 9.6 oz (97.8 kg)   01/06/21 216 lb 1.6 oz (98 kg)     Blood Pressure:  BP Readings from Last 3 Encounters:   05/04/21 104/67   02/17/21 108/64   01/06/21 103/67     Current medications:    Current Outpatient Medications:     omeprazole (PRILOSEC) 20 MG delayed release capsule, TAKE 1 CAPSULE BY MOUTH ONE TIME A DAY, Disp: 30 capsule, Rfl: 1    Semaglutide (RYBELSUS) 14 MG TABS, Take 1 tablet by mouth every morning 30 minutes prior to other food, drink, or medication, Disp: 90 tablet, Rfl: 1    Icosapent Ethyl (VASCEPA) 1 g CAPS capsule, Take 2 capsules by mouth 2 times daily, Disp: , Rfl:     lisinopril-hydroCHLOROthiazide (PRINZIDE;ZESTORETIC) 10-12.5 MG per tablet, TAKE 1 TABLET BY MOUTH ONE TIME A DAY, Disp: 90 tablet, Rfl: 5    gemfibrozil (LOPID) 600 MG tablet, TAKE 1 TABLET BY MOUTH 2 TIMES A DAY, Disp: 180 tablet, Rfl: 5    atorvastatin (LIPITOR) 20 MG tablet, TAKE 1 TABLET BY MOUTH ONE TIME A DAY, Disp: 90 tablet, Rfl: 5    levothyroxine (SYNTHROID) 88 MCG tablet, TAKE 1 TABLET BY MOUTH ONE TIME A DAY, Disp: 90 tablet, Rfl: 5    spironolactone (ALDACTONE) 25 MG tablet, TAKE 1 TABLET BY MOUTH ONE TIME A DAY, Disp: 90 tablet, Rfl: 5    metFORMIN (GLUCOPHAGE) 1000 MG tablet, TAKE 1 TABLET BY MOUTH 2 TIMES A DAY WITH MEALS, Disp: 180 tablet, Rfl: 5    insulin glargine (LANTUS SOLOSTAR) 100 UNIT/ML injection pen, Inject 50 Units into the skin 2 times daily, Disp: 15 pen, Rfl: 5    aspirin 81 MG EC tablet, Take 1 tablet by mouth daily, Disp: 90 tablet, Rfl: 3    Continuous Blood Gluc Transmit (DEXCOM G6 TRANSMITTER) MISC, Change every 3 months as directed for continuous glucose monitoring, Disp: 1 each, Rfl: 3    Continuous Blood Gluc Sensor (DEXCOM G6 SENSOR) MISC, Change every 10 days as directed for continuous glucose monitoring, Disp: 9 each, Rfl: 3    insulin lispro, 1 Unit Dial, 100 UNIT/ML SOPN, Inject 10 Units into the skin 3 times daily (with meals) (Patient not taking: Reported on 1/21/2021), Disp: 15 pen, Rfl: 5    blood glucose test strips (PRODIGY NO CODING BLOOD GLUC) strip, Use to test blood sugar up to 5 times daily, Disp: 500 each, Rfl: 3    Blood Glucose Monitoring Suppl (PRODIGY AUTOCODE BLOOD GLUCOSE) w/Device KIT, Use as directed., Disp: 1 kit, Rfl: 0    Insulin Pen Needle (PEN NEEDLES 31GX5/16\") 31G X 8 MM MISC, Use to inject insulin 5 times per day, Disp: 500 each, Rfl: 3    Prodigy Lancets 28G MISC, Use to test blood sugar up to 5 times daily, Disp: 500 each, Rfl: 3    Multiple Vitamins-Minerals (THERAPEUTIC MULTIVITAMIN-MINERALS) tablet, Take 1 tablet by mouth daily, Disp: , Rfl:     Probiotic Product (PROBIOTIC PO), Take 1 capsule by mouth daily, Disp: , Rfl:     vitamin D (CHOLECALCIFEROL) 25 MCG (1000 UT) TABS tablet, Take 1,000 Units by mouth daily, Disp: , Rfl:     vitamin B-12 (CYANOCOBALAMIN) 500 MCG tablet, Take 500 mcg by mouth daily, Disp: , Rfl:     Immunizations:   Most Recent Immunizations   Administered Date(s) Administered    DT (pediatric) 01/01/1998    Hepatitis B 02/08/1993    Influenza A (G7H9-42) Vaccine PF IM 10/28/2009    Influenza Virus Vaccine 10/14/2020    Influenza, MDCK Quadv, IM, PF (Flucelvax 4 yrs and older) 01/08/2020    Influenza, Quadv, IM, PF (6 mo and older Fluzone, Flulaval, Fluarix, and 3 yrs and older Afluria) 02/01/2019    Pneumococcal Polysaccharide ([id number]) 11/20/2015    Tdap (Boostrix, Adacel) 02/26/2020     Home Blood Glucose Results:                           Blood glucose trends noted: 82% in range - Elevations identified from dietary choices and also not feeling

## 2021-05-17 ENCOUNTER — TELEPHONE (OUTPATIENT)
Dept: PHARMACY | Facility: CLINIC | Age: 51
End: 2021-05-17

## 2021-05-17 NOTE — TELEPHONE ENCOUNTER
As of 5/7/21 patient has used $634 of the maximum of $600 a year in waived co pays for specific medications and pharmacy-related supplies through the [address] DM Program.    Patient currently enrolled in the DM Program and has used all of the maximum of $600 a year in waived co pays for specific medications and pharmacy-related supplies through the [employer]. Courtesy call placed to patient at home number to advise them of the above information. Spoke to patient and advised her of the above information. Patient verified understanding.         Binu Shanks, [address]   Department, toll free: [phone], option 7

## 2021-06-01 ENCOUNTER — HOSPITAL ENCOUNTER (OUTPATIENT)
Dept: PHARMACY | Age: 51
Setting detail: THERAPIES SERIES
Discharge: HOME | End: 2021-06-01
Payer: COMMERCIAL

## 2021-06-01 VITALS
BODY MASS INDEX: 32.16 KG/M2 | HEART RATE: 79 BPM | WEIGHT: 217.8 LBS | OXYGEN SATURATION: 97 % | TEMPERATURE: 97.3 F | SYSTOLIC BLOOD PRESSURE: 105 MMHG | DIASTOLIC BLOOD PRESSURE: 67 MMHG

## 2021-06-01 PROCEDURE — 99212 OFFICE O/P EST SF 10 MIN: CPT

## 2021-06-01 NOTE — PROGRESS NOTES
Diabetic Medication Management Program  [facility]  [address]. [facility], [address]  Phone: [phone]  Fax: [phone]    NAME: Chris Mejia RECORD NUMBER:  [id number]  AGE: 49 y.o. GENDER: female  : 1970  EPISODE DATE:  2021     Ms. Rosa Dixon was referred to [address] [place] [address] Medication Management Services by Dr. Loly Mejia. Patient will be establishing care with Dr. Guy Orellana - sent referral request.  Patient acknowledges working in consult agreement with clinical pharmacist and this provider. Goals per referral:   Fasting blood glucose: < 130  Peak postprandial glucose: < 180  A1C: < 7    SUBJECTIVE     Ms. Rosa Dixon is a 49 y.o. female here for the Diabetes Service for self-management education, medication review including over the counter medications and herbal products, overall wellbeing assessment, transition of care and any needed adjustments with updates and recommendations communicated to the referring physician. Patient Findings:   Current diabetic medications: See med list  Missed doses no  Medication changes  no  Diet changes  no  Activity changes  yes  Emergency Room Visit or Hospitalization no   Reason for visit: no  Acute Illness/new problems  no  Symptoms of hypoglycemia yes - one day felt generally unwell - night before had extremely elevated BG  Symptoms of hyperglycemia no - none  Medication adverse reactions none     OBJECTIVE     PMHx:  No past medical history on file.     Social History:    Social History     Tobacco Use    Smoking status: Never Smoker    Smokeless tobacco: Never Used   Substance Use Topics    Alcohol use: Yes     Pertinent Labs:    Lab Results   Component Value Date    LABA1C 6.4 2021    LABA1C 6.5 2020    LABA1C 8.9 2020     Lab Results   Component Value Date    CHOL 149 2021    TRIG 123 2021    HDL 27 (L) 2021     Lab Results   Component Value Date    CREATININE 0.59 2021 BUN 14 01/06/2021     01/06/2021    K 3.3 (L) 01/06/2021     01/06/2021    CO2 26 01/06/2021     Lab Results   Component Value Date    ALT 18 01/06/2021     Weight:  Wt Readings from Last 3 Encounters:   06/01/21 217 lb 12.8 oz (98.8 kg)   05/04/21 217 lb 11.2 oz (98.7 kg)   02/17/21 215 lb 9.6 oz (97.8 kg)     Blood Pressure:  BP Readings from Last 3 Encounters:   05/04/21 104/67   02/17/21 108/64   01/06/21 103/67     Current medications:    Current Outpatient Medications:     omeprazole (PRILOSEC) 20 MG delayed release capsule, TAKE 1 CAPSULE BY MOUTH ONE TIME A DAY, Disp: 30 capsule, Rfl: 1    Semaglutide (RYBELSUS) 14 MG TABS, Take 1 tablet by mouth every morning 30 minutes prior to other food, drink, or medication, Disp: 90 tablet, Rfl: 1    Icosapent Ethyl (VASCEPA) 1 g CAPS capsule, Take 2 capsules by mouth 2 times daily, Disp: , Rfl:     lisinopril-hydroCHLOROthiazide (PRINZIDE;ZESTORETIC) 10-12.5 MG per tablet, TAKE 1 TABLET BY MOUTH ONE TIME A DAY, Disp: 90 tablet, Rfl: 5    gemfibrozil (LOPID) 600 MG tablet, TAKE 1 TABLET BY MOUTH 2 TIMES A DAY, Disp: 180 tablet, Rfl: 5    atorvastatin (LIPITOR) 20 MG tablet, TAKE 1 TABLET BY MOUTH ONE TIME A DAY, Disp: 90 tablet, Rfl: 5    levothyroxine (SYNTHROID) 88 MCG tablet, TAKE 1 TABLET BY MOUTH ONE TIME A DAY, Disp: 90 tablet, Rfl: 5    spironolactone (ALDACTONE) 25 MG tablet, TAKE 1 TABLET BY MOUTH ONE TIME A DAY, Disp: 90 tablet, Rfl: 5    metFORMIN (GLUCOPHAGE) 1000 MG tablet, TAKE 1 TABLET BY MOUTH 2 TIMES A DAY WITH MEALS, Disp: 180 tablet, Rfl: 5    insulin glargine (LANTUS SOLOSTAR) 100 UNIT/ML injection pen, Inject 50 Units into the skin 2 times daily, Disp: 15 pen, Rfl: 5    aspirin 81 MG EC tablet, Take 1 tablet by mouth daily, Disp: 90 tablet, Rfl: 3    Continuous Blood Gluc Transmit (DEXCOM G6 TRANSMITTER) MISC, Change every 3 months as directed for continuous glucose monitoring, Disp: 1 each, Rfl: 3    Continuous Blood Gluc Sensor (DEXCOM G6 SENSOR) MISC, Change every 10 days as directed for continuous glucose monitoring, Disp: 9 each, Rfl: 3    insulin lispro, 1 Unit Dial, 100 UNIT/ML SOPN, Inject 10 Units into the skin 3 times daily (with meals) (Patient not taking: Reported on 1/21/2021), Disp: 15 pen, Rfl: 5    blood glucose test strips (PRODIGY NO CODING BLOOD GLUC) strip, Use to test blood sugar up to 5 times daily, Disp: 500 each, Rfl: 3    Blood Glucose Monitoring Suppl (PRODIGY AUTOCODE BLOOD GLUCOSE) w/Device KIT, Use as directed., Disp: 1 kit, Rfl: 0    Insulin Pen Needle (PEN NEEDLES 31GX5/16\") 31G X 8 MM MISC, Use to inject insulin 5 times per day, Disp: 500 each, Rfl: 3    Prodigy Lancets 28G MISC, Use to test blood sugar up to 5 times daily, Disp: 500 each, Rfl: 3    Multiple Vitamins-Minerals (THERAPEUTIC MULTIVITAMIN-MINERALS) tablet, Take 1 tablet by mouth daily, Disp: , Rfl:     Probiotic Product (PROBIOTIC PO), Take 1 capsule by mouth daily, Disp: , Rfl:     vitamin D (CHOLECALCIFEROL) 25 MCG (1000 UT) TABS tablet, Take 1,000 Units by mouth daily, Disp: , Rfl:     vitamin B-12 (CYANOCOBALAMIN) 500 MCG tablet, Take 500 mcg by mouth daily, Disp: , Rfl:     Immunizations:   Most Recent Immunizations   Administered Date(s) Administered    COVID-19, Pfizer, PF, 30mcg/0.3mL 01/08/2021    DT (pediatric) 01/01/1998    Hepatitis B 02/08/1993    Influenza A (Q8W5-97) Vaccine PF IM 10/28/2009    Influenza Virus Vaccine 10/14/2020    Influenza, MDCK Quadv, IM, PF (Flucelvax 4 yrs and older) 01/08/2020    Influenza, Quadv, IM, PF (6 mo and older Fluzone, Flulaval, Fluarix, and 3 yrs and older Afluria) 02/01/2019    Pneumococcal Polysaccharide ([id number]) 11/20/2015    Tdap (Boostrix, Adacel) 02/26/2020     Home Blood Glucose Results:                   Blood glucose trends noted: Avg May 19-Jun 1 = 146 mg/dL      ASSESSMENT     Recent A1c: 6.4    Lab Results   Component Value Date    LABA1C 6.4 05/04/2021 LABA1C 6.5 12/28/2020    LABA1C 8.9 09/24/2020      Eating patterns: Patient has resumed logging dietary intake which keeps her on track. She had icecream sundae and noted BG spiking - took fast acting insulin after - educated to take prior to sundae next time to avoid going low with delayed administration of insulin. Plans to focus more on weight loss moving forward - will log calories for 1 week to establish baseline. Blood Glucose Testing: Patient is currently using CGM     Diabetic Regimen/Compliance: Compliant with regimen     Other Medication Compliance: Compliant with regimen      PLAN          Physician Follow-up:  As scheduled - establishing care with Dr. Swathi Wilson this week.     Medication Management Follow-up:   Diabetes Service  - 5 weeks    Electronically signed by Paula Quijano, [address] [place] [address] on 6/1/2021 at [address] [place] [address] Intervention Detail:    Total # of Interventions Recommended: 0   Total # of Interventions Accepted: 0   Time Spent (min): 60

## 2021-06-03 ENCOUNTER — OFFICE VISIT (OUTPATIENT)
Dept: FAMILY MEDICINE CLINIC | Age: 51
End: 2021-06-03
Payer: COMMERCIAL

## 2021-06-03 VITALS
BODY MASS INDEX: 32.64 KG/M2 | HEART RATE: 74 BPM | WEIGHT: 221 LBS | TEMPERATURE: 96.8 F | DIASTOLIC BLOOD PRESSURE: 70 MMHG | OXYGEN SATURATION: 97 % | SYSTOLIC BLOOD PRESSURE: 101 MMHG

## 2021-06-03 DIAGNOSIS — E11.9 TYPE 2 DIABETES MELLITUS WITHOUT COMPLICATION, WITH LONG-TERM CURRENT USE OF INSULIN (HCC): ICD-10-CM

## 2021-06-03 DIAGNOSIS — Z79.4 TYPE 2 DIABETES MELLITUS WITHOUT COMPLICATION, WITH LONG-TERM CURRENT USE OF INSULIN (HCC): ICD-10-CM

## 2021-06-03 DIAGNOSIS — Z76.89 ENCOUNTER TO ESTABLISH CARE WITH NEW DOCTOR: Primary | ICD-10-CM

## 2021-06-03 PROCEDURE — 99213 OFFICE O/P EST LOW 20 MIN: CPT | Performed by: FAMILY MEDICINE

## 2021-06-03 SDOH — ECONOMIC STABILITY: FOOD INSECURITY: WITHIN THE PAST 12 MONTHS, THE FOOD YOU BOUGHT JUST DIDN'T LAST AND YOU DIDN'T HAVE MONEY TO GET MORE.: NEVER TRUE

## 2021-06-03 SDOH — ECONOMIC STABILITY: FOOD INSECURITY: WITHIN THE PAST 12 MONTHS, YOU WORRIED THAT YOUR FOOD WOULD RUN OUT BEFORE YOU GOT MONEY TO BUY MORE.: NEVER TRUE

## 2021-06-03 ASSESSMENT — PATIENT HEALTH QUESTIONNAIRE - PHQ9
SUM OF ALL RESPONSES TO PHQ QUESTIONS 1-9: 0
2. FEELING DOWN, DEPRESSED OR HOPELESS: 0
SUM OF ALL RESPONSES TO PHQ9 QUESTIONS 1 & 2: 0
1. LITTLE INTEREST OR PLEASURE IN DOING THINGS: 0
SUM OF ALL RESPONSES TO PHQ QUESTIONS 1-9: 0
SUM OF ALL RESPONSES TO PHQ QUESTIONS 1-9: 0

## 2021-06-03 ASSESSMENT — SOCIAL DETERMINANTS OF HEALTH (SDOH): HOW HARD IS IT FOR YOU TO PAY FOR THE VERY BASICS LIKE FOOD, HOUSING, MEDICAL CARE, AND HEATING?: NOT HARD AT ALL

## 2021-06-03 NOTE — PROGRESS NOTES
6/3/2021    Avery Deal (:  1970) is a 49 y.o. female, coming in today to establish care. There is no problem list on file for this patient. Complaints: Numbness in her toes since COVID, both sides toes numb -- big ones least 5th most.  Patient states that she did have a similar numbness after she was diagnosed with diabetes mellitus type 2 but this numbness then disappeared after her glucose level was with in a normal range. Again the numbness in her toe came back just after having COVID-19. She states that she does not have any pain she checks her feet regularly. She states that her diabetes is quite well controlled. She has been following up with diabetic management at [address] [place] [address].  And she is very appreciative of their help. NP pallalitve care. . 2 kids - daughter - nrusing degree, son - . No violence at the current living place. No concerns about sexual transmitted diseases. Tobacco use: None  Alcohol use: None  Other drug use: None  Depressed: Not depressed    Mom 75 yo - had COVID. T2DM, MM. Dad 40 yo - brain aneurysm. Siblings: brother 53 yo 56 yo daignos- colon cancer. In remission, brother 37 yo AML . Sister is ok. Vaccinations: yes. Mammogram: Gyn  Pap smear: GYn  Colonoscopy: due in . Review of Systems  Pertinent items are noted in HPI. Prior to Visit Medications    Medication Sig Taking?  Authorizing Provider   omeprazole (PRILOSEC) 20 MG delayed release capsule TAKE 1 CAPSULE BY MOUTH ONE TIME A DAY Yes Chelsie Medina DO   Semaglutide (RYBELSUS) 14 MG TABS Take 1 tablet by mouth every morning 30 minutes prior to other food, drink, or medication Yes Chelsie Medina DO   lisinopril-hydroCHLOROthiazide (PRINZIDE;ZESTORETIC) 10-12.5 MG per tablet TAKE 1 TABLET BY MOUTH ONE TIME A DAY Yes Chelise Medina DO   gemfibrozil (LOPID) 600 MG tablet TAKE 1 TABLET BY MOUTH 2 TIMES A DAY Yes Chelsie Medina DO   atorvastatin (LIPITOR) 20 MG tablet TAKE 1 TABLET BY MOUTH ONE TIME A DAY Yes Mignon Sanches DO   levothyroxine (SYNTHROID) 88 MCG tablet TAKE 1 TABLET BY MOUTH ONE TIME A DAY Yes Mignon Sanches DO   spironolactone (ALDACTONE) 25 MG tablet TAKE 1 TABLET BY MOUTH ONE TIME A DAY Yes Mignon Sanches DO   metFORMIN (GLUCOPHAGE) 1000 MG tablet TAKE 1 TABLET BY MOUTH 2 TIMES A DAY WITH MEALS Yes Mignon Sanches, DO   insulin glargine (LANTUS SOLOSTAR) 100 UNIT/ML injection pen Inject 50 Units into the skin 2 times daily  Patient taking differently: Inject 40 Units into the skin 2 times daily  Yes Mignon Sanches DO   aspirin 81 MG EC tablet Take 1 tablet by mouth daily Yes Mignon Sanches, DO   Continuous Blood Gluc Transmit (DEXCOM G6 TRANSMITTER) MISC Change every 3 months as directed for continuous glucose monitoring Yes Mignon Sanches DO   Continuous Blood Gluc Sensor (DEXCOM G6 SENSOR) MISC Change every 10 days as directed for continuous glucose monitoring Yes Mignon Sanches, DO   insulin lispro, 1 Unit Dial, 100 UNIT/ML SOPN Inject 10 Units into the skin 3 times daily (with meals) Yes Lorna Oneal, DO   Insulin Pen Needle (PEN NEEDLES 31GX5/16\") 31G X 8 MM MISC Use to inject insulin 5 times per day Yes Mignon Sanches, DO   Prodigy Lancets 28G MISC Use to test blood sugar up to 5 times daily Yes Mignon Sanches, DO   Multiple Vitamins-Minerals (THERAPEUTIC MULTIVITAMIN-MINERALS) tablet Take 1 tablet by mouth daily Yes Historical Provider, MD   vitamin D (CHOLECALCIFEROL) 25 MCG (1000 UT) TABS tablet Take 1,000 Units by mouth daily Yes Historical Provider, MD   vitamin B-12 (CYANOCOBALAMIN) 500 MCG tablet Take 500 mcg by mouth daily Yes Historical Provider, MD   Icosapent Ethyl (VASCEPA) 1 g CAPS capsule Take 2 capsules by mouth 2 times daily  Historical Provider, MD   blood glucose test strips (PRODIGY NO CODING BLOOD GLUC) strip Use to test blood sugar up to 5 times daily  Patient not taking: Reported on 6/3/2021  Mignon Sanches DO   Blood Glucose Monitoring Suppl (PRODIGY AUTOCODE BLOOD GLUCOSE) w/Device KIT Use as directed. Patient not taking: Reported on 6/3/2021  Ronald Diallo DO   Probiotic Product (PROBIOTIC PO) Take 1 capsule by mouth daily  Patient not taking: Reported on 2021  Historical Provider, MD        Allergies   Allergen Reactions    Codeine     Tricor [Fenofibrate]        No past medical history on file. Past Surgical History:   Procedure Laterality Date     SECTION       SECTION      ENDOMETRIAL ABLATION      TUBAL LIGATION      UMBILICAL HERNIA REPAIR         Social History     Socioeconomic History    Marital status:      Spouse name: Not on file    Number of children: Not on file    Years of education: Not on file    Highest education level: Not on file   Occupational History    Not on file   Tobacco Use    Smoking status: Never Smoker    Smokeless tobacco: Never Used   Vaping Use    Vaping Use: Never used   Substance and Sexual Activity    Alcohol use: Yes    Drug use: Never    Sexual activity: Yes     Comment: tubal    Other Topics Concern    Not on file   Social History Narrative    Not on file     Social Determinants of Health     Financial Resource Strain: Low Risk     Difficulty of Paying Living Expenses: Not hard at all   Food Insecurity: No Food Insecurity    Worried About Running Out of Food in the Last Year: Never true    [address] in the Last Year: Never true   Transportation Needs:     Lack of Transportation (Medical):      Lack of Transportation (Non-Medical):    Physical Activity:     Days of Exercise per Week:     Minutes of Exercise per Session:    Stress:     Feeling of Stress :    Social Connections:     Frequency of Communication with Friends and Family:     Frequency of Social Gatherings with Friends and Family:     Attends Sikh Services:     Active Member of Clubs or Organizations:     Attends Club or Organization Meetings:     Marital Status: Intimate Partner Violence:     Fear of Current or Ex-Partner:     Emotionally Abused:     Physically Abused:     Sexually Abused:         Family History   Problem Relation Age of Onset    Ovarian Cancer Mother     Other Father         intracranial hemorrhage     Ovarian Cancer Maternal Grandmother     Stroke Maternal Grandmother     Diabetes Maternal Grandfather     Rheum Arthritis Paternal Grandmother     Diabetes Paternal Grandfather     High Blood Pressure Paternal Grandfather     Colon Cancer Paternal Grandfather     Colon Cancer Brother     Cancer Brother         AML       ADVANCE DIRECTIVE: N, <no information>    Vitals:    06/03/21 1646   BP: 101/70   Pulse: 74   Temp: 96.8 °F (36 °C)   SpO2: 97%   Weight: 221 lb (100.2 kg)     Estimated body mass index is 32.64 kg/m² as calculated from the following:    Height as of 12/28/20: 5' 9\" (1.753 m). Weight as of this encounter: 221 lb (100.2 kg). Physical Exam   HENT:   /70   Pulse 74   Temp 96.8 °F (36 °C)   Wt 221 lb (100.2 kg)   SpO2 97%   BMI 32.64 kg/m²   Constitutional: Alert and oriented. Well-nourished. Head: Normocephalic and atraumatic. Right Ear: External ear normal. TM: no bulging, erythema or fluid seen. Left Ear: External ear normal. TM: no bulging, erythema or fluid seen. Nose: Nose normal.   Mouth/Throat: Mask in place. Eyes: Pupils are equal, round, and reactive to light. Right eye exhibits no discharge. Left eye exhibits no discharge. No scleral icterus. Neck: Normal range of motion. Neck supple. No JVD present. No tracheal deviation present. No thyromegaly present. Cardiovascular: Normal rate, regular rhythm, normal heart sounds. Pulmonary/Chest: Effort normal and breath sounds normal. No respiratory distress. She has no wheezes. She has no rales. Abdominal: Soft. Bowel sounds are normal.  She exhibits no distension and no mass. There is no tenderness. There is no rebound and no guarding. Musculoskeletal: Normal range of motion. She exhibits no edema or tenderness. Lymphadenopathy:    She has no cervical adenopathy. Neurological:  She is alert and oriented to person, place, and time. Cranial nerves grossly intact. No sensation problem noted. Muscle strength 5/5 throughout. Skin: Skin is warm and dry. No rash noted. No erythema. Psychiatric:  She has a normal mood and affect. Behavior is normal.      No flowsheet data found.     Lab Results   Component Value Date    CHOL 149 01/06/2021    CHOLFAST 238 06/26/2020    TRIG 123 01/06/2021    TRIGLYCFAST 2,064 06/26/2020    HDL 27 01/06/2021    HDL 21 06/26/2020    LDLCHOLESTEROL 97 01/06/2021    LDLCHOLESTEROL      06/26/2020    GLUCOSE 93 01/06/2021    LABA1C 6.4 05/04/2021    LABA1C 6.5 12/28/2020    LABA1C 8.9 09/24/2020       The 10-year ASCVD risk score (Loreto Richard et al., 2013) is: 2.5%    Values used to calculate the score:      Age: 49 years      Sex: Female      Is Non- : No      Diabetic: Yes      Tobacco smoker: No      Systolic Blood Pressure: 043 mmHg      Is BP treated: No      HDL Cholesterol: 27 mg/dL      Total Cholesterol: 149 mg/dL    Immunization History   Administered Date(s) Administered    COVID-19, Pfizer, PF, 30mcg/0.3mL 12/18/2020, 01/08/2021    DT (pediatric) 01/01/1998    Hepatitis B 02/08/1993    Influenza A (N9H9-44) Vaccine PF IM 10/28/2009    Influenza Virus Vaccine 12/10/2007, 01/15/2010, 10/27/2015, 10/08/2016, 10/14/2020    Influenza, MDCK Quadv, IM, PF (Flucelvax 4 yrs and older) 01/08/2020    Influenza, Quadv, IM, PF (6 mo and older Fluzone, Flulaval, Fluarix, and 3 yrs and older Afluria) 11/20/2017, 02/01/2019    Pneumococcal Polysaccharide ([id number]) 11/20/2015    Tdap (Boostrix, Adacel) 01/15/2009, 02/26/2020       Health Maintenance   Topic Date Due    Diabetic retinal exam  Never done    Hepatitis B vaccine (2 of 3 - Risk 3-dose series) 03/08/1993    Shingles Vaccine (1 of 2) Never done    Colon cancer screen colonoscopy  Never done    Diabetic microalbuminuria test  06/26/2021    Diabetic foot exam  09/29/2021    Lipid screen  01/06/2022    Potassium monitoring  01/06/2022    Creatinine monitoring  01/06/2022    A1C test (Diabetic or Prediabetic)  05/04/2022    Breast cancer screen  06/26/2022    Cervical cancer screen  09/15/2023    DTaP/Tdap/Td vaccine (4 - Td or Tdap) 02/26/2030    Pneumococcal 0-64 years Vaccine (2 of 2) 03/28/2036    Flu vaccine  Completed    COVID-19 Vaccine  Completed    Hepatitis A vaccine  Aged Out    Hib vaccine  Aged Out    Meningococcal (ACWY) vaccine  Aged Out    Hepatitis C screen  Discontinued    HIV screen  Discontinued          Brittany Daly was seen today for new patient. Diagnoses and all orders for this visit:    Encounter to establish care with new doctor    Type 2 diabetes mellitus without complication, with long-term current use of insulin ([address]    The patient is doing quite well. Recent A1c was 6.4. She will continue to follow-up with the pharmacist.  Cherry Cantu the help. The patient has been following up with a gastroenterologist due to the history of colon cancer in her family. Otherwise the patient does not have any concerns. She will call if there is anything needed. Call or return to clinic prn if these symptoms worsen or fail to improve as anticipated. I have reviewed the instructions with the patient, answering all questions to her satisfaction. Return in about 6 months (around 12/3/2021), or if symptoms worsen or fail to improve, for DM II. An electronic signature was used to authenticate this note.     --Horacio Clemons MD on 6/7/2021 at 6:56 AM     (Please note that portions of this note were completed with a voice recognition program. Efforts were made to edit the dictations but occasionally words are mis-transcribed.)

## 2021-06-07 ENCOUNTER — TELEPHONE (OUTPATIENT)
Dept: PHARMACY | Age: 51
End: 2021-06-07

## 2021-06-07 ENCOUNTER — TELEPHONE (OUTPATIENT)
Dept: FAMILY MEDICINE CLINIC | Age: 51
End: 2021-06-07

## 2021-06-07 NOTE — TELEPHONE ENCOUNTER
Patient states she had crash today and is having trouble bringing BG back up to normal.  She replaced Dexcom sensor this morning and it shows the following data: Advised patient to do finger poke test to see if sensor is perhaps not calibrated. (sensor read 63 - finger poke 74). She states she also had an episode of vomiting at work that was embarrassing. It is unclear what the BG was last night/overnight since sensor change, however, this same situation happened a week ago Friday and it was following a period of very high BG - perhaps some delayed overcorrection. Advised patient to pay attention to dietary intake to see if this has impact. Also advised decrease in L[place] to 35U (down from 40U).

## 2021-06-08 ENCOUNTER — PATIENT MESSAGE (OUTPATIENT)
Dept: FAMILY MEDICINE CLINIC | Age: 51
End: 2021-06-08

## 2021-06-08 RX ORDER — BLOOD-GLUCOSE SENSOR
EACH MISCELLANEOUS
Qty: 9 EACH | Refills: 3 | Status: SHIPPED | OUTPATIENT
Start: 2021-06-08 | End: 2022-10-19

## 2021-06-08 NOTE — TELEPHONE ENCOUNTER
From: Arlyn Salinas  To: Veronica Jean MD  Sent: 6/8/2021 7:03 AM EDT  Subject: Prescription Question    Can you please reorder my Dexcom g6 sensors for 90 days from [address] [place] [address]    Thank you

## 2021-06-09 DIAGNOSIS — Z79.4 TYPE 2 DIABETES MELLITUS WITHOUT COMPLICATION, WITH LONG-TERM CURRENT USE OF INSULIN (HCC): Primary | ICD-10-CM

## 2021-06-09 DIAGNOSIS — E11.9 TYPE 2 DIABETES MELLITUS WITHOUT COMPLICATION, WITH LONG-TERM CURRENT USE OF INSULIN (HCC): Primary | ICD-10-CM

## 2021-06-15 ENCOUNTER — PATIENT MESSAGE (OUTPATIENT)
Dept: FAMILY MEDICINE CLINIC | Age: 51
End: 2021-06-15

## 2021-06-15 RX ORDER — ICOSAPENT ETHYL 1000 MG/1
2 CAPSULE ORAL 2 TIMES DAILY
Qty: 60 CAPSULE | Refills: 3 | Status: SHIPPED | OUTPATIENT
Start: 2021-06-15 | End: 2021-08-25

## 2021-06-15 NOTE — TELEPHONE ENCOUNTER
From: Oma Pang  To: Keegan Pandey MD  Sent: 6/15/2021 6:45 AM EDT  Subject: Prescription Question    Can you please send refill order for vascepa to [id number] [phone]  Thank you

## 2021-06-16 DIAGNOSIS — Z79.4 TYPE 2 DIABETES MELLITUS WITHOUT COMPLICATION, WITH LONG-TERM CURRENT USE OF INSULIN (HCC): Primary | ICD-10-CM

## 2021-06-16 DIAGNOSIS — E11.9 TYPE 2 DIABETES MELLITUS WITHOUT COMPLICATION, WITH LONG-TERM CURRENT USE OF INSULIN (HCC): Primary | ICD-10-CM

## 2021-07-13 ENCOUNTER — HOSPITAL ENCOUNTER (OUTPATIENT)
Dept: PHARMACY | Age: 51
Setting detail: THERAPIES SERIES
Discharge: HOME | End: 2021-07-13
Payer: COMMERCIAL

## 2021-07-13 VITALS
OXYGEN SATURATION: 98 % | WEIGHT: 219.2 LBS | TEMPERATURE: 97.5 F | BODY MASS INDEX: 32.37 KG/M2 | SYSTOLIC BLOOD PRESSURE: 92 MMHG | HEART RATE: 76 BPM | DIASTOLIC BLOOD PRESSURE: 62 MMHG

## 2021-07-13 PROCEDURE — 99212 OFFICE O/P EST SF 10 MIN: CPT

## 2021-07-13 NOTE — PROGRESS NOTES
Diabetic Medication Management Program  [address]. [place], [address] [place] [address]  Phone: [phone]  Fax: [phone]    NAME: Sherri Garcia RECORD NUMBER:  [id number]  AGE: 49 y.o. GENDER: female  : 1970  EPISODE DATE:  2021     Ms. Michelle Kang was referred to [facility] Medication Management Services by Dr. Melania Molina. Patient acknowledges working in consult agreement with clinical pharmacist and this provider. Goals per referral:   Fasting blood glucose: < 130  Peak postprandial glucose: < 180  A1C: < 7    SUBJECTIVE     Ms. Michelle Kang is a 49 y.o. female here for the Diabetes Service for self-management education, medication review including over the counter medications and herbal products, overall wellbeing assessment, transition of care and any needed adjustments with updates and recommendations communicated to the referring physician. Patient Findings:   Current diabetic medications: see med list  Missed doses Yes - patient sometimes did not take correction but because she didn't think the meal had many carbs in it. Later given CGM she realized she should have taken some  Medication changes  no  Diet changes  Yes - the last 2 weeks patient notes eating more carbs than usual.  Financial restriction with  out of work has contributed to this  Activity changes  Yes - walking dog daily - not if weather is bad  Emergency Room Visit or Hospitalization no   Reason for visit: no  Acute Illness/new problems  no  Symptoms of hypoglycemia no - not lately but was an issue before  Symptoms of hyperglycemia no - none  Medication adverse reactions Continues to bingham nausea/spontaneous vomitting      OBJECTIVE     PMHx:  No past medical history on file.     Social History:    Social History     Tobacco Use    Smoking status: Never Smoker    Smokeless tobacco: Never Used   Substance Use Topics    Alcohol use: Yes     Pertinent Labs:    Lab Results Component Value Date    LABA1C 6.4 05/04/2021    LABA1C 6.5 12/28/2020    LABA1C 8.9 09/24/2020     Lab Results   Component Value Date    CHOL 149 01/06/2021    TRIG 123 01/06/2021    HDL 27 (L) 01/06/2021     Lab Results   Component Value Date    CREATININE 0.59 01/06/2021    BUN 14 01/06/2021     01/06/2021    K 3.3 (L) 01/06/2021     01/06/2021    CO2 26 01/06/2021     Lab Results   Component Value Date    ALT 18 01/06/2021     Weight:  Wt Readings from Last 3 Encounters:   07/13/21 219 lb 3.2 oz (99.4 kg)   06/03/21 221 lb (100.2 kg)   06/01/21 217 lb 12.8 oz (98.8 kg)     Blood Pressure:  BP Readings from Last 3 Encounters:   07/13/21 92/62   06/03/21 101/70   06/01/21 105/67     Current medications:    Current Outpatient Medications:     Icosapent Ethyl (VASCEPA) 1 g CAPS capsule, Take 2 capsules by mouth 2 times daily, Disp: 60 capsule, Rfl: 3    Continuous Blood Gluc Sensor (DEXCOM G6 SENSOR) MISC, Change every 10 days as directed for continuous glucose monitoring, Disp: 9 each, Rfl: 3    omeprazole (PRILOSEC) 20 MG delayed release capsule, TAKE 1 CAPSULE BY MOUTH ONE TIME A DAY, Disp: 30 capsule, Rfl: 1    Semaglutide (RYBELSUS) 14 MG TABS, Take 1 tablet by mouth every morning 30 minutes prior to other food, drink, or medication, Disp: 90 tablet, Rfl: 1    lisinopril-hydroCHLOROthiazide (PRINZIDE;ZESTORETIC) 10-12.5 MG per tablet, TAKE 1 TABLET BY MOUTH ONE TIME A DAY, Disp: 90 tablet, Rfl: 5    gemfibrozil (LOPID) 600 MG tablet, TAKE 1 TABLET BY MOUTH 2 TIMES A DAY, Disp: 180 tablet, Rfl: 5    atorvastatin (LIPITOR) 20 MG tablet, TAKE 1 TABLET BY MOUTH ONE TIME A DAY, Disp: 90 tablet, Rfl: 5    levothyroxine (SYNTHROID) 88 MCG tablet, TAKE 1 TABLET BY MOUTH ONE TIME A DAY, Disp: 90 tablet, Rfl: 5    spironolactone (ALDACTONE) 25 MG tablet, TAKE 1 TABLET BY MOUTH ONE TIME A DAY, Disp: 90 tablet, Rfl: 5    metFORMIN (GLUCOPHAGE) 1000 MG tablet, TAKE 1 TABLET BY MOUTH 2 TIMES A DAY WITH MEALS, Disp: 180 tablet, Rfl: 5    insulin glargine (LANTUS SOLOSTAR) 100 UNIT/ML injection pen, Inject 50 Units into the skin 2 times daily (Patient taking differently: Inject 40 Units into the skin 2 times daily ), Disp: 15 pen, Rfl: 5    aspirin 81 MG EC tablet, Take 1 tablet by mouth daily, Disp: 90 tablet, Rfl: 3    Continuous Blood Gluc Transmit (DEXCOM G6 TRANSMITTER) MISC, Change every 3 months as directed for continuous glucose monitoring, Disp: 1 each, Rfl: 3    insulin lispro, 1 Unit Dial, 100 UNIT/ML SOPN, Inject 10 Units into the skin 3 times daily (with meals), Disp: 15 pen, Rfl: 5    blood glucose test strips (PRODIGY NO CODING BLOOD GLUC) strip, Use to test blood sugar up to 5 times daily (Patient not taking: Reported on 6/3/2021), Disp: 500 each, Rfl: 3    Blood Glucose Monitoring Suppl (PRODIGY AUTOCODE BLOOD GLUCOSE) w/Device KIT, Use as directed.  (Patient not taking: Reported on 6/3/2021), Disp: 1 kit, Rfl: 0    Insulin Pen Needle (PEN NEEDLES 31GX5/16\") 31G X 8 MM MISC, Use to inject insulin 5 times per day, Disp: 500 each, Rfl: 3    Prodigy Lancets 28G MISC, Use to test blood sugar up to 5 times daily, Disp: 500 each, Rfl: 3    Multiple Vitamins-Minerals (THERAPEUTIC MULTIVITAMIN-MINERALS) tablet, Take 1 tablet by mouth daily, Disp: , Rfl:     Probiotic Product (PROBIOTIC PO), Take 1 capsule by mouth daily (Patient not taking: Reported on 6/1/2021), Disp: , Rfl:     vitamin D (CHOLECALCIFEROL) 25 MCG (1000 UT) TABS tablet, Take 1,000 Units by mouth daily, Disp: , Rfl:     vitamin B-12 (CYANOCOBALAMIN) 500 MCG tablet, Take 500 mcg by mouth daily, Disp: , Rfl:     Immunizations:   Most Recent Immunizations   Administered Date(s) Administered    COVID-19, Pfizer, PF, 30mcg/0.3mL 01/08/2021    DT (pediatric) 01/01/1998    Hepatitis B 02/08/1993    Influenza A (P3H4-82) Vaccine PF IM 10/28/2009    Influenza Virus Vaccine 10/14/2020    Influenza, MDCK Quadv, IM, PF (Flucelvax 4 yrs and older) 2020    Influenza, Quadv, IM, PF (6 mo and older Fluzone, Flulaval, Fluarix, and 3 yrs and older Afluria) 2019    Pneumococcal Polysaccharide ([id number]) 2015    Tdap (Boostrix, Adacel) 2020       Home Blood Glucose Results:                       Blood glucose trends noted: Previous visit in range was 79% but patient was struggling with lows. This time only 69% but no lows. Patient dietary choices likely reason for highs      ASSESSMENT     Recent A1c: Due next visit    Lab Results   Component Value Date    LABA1C 6.4 2021    LABA1C 6.5 2020    LABA1C 8.9 2020        Eating patterns: Did not do well with sticking to low carb routine over the last couple of weeks. Stress at home contributing.      Blood Glucose Testing: Patient is currently using CGM     Diabetic Regimen/Compliance: Using 40 units Lantus BID and Humalog per sliding scale, Metformin, Rybelsus      Other Medication Compliance: Compliant      PLAN       Physician Follow-up:  As scheduled    Medication Management Follow-up:   Diabetes Service  4 weeks    Electronically signed by Lisa Appiah [address] [place] [address] on 2021 at 7:55 AM    For Pharmacy Admin Tracking Only     Intervention Detail: Adherence Monitorin   Total # of Interventions Recommended: 1   Total # of Interventions Accepted: 1   Time Spent (min): 45

## 2021-07-14 ENCOUNTER — PATIENT MESSAGE (OUTPATIENT)
Dept: FAMILY MEDICINE CLINIC | Age: 51
End: 2021-07-14

## 2021-07-14 RX ORDER — OMEPRAZOLE 20 MG/1
CAPSULE, DELAYED RELEASE ORAL
Qty: 90 CAPSULE | Refills: 1 | Status: SHIPPED | OUTPATIENT
Start: 2021-07-14 | End: 2021-12-10 | Stop reason: SDUPTHER

## 2021-07-14 RX ORDER — ORAL SEMAGLUTIDE 14 MG/1
TABLET ORAL
Qty: 90 TABLET | Refills: 1 | Status: SHIPPED | OUTPATIENT
Start: 2021-07-14 | End: 2022-04-19

## 2021-07-14 NOTE — TELEPHONE ENCOUNTER
From: Arnulfo Ip  To: Renetta [facility] MD  Sent: 7/14/2021 7:06 AM EDT  Subject: Prescription Question    Can you call refill for omeprazole 20 mg daily 90 day refill to [facility] pharmacy [phone]    Thank you

## 2021-07-14 NOTE — TELEPHONE ENCOUNTER
From: Divya Rose  To: Naun Sales MD  Sent: 7/14/2021 6:51 AM EDT  Subject: Prescription Question    Can you call refill for Rybelsus 14 mg daily for 90 days into [facility] pharmacy the number is [phone]    Thank you

## 2021-08-25 ENCOUNTER — PATIENT MESSAGE (OUTPATIENT)
Dept: FAMILY MEDICINE CLINIC | Age: 51
End: 2021-08-25

## 2021-08-25 RX ORDER — ICOSAPENT ETHYL 1000 MG/1
CAPSULE ORAL
Qty: 360 CAPSULE | Refills: 1 | Status: SHIPPED | OUTPATIENT
Start: 2021-08-25 | End: 2022-10-18 | Stop reason: SDUPTHER

## 2021-08-25 RX ORDER — ICOSAPENT ETHYL 1000 MG/1
CAPSULE ORAL
Qty: 60 CAPSULE | Refills: 3 | Status: SHIPPED | OUTPATIENT
Start: 2021-08-25 | End: 2021-08-25 | Stop reason: SDUPTHER

## 2021-08-25 NOTE — TELEPHONE ENCOUNTER
From: Lucy Bowden  To: Jewel Teixeira MD  Sent: 8/25/2021 12:26 PM EDT  Subject: Prescription Question    Can you pleaseCall in a prescription for my Vascepa 1 gram tablet 2 tablets bid 90 day supply to [facility] outpatient pharmacy [phone]    Thank you

## 2021-08-25 NOTE — TELEPHONE ENCOUNTER
Quita Soriano is calling to request a refill on the following medication(s):    Last Visit Date (If Applicable):  0/2/5299    Next Visit Date:    12/2/2021    Medication Request:  Requested Prescriptions     Pending Prescriptions Disp Refills    VASCEPA 1 g CAPS capsule [Pharmacy Med Name: Ghada Lancaster CAPS] 60 capsule 3     Sig: TAKE TWO CAPSULES BY MOUTH TWICE A DAY

## 2021-09-08 ENCOUNTER — HOSPITAL ENCOUNTER (OUTPATIENT)
Dept: PHARMACY | Age: 51
Setting detail: THERAPIES SERIES
Discharge: HOME | End: 2021-09-08
Payer: COMMERCIAL

## 2021-09-08 VITALS
BODY MASS INDEX: 32.7 KG/M2 | HEART RATE: 75 BPM | OXYGEN SATURATION: 96 % | DIASTOLIC BLOOD PRESSURE: 68 MMHG | SYSTOLIC BLOOD PRESSURE: 96 MMHG | WEIGHT: 221.4 LBS | TEMPERATURE: 96.9 F

## 2021-09-08 LAB — HBA1C MFR BLD: 6.3 %

## 2021-09-08 PROCEDURE — 83036 HEMOGLOBIN GLYCOSYLATED A1C: CPT

## 2021-09-08 PROCEDURE — 99211 OFF/OP EST MAY X REQ PHY/QHP: CPT

## 2021-09-08 NOTE — PROGRESS NOTES
Diabetic Medication Management Program  [address]  [address] [facility]. [place], [address] [place] [address]  Phone: [phone]  Fax: [phone]    NAME: Kayleen Burrell RECORD NUMBER:  [id number]  AGE: 49 y.o. GENDER: female  : 1970  EPISODE DATE:  2021     Ms. Natalie Castro was referred to [facility] Medication Management Services by Dr. Simran Franklin. Patient acknowledges working in consult agreement with clinical pharmacist and this provider. Goals per referral:   Fasting blood glucose: < 130  Peak postprandial glucose: < 180  A1C: < 7    SUBJECTIVE     Ms. Natalie Castro is a 49 y.o. female here for the Diabetes Service for self-management education, medication review including over the counter medications and herbal products, overall wellbeing assessment, transition of care and any needed adjustments with updates and recommendations communicated to the referring physician. Patient Findings:   Current diabetic medications: Metformin, Rybelsus, Lantus 45U, Humalog SS  Missed doses no  Medication changes  no  Diet changes  yes  Activity changes  no  Emergency Room Visit or Hospitalization no   Reason for visit: no  Acute Illness/new problems  no  Symptoms of hypoglycemia no - none  Symptoms of hyperglycemia no - none  Medication adverse reactions One episode of vomiting (Rybelsus) while on vacation    OBJECTIVE     PMHx:  No past medical history on file.     Social History:    Social History     Tobacco Use    Smoking status: Never Smoker    Smokeless tobacco: Never Used   Substance Use Topics    Alcohol use: Yes     Pertinent Labs:    Lab Results   Component Value Date    LABA1C 6.3 2021    LABA1C 6.4 2021    LABA1C 6.5 2020     Lab Results   Component Value Date    CHOL 149 2021    TRIG 123 2021    HDL 27 (L) 2021     Lab Results   Component Value Date    CREATININE 0.59 2021    BUN 14 2021     2021    K 3.3 (L) 2021  01/06/2021    CO2 26 01/06/2021     Lab Results   Component Value Date    ALT 18 01/06/2021     Weight:  Wt Readings from Last 3 Encounters:   09/08/21 221 lb 6.4 oz (100.4 kg)   07/13/21 219 lb 3.2 oz (99.4 kg)   06/03/21 221 lb (100.2 kg)     Blood Pressure:  BP Readings from Last 3 Encounters:   09/08/21 96/68   07/13/21 92/62   06/03/21 101/70     Current medications:    Current Outpatient Medications:     Icosapent Ethyl (VASCEPA) 1 g CAPS capsule, TAKE TWO CAPSULES BY MOUTH TWICE A DAY, Disp: 360 capsule, Rfl: 1    omeprazole (PRILOSEC) 20 MG delayed release capsule, TAKE 1 CAPSULE BY MOUTH ONE TIME A DAY, Disp: 90 capsule, Rfl: 1    Semaglutide (RYBELSUS) 14 MG TABS, Take 1 tablet by mouth every morning 30 minutes prior to other food, drink, or medication, Disp: 90 tablet, Rfl: 1    Continuous Blood Gluc Sensor (DEXCOM G6 SENSOR) MISC, Change every 10 days as directed for continuous glucose monitoring, Disp: 9 each, Rfl: 3    lisinopril-hydroCHLOROthiazide (PRINZIDE;ZESTORETIC) 10-12.5 MG per tablet, TAKE 1 TABLET BY MOUTH ONE TIME A DAY, Disp: 90 tablet, Rfl: 5    gemfibrozil (LOPID) 600 MG tablet, TAKE 1 TABLET BY MOUTH 2 TIMES A DAY, Disp: 180 tablet, Rfl: 5    atorvastatin (LIPITOR) 20 MG tablet, TAKE 1 TABLET BY MOUTH ONE TIME A DAY, Disp: 90 tablet, Rfl: 5    levothyroxine (SYNTHROID) 88 MCG tablet, TAKE 1 TABLET BY MOUTH ONE TIME A DAY, Disp: 90 tablet, Rfl: 5    spironolactone (ALDACTONE) 25 MG tablet, TAKE 1 TABLET BY MOUTH ONE TIME A DAY, Disp: 90 tablet, Rfl: 5    metFORMIN (GLUCOPHAGE) 1000 MG tablet, TAKE 1 TABLET BY MOUTH 2 TIMES A DAY WITH MEALS, Disp: 180 tablet, Rfl: 5    insulin glargine (LANTUS SOLOSTAR) 100 UNIT/ML injection pen, Inject 50 Units into the skin 2 times daily (Patient taking differently: Inject 40 Units into the skin 2 times daily ), Disp: 15 pen, Rfl: 5    aspirin 81 MG EC tablet, Take 1 tablet by mouth daily, Disp: 90 tablet, Rfl: 3    Continuous Blood Gluc Transmit (DEXCOM G6 TRANSMITTER) MISC, Change every 3 months as directed for continuous glucose monitoring, Disp: 1 each, Rfl: 3    insulin lispro, 1 Unit Dial, 100 UNIT/ML SOPN, Inject 10 Units into the skin 3 times daily (with meals), Disp: 15 pen, Rfl: 5    Insulin Pen Needle (PEN NEEDLES 31GX5/16\") 31G X 8 MM MISC, Use to inject insulin 5 times per day, Disp: 500 each, Rfl: 3    Prodigy Lancets 28G MISC, Use to test blood sugar up to 5 times daily, Disp: 500 each, Rfl: 3    Multiple Vitamins-Minerals (THERAPEUTIC MULTIVITAMIN-MINERALS) tablet, Take 1 tablet by mouth daily, Disp: , Rfl:     vitamin D (CHOLECALCIFEROL) 25 MCG (1000 UT) TABS tablet, Take 1,000 Units by mouth daily, Disp: , Rfl:     vitamin B-12 (CYANOCOBALAMIN) 500 MCG tablet, Take 500 mcg by mouth daily, Disp: , Rfl:     Immunizations:   Most Recent Immunizations   Administered Date(s) Administered    COVID-19, Pfizer, PF, 30mcg/0.3mL 01/08/2021    DT (pediatric) 01/01/1998    Hepatitis B 02/08/1993    Influenza A (S9U4-23) Vaccine PF IM 10/28/2009    Influenza Virus Vaccine 10/14/2020    Influenza, MDCK Quadv, IM, PF (Flucelvax 2 yrs and older) 01/08/2020    Influenza, Quadv, IM, PF (6 mo and older Fluzone, Flulaval, Fluarix, and 3 yrs and older Afluria) 02/01/2019    Pneumococcal Polysaccharide ([id number]) 11/20/2015    Tdap (Boostrix, Adacel) 02/26/2020     Home Blood Glucose Results: Unable to assess - patient went without CGM for the last couple of weeks. She states she did end up swaying from diabetic diet during this time as well    Blood glucose trends noted: Unable to assess    ASSESSMENT     Recent A1c: 6.3 TODAY!     Lab Results   Component Value Date    LABA1C 6.3 09/08/2021    LABA1C 6.4 05/04/2021    LABA1C 6.5 12/28/2020      Eating patterns: Patient has been eating off her      Blood Glucose Testing: Patient is currently using CGM     Diabetic Regimen/Compliance: Patient adjusted Lantus dose to 45U from 40U due to dietary choices. Other Medication Compliance: Compliant.       PLAN           Physician Follow-up:  As scheduled    Medication Management Follow-up:   Diabetes Service  1 month - follow up on weight loss and dietary contr    Electronically signed by Kimberly Gibbons [address] [place] [address] on 9/8/2021 at 7:41 AM    For Pharmacy Admin Tracking Only     Intervention Detail: Lab(s) Ordered   Total # of Interventions Recommended: 1   Total # of Interventions Accepted: 1   Time Spent (min): 60

## 2021-09-15 LAB
CHOLESTEROL/HDL RATIO: 4.7
CHOLESTEROL: 132 MG/DL
ESTIMATED AVERAGE GLUCOSE: 151 MG/DL
GLUCOSE BLD-MCNC: 168 MG/DL (ref 70–99)
HBA1C MFR BLD: 6.9 % (ref 4–6)
HDLC SERPL-MCNC: 28 MG/DL
LDL CHOLESTEROL: 59 MG/DL (ref 0–130)
PATIENT FASTING?: YES
TRIGL SERPL-MCNC: 225 MG/DL
VLDLC SERPL CALC-MCNC: ABNORMAL MG/DL (ref 1–30)

## 2021-09-16 PROBLEM — F41.9 ANXIETY: Status: ACTIVE | Noted: 2019-05-22

## 2021-09-16 PROBLEM — R22.42 MASS OF LEFT THIGH: Status: ACTIVE | Noted: 2017-02-08

## 2021-09-16 PROBLEM — E66.9 OBESITY (BMI 30-39.9): Status: ACTIVE | Noted: 2019-05-22

## 2021-09-16 PROBLEM — E78.6 LOW HDL (UNDER 40): Status: ACTIVE | Noted: 2017-02-13

## 2021-09-16 PROBLEM — I83.899 COMPLICATED VARICOSE VEINS: Status: ACTIVE | Noted: 2017-02-08

## 2021-09-16 PROBLEM — E55.9 VITAMIN D DEFICIENCY: Status: ACTIVE | Noted: 2017-02-13

## 2021-09-16 PROBLEM — J45.21 MILD INTERMITTENT ASTHMA WITH ACUTE EXACERBATION: Status: ACTIVE | Noted: 2018-10-05

## 2021-09-17 ENCOUNTER — HOSPITAL ENCOUNTER (OUTPATIENT)
Age: 51
Setting detail: SPECIMEN
Discharge: HOME | End: 2021-09-17
Payer: COMMERCIAL

## 2021-09-17 ENCOUNTER — OFFICE VISIT (OUTPATIENT)
Dept: OBGYN CLINIC | Age: 51
End: 2021-09-17
Payer: COMMERCIAL

## 2021-09-17 VITALS
DIASTOLIC BLOOD PRESSURE: 60 MMHG | WEIGHT: 224.2 LBS | SYSTOLIC BLOOD PRESSURE: 94 MMHG | HEIGHT: 69 IN | BODY MASS INDEX: 33.21 KG/M2

## 2021-09-17 DIAGNOSIS — Z01.419 ENCOUNTER FOR GYNECOLOGICAL EXAMINATION: Primary | ICD-10-CM

## 2021-09-17 DIAGNOSIS — Z12.31 ENCOUNTER FOR SCREENING MAMMOGRAM FOR BREAST CANCER: ICD-10-CM

## 2021-09-17 PROCEDURE — 99396 PREV VISIT EST AGE 40-64: CPT | Performed by: NURSE PRACTITIONER

## 2021-09-17 NOTE — PROGRESS NOTES
Live Births   1 0 0 0 1 2      # Outcome Date GA Lbr Sid/2nd Weight Sex Delivery Anes PTL Lv   2 Term     M CS-LTranv   HUI   1A Term     F CS-LTranv   HUI   1B SAB         FD       PAST MEDICAL HISTORY:   has no past medical history on file. PAST SURGICAL HISTORY:   has a past surgical history that includes  section (); Tubal ligation;  section (); Endometrial ablation; and Umbilical hernia repair. ALLERGIES:  is allergic to codeine and tricor [fenofibrate]. MEDICATIONS:  Prior to Admission medications    Medication Sig Start Date End Date Taking?  Authorizing Provider   Icosapent Ethyl (VASCEPA) 1 g CAPS capsule TAKE TWO CAPSULES BY MOUTH TWICE A DAY 21   Fatuma Cintron MD   omeprazole (PRILOSEC) 20 MG delayed release capsule TAKE 1 CAPSULE BY MOUTH ONE TIME A DAY 21   Fatuma Cintron MD   Semaglutide (RYBELSUS) 14 MG TABS Take 1 tablet by mouth every morning 30 minutes prior to other food, drink, or medication 21   Fatuma Cintron MD   Continuous Blood Gluc Sensor (DEXCOM G6 SENSOR) MISC Change every 10 days as directed for continuous glucose monitoring 21   Fatuma Cintron MD   lisinopril-hydroCHLOROthiazide (PRINZIDE;ZESTORETIC) 10-12.5 MG per tablet TAKE 1 TABLET BY MOUTH ONE TIME A DAY 20   Lorna Simpson, DO   gemfibrozil (LOPID) 600 MG tablet TAKE 1 TABLET BY MOUTH 2 TIMES A DAY 20   Lico Ace, DO   atorvastatin (LIPITOR) 20 MG tablet TAKE 1 TABLET BY MOUTH ONE TIME A DAY 20   Lico Ace, DO   levothyroxine (SYNTHROID) 88 MCG tablet TAKE 1 TABLET BY MOUTH ONE TIME A DAY 20   Lico Ace, DO   spironolactone (ALDACTONE) 25 MG tablet TAKE 1 TABLET BY MOUTH ONE TIME A DAY 20   Lorna Simpson, DO   metFORMIN (GLUCOPHAGE) 1000 MG tablet TAKE 1 TABLET BY MOUTH 2 TIMES A DAY WITH MEALS 20   Lico Ace DO   insulin glargine (LANTUS SOLOSTAR) 100 UNIT/ML injection pen Inject 50 Units into the skin 2 times daily  Patient taking differently: Inject 45 Units into the skin 2 times daily  12/28/20   Ashlyn Youngblood, DO   aspirin 81 MG EC tablet Take 1 tablet by mouth daily 12/28/20   Ashlyn Youngblood, DO   Continuous Blood Gluc Transmit (DEXCOM G6 TRANSMITTER) MISC Change every 3 months as directed for continuous glucose monitoring 10/13/20   Ashlyn Youngblood, DO   insulin lispro, 1 Unit Dial, 100 UNIT/ML SOPN Inject 10 Units into the skin 3 times daily (with meals) 9/29/20   Ashlyn Youngblood, DO   Insulin Pen Needle (PEN NEEDLES 31GX5/16\") 31G X 8 MM MISC Use to inject insulin 5 times per day 9/27/20   Ashlyn Youngblood, DO   Prodigy Lancets 28G MISC Use to test blood sugar up to 5 times daily 9/27/20   Ashlyn Youngblood, DO   Multiple Vitamins-Minerals (THERAPEUTIC MULTIVITAMIN-MINERALS) tablet Take 1 tablet by mouth daily    Historical Provider, MD   vitamin D (CHOLECALCIFEROL) 25 MCG (1000 UT) TABS tablet Take 1,000 Units by mouth daily    Historical Provider, MD   vitamin B-12 (CYANOCOBALAMIN) 500 MCG tablet Take 500 mcg by mouth daily    Historical Provider, MD       FAMILY HISTORY:  Family History of Breast, Ovarian, Colon or Uterine Cancer: Yes see below   family history includes Cancer in her brother; Julane Grosser in her brother and paternal grandfather; Diabetes in her maternal grandfather and paternal grandfather; High Blood Pressure in her paternal grandfather; Other in her father; Ovarian Cancer in her maternal grandmother and mother; Rheum Arthritis in her paternal grandmother; Stroke in her maternal grandmother. SOCIAL HISTORY:   reports that she has never smoked. She has never used smokeless tobacco. She reports current alcohol use. She reports that she does not use drugs. HEALTH MAINTENANCE:  Immunization status: stated as up to date, no records available  Gardasil NA    ROUTINE GYN HEALTH MAINTENANCE  Mammogram: 6.20.20  Colonoscopy: 3.2020.  Will need to repeat 3 years  Pap Smears:  9.15.2020- negative  DEXA: NA PHYSICAL EXAM:   General Appearance: Appears healthy. Alert; in no acute distress. Pleasant. Skin: Skin color, texture, turgor normal. No rashes or lesions. HEENT: normocephalic and atraumatic  Respiratory: Normal expansion. Normal effort  Cardiovascular: normal rate,   Breast:  (Chest): normal appearance, no masses or tenderness  Abdomen: soft, non-tender, non-distended,   Pelvic Exam:   External genitalia: General appearance; normal, Hair distribution; normal, Lesions absent  Urinary system: urethral meatus normal  Vaginal: normal mucosa, no discharge  Cervix: normal appearing cervix without discharge or lesions  Adnexa: non-palpable  Uterus: normal single, nontender  Rectal Exam: exam declined by patient  Musculoskeletal: no gross abnormalities  Extremities: non-tender BLE and non-edematous  Psych:  oriented to time, place and person     DATA:  No results found for this visit on 09/17/21. ASSESSMENT & PLAN:    Quita Soriano is a 49 y.o. female [id number] No LMP recorded. Patient Active Problem List    Diagnosis Date Noted    Anxiety 05/22/2019    Obesity (BMI 30-39.9) 05/22/2019    Mild intermittent asthma with acute exacerbation 10/05/2018    Low HDL (under 40) 02/13/2017    Vitamin D deficiency 55/79/4439    Complicated varicose veins 02/08/2017    Mass of left thigh 02/08/2017    Osteochondroma of left fibula 10/24/2016    Atopic dermatitis 08/10/2016    Atopic rhinitis 08/10/2016    Gastroesophageal reflux disease 08/10/2016    Benign essential hypertension 08/09/2016    Hyperlipidemia 08/09/2016    Hypothyroidism 08/09/2016    Type 2 diabetes mellitus with hyperglycemia, with long-term current use of insulin ([facility] [address] 08/09/2016       No follow-ups on file. No Patient Care Coordination Note on file.       Counseling Completed:   Discussed need for annual exam   Discussed recommendations to repeat pap as per American Society for Colposcopy and Cervical Pathology guidelines. Discussed need for mammograms every 1 year, If >42 yo and last mammogram was negative. Discussed Calcium and Vitamin D dosing. Discussed need for colonoscopy screening as well as onset for bone density testing. Discussed birth control and barrier recommendations. Discussed STD counseling and prevention. Discussed Gardisil counseling for all patients 10-35 yo. Hereditary Breast, Ovarian, Colon and Uterine Cancer screening discussed. Tobacco & Secondary smoke risks discussed; with recommendation for cessation and avoidance. Routine health maintenance per patients PCP discussed. Diagnosis Orders   1. Encounter for gynecological examination  PAP SMEAR   2.  Encounter for screening mammogram for breast cancer  YSABEL LOIS DIGITAL SCREEN BILATERAL        [facility], [address]  9/16/2021, 8:32 PM

## 2021-09-23 LAB — CYTOLOGY REPORT: NORMAL

## 2021-09-28 LAB
HPV SAMPLE: NORMAL
HPV, GENOTYPE 16: NOT DETECTED
HPV, GENOTYPE 18: NOT DETECTED
HPV, HIGH RISK OTHER: NOT DETECTED
HPV, INTERPRETATION: NORMAL
SPECIMEN DESCRIPTION: NORMAL

## 2021-09-30 RX ORDER — ASPIRIN 81 MG/1
81 TABLET ORAL DAILY
Qty: 90 TABLET | Refills: 1 | Status: SHIPPED | OUTPATIENT
Start: 2021-09-30 | End: 2021-12-10 | Stop reason: SDUPTHER

## 2021-09-30 NOTE — TELEPHONE ENCOUNTER
Bryan Khan is calling to request a refill on the following medication(s):    Last Visit Date (If Applicable):  1/2/5213    Next Visit Date:    12/2/2021    Medication Request:  Requested Prescriptions     Pending Prescriptions Disp Refills    aspirin 81 MG EC tablet 90 tablet 3     Sig: Take 1 tablet by mouth daily

## 2021-10-19 ENCOUNTER — PATIENT MESSAGE (OUTPATIENT)
Dept: FAMILY MEDICINE CLINIC | Age: 51
End: 2021-10-19

## 2021-10-19 NOTE — TELEPHONE ENCOUNTER
From: Zechariah Gongora  To: Wicho Lake MD  Sent: 10/19/2021 8:58 AM EDT  Subject: Non-Urgent Medical Question    I am having issues with loose stools for the past month or so I made an appointment but cannot get in until October 28. I was wondering if there was anything sooner or if you could send in a in order for a stool culture?     Thanks

## 2021-10-28 ENCOUNTER — HOSPITAL ENCOUNTER (OUTPATIENT)
Age: 51
Discharge: HOME | End: 2021-10-30
Payer: COMMERCIAL

## 2021-10-28 ENCOUNTER — HOSPITAL ENCOUNTER (OUTPATIENT)
Dept: GENERAL RADIOLOGY | Age: 51
Discharge: HOME | End: 2021-10-30
Payer: COMMERCIAL

## 2021-10-28 ENCOUNTER — OFFICE VISIT (OUTPATIENT)
Dept: FAMILY MEDICINE CLINIC | Age: 51
End: 2021-10-28
Payer: COMMERCIAL

## 2021-10-28 ENCOUNTER — HOSPITAL ENCOUNTER (OUTPATIENT)
Age: 51
Setting detail: SPECIMEN
Discharge: HOME | End: 2021-10-28
Payer: COMMERCIAL

## 2021-10-28 VITALS
HEART RATE: 73 BPM | DIASTOLIC BLOOD PRESSURE: 76 MMHG | BODY MASS INDEX: 33.37 KG/M2 | OXYGEN SATURATION: 95 % | TEMPERATURE: 97.2 F | WEIGHT: 226 LBS | SYSTOLIC BLOOD PRESSURE: 114 MMHG

## 2021-10-28 DIAGNOSIS — K43.9 VENTRAL HERNIA WITHOUT OBSTRUCTION OR GANGRENE: ICD-10-CM

## 2021-10-28 DIAGNOSIS — R19.7 DIARRHEA, UNSPECIFIED TYPE: Primary | ICD-10-CM

## 2021-10-28 DIAGNOSIS — R19.7 DIARRHEA, UNSPECIFIED TYPE: ICD-10-CM

## 2021-10-28 PROCEDURE — 74019 RADEX ABDOMEN 2 VIEWS: CPT

## 2021-10-28 PROCEDURE — 99213 OFFICE O/P EST LOW 20 MIN: CPT | Performed by: FAMILY MEDICINE

## 2021-10-28 RX ORDER — SENNA AND DOCUSATE SODIUM 50; 8.6 MG/1; MG/1
1 TABLET, FILM COATED ORAL DAILY
Qty: 20 TABLET | Refills: 1 | Status: SHIPPED | OUTPATIENT
Start: 2021-10-28 | End: 2022-01-06

## 2021-10-28 ASSESSMENT — PATIENT HEALTH QUESTIONNAIRE - PHQ9
2. FEELING DOWN, DEPRESSED OR HOPELESS: 0
SUM OF ALL RESPONSES TO PHQ QUESTIONS 1-9: 0
1. LITTLE INTEREST OR PLEASURE IN DOING THINGS: 0
SUM OF ALL RESPONSES TO PHQ9 QUESTIONS 1 & 2: 0
SUM OF ALL RESPONSES TO PHQ QUESTIONS 1-9: 0
SUM OF ALL RESPONSES TO PHQ QUESTIONS 1-9: 0

## 2021-10-28 NOTE — PROGRESS NOTES
morning 30 minutes prior to other food, drink, or medication 90 tablet 1    Continuous Blood Gluc Sensor (DEXCOM G6 SENSOR) MISC Change every 10 days as directed for continuous glucose monitoring 9 each 3    lisinopril-hydroCHLOROthiazide (PRINZIDE;ZESTORETIC) 10-12.5 MG per tablet TAKE 1 TABLET BY MOUTH ONE TIME A DAY 90 tablet 5    gemfibrozil (LOPID) 600 MG tablet TAKE 1 TABLET BY MOUTH 2 TIMES A  tablet 5    atorvastatin (LIPITOR) 20 MG tablet TAKE 1 TABLET BY MOUTH ONE TIME A DAY 90 tablet 5    levothyroxine (SYNTHROID) 88 MCG tablet TAKE 1 TABLET BY MOUTH ONE TIME A DAY 90 tablet 5    spironolactone (ALDACTONE) 25 MG tablet TAKE 1 TABLET BY MOUTH ONE TIME A DAY 90 tablet 5    metFORMIN (GLUCOPHAGE) 1000 MG tablet TAKE 1 TABLET BY MOUTH 2 TIMES A DAY WITH MEALS 180 tablet 5    insulin glargine (LANTUS SOLOSTAR) 100 UNIT/ML injection pen Inject 50 Units into the skin 2 times daily (Patient taking differently: Inject 45 Units into the skin 2 times daily ) 15 pen 5    Continuous Blood Gluc Transmit (DEXCOM G6 TRANSMITTER) MISC Change every 3 months as directed for continuous glucose monitoring 1 each 3    insulin lispro, 1 Unit Dial, 100 UNIT/ML SOPN Inject 10 Units into the skin 3 times daily (with meals) 15 pen 5    Insulin Pen Needle (PEN NEEDLES 31GX5/16\") 31G X 8 MM MISC Use to inject insulin 5 times per day 500 each 3    Prodigy Lancets 28G MISC Use to test blood sugar up to 5 times daily 500 each 3    Multiple Vitamins-Minerals (THERAPEUTIC MULTIVITAMIN-MINERALS) tablet Take 1 tablet by mouth daily      vitamin D (CHOLECALCIFEROL) 25 MCG (1000 UT) TABS tablet Take 1,000 Units by mouth daily      vitamin B-12 (CYANOCOBALAMIN) 500 MCG tablet Take 500 mcg by mouth daily       No current facility-administered medications for this visit.      ALLERGIES:    Allergies   Allergen Reactions    Codeine     Tricor [Fenofibrate]        Social History     Tobacco Use    Smoking status: Never Smoker    Smokeless tobacco: Never Used   Substance Use Topics    Alcohol use: Yes      Body mass index is 33.37 kg/m². /76   Pulse 73   Temp 97.2 °F (36.2 °C)   Wt 226 lb (102.5 kg)   SpO2 95%   BMI 33.37 kg/m²     Subjective:      HPI    49 y.o. female coming today because of concerns about a diarrhea which started around 8 weeks ago. She tells me that she did get together for small family gathering and she did see her niece, she found out that the niece had Sylvester. She tells me that she has been going to the bathroom around 20 times a day. Very explosive diarrhea very liquid. On and off in the morning she has a very squeezing feeling in her stomach. She feels a lot of pressure in there. Not every day. She has not had a normal bowel movement in 2 to 3 months. She states that she use over-the-counter medication loperamide. But also started a probiotic yogurt no help with the discomfort and loose stool. Review of Systems   Constitutional: Negative for fever and unexpected weight change. Pertinent items are noted in HPI. Objective:   Physical Exam  Constitutional: VS (see above). General appearance: normal development, habitus and attention, no deformities. No distress. Eyes: normal conjunctiva and lids. CAV: RRR, no RMG. No edema lower extremities. Pulmo: CTA bilateral, no CWR. Skin: no rashes, lesions or ulcers. Abdomen: Bloated bowel sounds positive. No guarding rebounding. Musculoskeletal: normal gait. Nails: no clubbing or cyanosis. Psychiatric: alert and oriented to place, time and person. Normal mood and affect. Assessment:       Diagnosis Orders   1. Diarrhea, unspecified type  Gastrointestinal Panel, Molecular    XR ABDOMEN (2 VIEWS)   2. Ventral hernia without obstruction or gangrene  YANDEL Ibanez MD, Colorectal Surgery, [facility]       Plan:   Await results.   I discussed with patient if her abdomen x-ray did show some constipation she will start the Blanca-Colace. Patient also would like to have a referral to a hernia specialist which was provided. Return in about 6 months (around 4/28/2022), or if symptoms worsen or fail to improve. Orders Placed This Encounter   Procedures    Gastrointestinal Panel, Molecular     Standing Status:   Future     Standing Expiration Date:   10/28/2022    XR ABDOMEN (2 VIEWS)     Standing Status:   Future     Standing Expiration Date:   10/28/2022    YANDEL - Barb Johnson MD, Colorectal Surgery, [place]     Referral Priority:   Routine     Referral Type:   Eval and Treat     Referral Reason:   Specialty Services Required     Referred to Provider:   Beryle Harvard, MD     Requested Specialty:   Colon and Rectal Surgery     Number of Visits Requested:   1     Orders Placed This Encounter   Medications    sennosides-docusate sodium (SENOKOT-S) 8.6-50 MG tablet     Sig: Take 1 tablet by mouth daily     Dispense:  20 tablet     Refill:  1       Call or return to clinic prn if these symptoms worsen or fail to improve as anticipated. I have reviewed the instructions with the patient, answering all questions to patient's satisfaction. Baldo Molina received counseling on the following healthy behaviors: nutrition, exercise, and medication adherence  Reviewed prior labs and health maintenance. Continue current medications, diet and exercise. Discussed use, benefit, and side effects of prescribed medications. Barriers to medication compliance addressed. Patient given educational materials - see patient instructions. All patient questions answered. Patient voiced understanding.       Electronically signed by Radha Arzate MD on 10/28/2021 at 8:30 AM       (Please note that portions of this note were completed with a voice recognition program. Efforts were made to edit the dictations but occasionally words are mis-transcribed.)

## 2021-10-29 LAB
CAMPYLOBACTER PCR: NORMAL
E COLI ENTEROTOXIGENIC PCR: NORMAL
PLESIOMONAS SHIGELLOIDES PCR: NORMAL
SALMONELLA PCR: NORMAL
SHIGATOXIN GENE PCR: NORMAL
SHIGELLA SP PCR: NORMAL
SPECIMEN DESCRIPTION: NORMAL
VIBRIO PCR: NORMAL
YERSINIA ENTEROCOLITICA PCR: NORMAL

## 2021-12-02 ENCOUNTER — OFFICE VISIT (OUTPATIENT)
Dept: FAMILY MEDICINE CLINIC | Age: 51
End: 2021-12-02
Payer: COMMERCIAL

## 2021-12-02 VITALS
DIASTOLIC BLOOD PRESSURE: 73 MMHG | SYSTOLIC BLOOD PRESSURE: 111 MMHG | TEMPERATURE: 97.2 F | HEART RATE: 76 BPM | BODY MASS INDEX: 35.75 KG/M2 | WEIGHT: 227.8 LBS | HEIGHT: 67 IN | OXYGEN SATURATION: 97 %

## 2021-12-02 DIAGNOSIS — K59.1 FUNCTIONAL DIARRHEA: Primary | ICD-10-CM

## 2021-12-02 DIAGNOSIS — H61.22 EXCESSIVE CERUMEN IN LEFT EAR CANAL: ICD-10-CM

## 2021-12-02 PROCEDURE — 99213 OFFICE O/P EST LOW 20 MIN: CPT | Performed by: FAMILY MEDICINE

## 2021-12-02 RX ORDER — DICYCLOMINE HYDROCHLORIDE 10 MG/1
10 CAPSULE ORAL 4 TIMES DAILY
Qty: 120 CAPSULE | Refills: 2 | Status: SHIPPED | OUTPATIENT
Start: 2021-12-02 | End: 2022-01-06

## 2021-12-02 NOTE — PROGRESS NOTES
General FM note    Parmjit Montana is a 49 y.o. female who presents today for follow up on her  medical conditions as noted below. Parmjit Montana is c/o of   Chief Complaint   Patient presents with    Diarrhea    Ear Fullness       Patient Active Problem List:     Anxiety     Atopic dermatitis     Atopic rhinitis     Benign essential hypertension     Complicated varicose veins     Gastroesophageal reflux disease     Hyperlipidemia     Hypothyroidism     Low HDL (under 40)     Mass of left thigh     Mild intermittent asthma with acute exacerbation     Obesity (BMI 30-39. 9)     Osteochondroma of left fibula     Type 2 diabetes mellitus with hyperglycemia, with long-term current use of insulin (HCC)     Vitamin D deficiency     No past medical history on file.    Past Surgical History:   Procedure Laterality Date     SECTION       SECTION      ENDOMETRIAL ABLATION      TUBAL LIGATION      UMBILICAL HERNIA REPAIR       Family History   Problem Relation Age of Onset    Ovarian Cancer Mother     Other Father         intracranial hemorrhage     Ovarian Cancer Maternal Grandmother     Stroke Maternal Grandmother     Diabetes Maternal Grandfather     Rheum Arthritis Paternal Grandmother     Diabetes Paternal Grandfather     High Blood Pressure Paternal Grandfather     Colon Cancer Paternal Grandfather     Colon Cancer Brother     Cancer Brother         AML     Current Outpatient Medications   Medication Sig Dispense Refill    dicyclomine (BENTYL) 10 MG capsule Take 1 capsule by mouth 4 times daily 120 capsule 2    sennosides-docusate sodium (SENOKOT-S) 8.6-50 MG tablet Take 1 tablet by mouth daily 20 tablet 1    aspirin 81 MG EC tablet Take 1 tablet by mouth daily 90 tablet 1    Icosapent Ethyl (VASCEPA) 1 g CAPS capsule TAKE TWO CAPSULES BY MOUTH TWICE A  capsule 1    omeprazole (PRILOSEC) 20 MG delayed release capsule TAKE 1 CAPSULE BY MOUTH ONE TIME A DAY 90 capsule 1    Semaglutide (RYBELSUS) 14 MG TABS Take 1 tablet by mouth every morning 30 minutes prior to other food, drink, or medication 90 tablet 1    Continuous Blood Gluc Sensor (DEXCOM G6 SENSOR) MISC Change every 10 days as directed for continuous glucose monitoring 9 each 3    lisinopril-hydroCHLOROthiazide (PRINZIDE;ZESTORETIC) 10-12.5 MG per tablet TAKE 1 TABLET BY MOUTH ONE TIME A DAY 90 tablet 5    gemfibrozil (LOPID) 600 MG tablet TAKE 1 TABLET BY MOUTH 2 TIMES A  tablet 5    atorvastatin (LIPITOR) 20 MG tablet TAKE 1 TABLET BY MOUTH ONE TIME A DAY 90 tablet 5    levothyroxine (SYNTHROID) 88 MCG tablet TAKE 1 TABLET BY MOUTH ONE TIME A DAY 90 tablet 5    spironolactone (ALDACTONE) 25 MG tablet TAKE 1 TABLET BY MOUTH ONE TIME A DAY 90 tablet 5    metFORMIN (GLUCOPHAGE) 1000 MG tablet TAKE 1 TABLET BY MOUTH 2 TIMES A DAY WITH MEALS 180 tablet 5    insulin glargine (LANTUS SOLOSTAR) 100 UNIT/ML injection pen Inject 50 Units into the skin 2 times daily (Patient taking differently: Inject 45 Units into the skin 2 times daily ) 15 pen 5    Continuous Blood Gluc Transmit (DEXCOM G6 TRANSMITTER) MISC Change every 3 months as directed for continuous glucose monitoring 1 each 3    insulin lispro, 1 Unit Dial, 100 UNIT/ML SOPN Inject 10 Units into the skin 3 times daily (with meals) 15 pen 5    Insulin Pen Needle (PEN NEEDLES 31GX5/16\") 31G X 8 MM MISC Use to inject insulin 5 times per day 500 each 3    Prodigy Lancets 28G MISC Use to test blood sugar up to 5 times daily 500 each 3    Multiple Vitamins-Minerals (THERAPEUTIC MULTIVITAMIN-MINERALS) tablet Take 1 tablet by mouth daily      vitamin D (CHOLECALCIFEROL) 25 MCG (1000 UT) TABS tablet Take 1,000 Units by mouth daily      vitamin B-12 (CYANOCOBALAMIN) 500 MCG tablet Take 500 mcg by mouth daily       No current facility-administered medications for this visit.      ALLERGIES:    Allergies   Allergen Reactions    Codeine     Tricor [Fenofibrate]        Social History     Tobacco Use    Smoking status: Never Smoker    Smokeless tobacco: Never Used   Substance Use Topics    Alcohol use: Yes      Body mass index is 35.68 kg/m². /73   Pulse 76   Temp 97.2 °F (36.2 °C)   Ht 5' 7\" (1.702 m)   Wt 227 lb 12.8 oz (103.3 kg)   SpO2 97%   BMI 35.68 kg/m²     Subjective:      HPI    49 y.o. female coming today for follow-up. She states that she still has uncontrolled diarrhea at least up to 4 times a day. Even this morning she had to go to the bathroom already. She states then her stool smells like plastic looks like shredded things. She never had this before but she tells me that she did have a \"flu \"in September 2021. She was exposed to Covid she did not get tested for Covid. She had Covid in the beginning of this year. Patient is fully vaccinated. But she states since then she has been having this bloating and diarrhea. She works as a nurse practitioner in palliative care and she feels very bad for patients if she has to get up suddenly to go to the bathroom. She also states that she still cannot hear out of her left ear. An ear flush was done in October here in the office. Review of Systems   Constitutional: Negative for fever and unexpected weight change. Pertinent items are noted in HPI. Objective:   Physical Exam  Constitutional: VS (see above). General appearance: normal development, habitus and attention, no deformities. No distress. Eyes: normal conjunctiva and lids. CAV: RRR, no RMG. No edema lower extremities. Pulmo: CTA bilateral, no CWR. Abdomen: Bowel sounds very active. Somewhat bloated. No guarding rebounding. Skin: no rashes, lesions or ulcers. Musculoskeletal: normal gait. Nails: no clubbing or cyanosis. Psychiatric: alert and oriented to place, time and person. Normal mood and affect. Assessment:       Diagnosis Orders   1.  Functional diarrhea  AFL - Rowena Shields DO, Gastroenterology, [place]    dicyclomine (BENTYL) 10 MG capsule   2. Excessive cerumen in left ear canal         Plan:   The infectious work-up was negative. X-ray was negative. Patient will see a gastroenterologist.  I discussed with her that she might have had in September breakthrough COVID-19 infection. This infection could affect your bowel movements. I will give her Bentyl for possible IBS with diarrhea. Again appreciate the help of the gastroenterologist.  The ear was flushed again a large amount of wax was removed. Patient was able to hear. She will continue to follow-up with her pharmacist for diabetes. She will have an A1c done in March. Return in about 6 months (around 6/2/2022), or if symptoms worsen or fail to improve. Orders Placed This Encounter   Procedures    YANDEL - Abbe Hahn DO, Gastroenterology, [facility]     Referral Priority:   Routine     Referral Type:   Eval and Treat     Referral Reason:   Specialty Services Required     Referred to Provider:   Cong Wilder DO     Requested Specialty:   Gastroenterology     Number of Visits Requested:   1     Orders Placed This Encounter   Medications    dicyclomine (BENTYL) 10 MG capsule     Sig: Take 1 capsule by mouth 4 times daily     Dispense:  120 capsule     Refill:  2       Call or return to clinic prn if these symptoms worsen or fail to improve as anticipated. I have reviewed the instructions with the patient, answering all questions to patient's satisfaction. Deidre Bowman received counseling on the following healthy behaviors: nutrition, exercise, and medication adherence  Reviewed prior labs and health maintenance. Continue current medications, diet and exercise. Discussed use, benefit, and side effects of prescribed medications. Barriers to medication compliance addressed. Patient given educational materials - see patient instructions. All patient questions answered. Patient voiced understanding.       Electronically signed by Shavonne Mai Dolly Juarez MD on 12/2/2021 at 8:13 AM       (Please note that portions of this note were completed with a voice recognition program. Efforts were made to edit the dictations but occasionally words are mis-transcribed.)

## 2021-12-07 ENCOUNTER — CLINICAL DOCUMENTATION (OUTPATIENT)
Dept: PHARMACY | Facility: CLINIC | Age: 51
End: 2021-12-07

## 2021-12-07 NOTE — PROGRESS NOTES
Pharmacy Pop Care Documentation:     AVS received for required office visit on: 12/2/21: Delfin Moreno per charted OV where DM was discussed.

## 2021-12-10 ENCOUNTER — PATIENT MESSAGE (OUTPATIENT)
Dept: FAMILY MEDICINE CLINIC | Age: 51
End: 2021-12-10

## 2021-12-10 RX ORDER — INSULIN GLARGINE 100 [IU]/ML
50 INJECTION, SOLUTION SUBCUTANEOUS 2 TIMES DAILY
Qty: 15 PEN | Refills: 5 | Status: SHIPPED | OUTPATIENT
Start: 2021-12-10 | End: 2022-04-19 | Stop reason: SDUPTHER

## 2021-12-10 RX ORDER — OMEPRAZOLE 20 MG/1
CAPSULE, DELAYED RELEASE ORAL
Qty: 90 CAPSULE | Refills: 1 | Status: SHIPPED | OUTPATIENT
Start: 2021-12-10 | End: 2022-04-19 | Stop reason: SDUPTHER

## 2021-12-10 RX ORDER — ASPIRIN 81 MG/1
81 TABLET ORAL DAILY
Qty: 90 TABLET | Refills: 1 | Status: SHIPPED | OUTPATIENT
Start: 2021-12-10 | End: 2022-04-19 | Stop reason: SDUPTHER

## 2021-12-10 NOTE — TELEPHONE ENCOUNTER
From: Benito Banerjee  To: Dr. Angela Scott  Sent: 12/10/2021 6:39 AM EST  Subject: Medications need refilled    Can you call the following prescriptions into [facility] pharmacy   Lantus 50 units twice daily 90 day supply    Prilosec 20 mg daily 90 day supply    Aspirin 81 mg daily 90 day supply    Thank you   Denny Bejarano

## 2021-12-14 ENCOUNTER — TELEPHONE (OUTPATIENT)
Dept: PHARMACY | Age: 51
End: 2021-12-14

## 2021-12-27 ENCOUNTER — HOSPITAL ENCOUNTER (OUTPATIENT)
Age: 51
Discharge: HOME | End: 2021-12-27
Payer: COMMERCIAL

## 2021-12-27 LAB — IGA: 273 MG/DL (ref 70–400)

## 2021-12-27 PROCEDURE — 83993 ASSAY FOR CALPROTECTIN FECAL: CPT

## 2021-12-27 PROCEDURE — 83516 IMMUNOASSAY NONANTIBODY: CPT

## 2021-12-27 PROCEDURE — 83520 IMMUNOASSAY QUANT NOS NONAB: CPT

## 2021-12-27 PROCEDURE — 87506 IADNA-DNA/RNA PROBE TQ 6-11: CPT

## 2021-12-27 PROCEDURE — 87324 CLOSTRIDIUM AG IA: CPT

## 2021-12-27 PROCEDURE — 36415 COLL VENOUS BLD VENIPUNCTURE: CPT

## 2021-12-27 PROCEDURE — 87449 NOS EACH ORGANISM AG IA: CPT

## 2021-12-27 PROCEDURE — 82784 ASSAY IGA/IGD/IGG/IGM EACH: CPT

## 2021-12-28 LAB
C DIFF AG + TOXIN: NEGATIVE
CAMPYLOBACTER PCR: NORMAL
E COLI ENTEROTOXIGENIC PCR: NORMAL
PLESIOMONAS SHIGELLOIDES PCR: NORMAL
SALMONELLA PCR: NORMAL
SHIGATOXIN GENE PCR: NORMAL
SHIGELLA SP PCR: NORMAL
SPECIMEN DESCRIPTION: NORMAL
SPECIMEN DESCRIPTION: NORMAL
TISSUE TRANSGLUTAMINASE ANTIBODY IGG: <0.6 U/ML
TISSUE TRANSGLUTAMINASE IGA: 0.8 U/ML
VIBRIO PCR: NORMAL
YERSINIA ENTEROCOLITICA PCR: NORMAL

## 2021-12-29 LAB — FECAL PANCREATIC ELASTASE-1: >800 UG/G

## 2021-12-30 ENCOUNTER — TELEPHONE (OUTPATIENT)
Dept: PHARMACY | Facility: CLINIC | Age: 51
End: 2021-12-30

## 2021-12-30 LAB — CALPROTECTIN, FECAL: 63 UG/G

## 2021-12-30 NOTE — TELEPHONE ENCOUNTER
2022 Annual Pharmacist Visit    Called patient to schedule 2022 yearly pharmacist appointment to discuss medications for Diabetes Management Program.    Spoke to patient and appointment scheduled for   Thursday Jan 6, 2022   Appt at 7:30 AM     [facility] [facility]   Department, toll free: [phone] Option #3      For Pharmacy [address] in place:  No   Recommendation Provided To: Patient/Caregiver: 1 via Telephone   Intervention Detail: Scheduled Appointment   Gap Closed?: Yes    Intervention Accepted By: Patient/Caregiver: 1   Time Spent (min): 10

## 2022-01-06 ENCOUNTER — SCHEDULED TELEPHONE ENCOUNTER (OUTPATIENT)
Dept: PHARMACY | Facility: CLINIC | Age: 52
End: 2022-01-06

## 2022-01-06 NOTE — TELEPHONE ENCOUNTER
[facility] HE[facility] CLINICAL PHARMACY REVIEW - Be Well with Diabetes    Karena Giles is a 49 y.o. female enrolled in the [facility] [employer] [facility] Diabetes Program. Patient provided Anival March with verbal consent to remain in the program for this year. Patient enrolled 2020.     Medications:  Current Outpatient Medications   Medication Sig Dispense Refill    aspirin 81 MG EC tablet Take 1 tablet by mouth daily 90 tablet 1    omeprazole (PRILOSEC) 20 MG delayed release capsule TAKE 1 CAPSULE BY MOUTH ONE TIME A DAY 90 capsule 1    insulin glargine (LANTUS SOLOSTAR) 100 UNIT/ML injection pen Inject 50 Units into the skin 2 times daily 15 pen 5    dicyclomine (BENTYL) 10 MG capsule    *sts didn't take, colonoscopy tomorrow to assess sx she's having - removed from medication list   Take 1 capsule by mouth 4 times daily 120 capsule 2    sennosides-docusate sodium (SENOKOT-S) 8.6-50 MG tablet    *sts not taking, colonoscopy tomorrow to assess sx she's having - removed from medication list   Take 1 tablet by mouth daily 20 tablet 1    Icosapent Ethyl (VASCEPA) 1 g CAPS capsule TAKE TWO CAPSULES BY MOUTH TWICE A  capsule 1    Semaglutide (RYBELSUS) 14 MG TABS Take 1 tablet by mouth every morning 30 minutes prior to other food, drink, or medication 90 tablet 1    Continuous Blood Gluc Sensor (DEXCOM G6 SENSOR) MISC Change every 10 days as directed for continuous glucose monitoring 9 each 3    lisinopril-hydroCHLOROthiazide (PRINZIDE;ZESTORETIC) 10-12.5 MG per tablet TAKE 1 TABLET BY MOUTH ONE TIME A DAY 90 tablet 5    gemfibrozil (LOPID) 600 MG tablet TAKE 1 TABLET BY MOUTH 2 TIMES A  tablet 5    atorvastatin (LIPITOR) 20 MG tablet TAKE 1 TABLET BY MOUTH ONE TIME A DAY 90 tablet 5    levothyroxine (SYNTHROID) 88 MCG tablet TAKE 1 TABLET BY MOUTH ONE TIME A DAY 90 tablet 5    spironolactone (ALDACTONE) 25 MG tablet TAKE 1 TABLET BY MOUTH ONE TIME A DAY 90 tablet 5    metFORMIN (GLUCOPHAGE) ASCVD risk score (Lorna Celis, et al., 2013) is: 3.2%    Values used to calculate the score:      Age: 49 years      Sex: Female      Is Non- : No      Diabetic: Yes      Tobacco smoker: No      Systolic Blood Pressure: 939 mmHg      Is BP treated: Yes      HDL Cholesterol: 28 mg/dL      Total Cholesterol: 132 mg/dL     Lab Results   Component Value Date    CREATININE 0.59 01/06/2021     Immunizations:  Immunization History   Administered Date(s) Administered    COVID-19, Pfizer, PF, 30mcg/0.3mL 12/18/2020, 01/08/2021, 10/05/2021    DT (pediatric) 01/01/1998    Hepatitis B 02/08/1993    Influenza A ([id number]) Vaccine PF IM 10/28/2009    Influenza Virus Vaccine 12/10/2007, 01/15/2010, 10/27/2015, 10/08/2016, 10/14/2020    Influenza, MDCK Quadv, IM, PF (Flucelvax 2 yrs and older) 01/08/2020    Influenza, Quadv, IM, PF (6 mo and older Fluzone, Flulaval, Fluarix, and 3 yrs and older Afluria) 11/20/2017, 02/01/2019    Pneumococcal Polysaccharide ([id number]) 11/20/2015    Tdap (Boostrix, Adacel) 01/15/2009, 02/26/2020      Social History:  Social History     Tobacco Use    Smoking status: Never Smoker    Smokeless tobacco: Never Used   Substance Use Topics    Alcohol use: Yes     ASSESSMENT:  Initial Program Requirements (Y indicates has completed for the year, N indicates needs to be completed by 07/01/2022): No - Provider Visit for DM (1st)  No - A1c (1st)     Ongoing Program Requirements (Y indicates has completed for the year, N indicates needs to be completed by 12/31/2022):   No - Provider Visit for DM (2nd)  No - ACC/diabetes educator visit (if A1c over 8%) - does follow with [place]'s Medication Management  No - A1c (2nd)  No - Lipid panel  No - Urine microalbumin  Yes - Pneumococcal vaccination: up to date  No - Influenza vaccination for Fall 2022  No - Medication adherence over 70%  Yes - On statin or contraindication(s) - prescribed atorvastatin  Yes - On ACEi/ARB or contraindication(s) - prescribed lisinopril-HCTZ     Current medications eligible for copay waiver, up to $600, through [address]:  - aspirin (with prescription), atorvastatin, gemfibrozil, Humalog, Lantus, levothyroxine, lisinopril-hydrochlorothiazide, metformin, Rybelsus, spironolactone  - Dexcom G6, Freestyle Jamie (with prior authorization)  - Generic pen needles     Diabetes Care:   - Glycemic Goal: <7.0% and directed by provider. Type 2 DM under acceptable control as evidenced by A1cs @6.3-6.9. Taking and tolerating metformin 1000mg BID, Rybelsus 14mg daily and Lantus 50u BID; reports very infrequent use of Humalog.  - Home blood sugar records:  uses and very happy with DexcomG6, however cost kept her from refilling in December, so has been using Prodigy currently,. States had run out of her $600 program benefit and her FSA money, so plans refill DexcomG6 soon to get restarted. - Eye exam current (within one year): no   - Foot exam current (within one year): no  - Medication compliance: compliant most of the time (confirmed with [employer] refill hx)  - Diet compliance: reports has been a challenge lately with many life happenings (currently between 2 houses; hoping this will lessen soon, because just listed the 1 house). Patient talks about how she recognizes day to day choice in her lifestyle/diet choices. Other Considerations:  - Blood Pressure Goal: BP less than 140/90 mmHg due to history of DM: Is at blood pressure goal.   - Lipids: taking atorvastatin 20mg daily, gemfibrozil 600mg BID (prev intolerance to fenofibrate), and Vascepa 2g BID. Denies any ADRs. LDL <70; hx elevated TGs.  Continue to monitor.  - Smoking status: Never smoked    PLAN:  - DM program gaps identified:   · Initial requirements: Provider Visit for DM (1st) and A1c (1st)   · Ongoing requirements: Provider visit for DM (2nd), ACC/diabetes educator visit (if A1c over 8%), A1c (2nd), Lipid panel, Urine microalbumin, Influenza vaccination for [id number] and Medication adherence over 70%   - Education to patient: @25 min on phone with patient  · Reschedule appointment in [facility]; patient states she's planning to do  (states has had to cancel because of work / staffing shortages)  ·  Reminded to get yearly retinal exam - reports needs to do, got behind with COVID, self-goal is to complete before Feb  · Exceeded $600 program benefit by @May 2021  · Discussed DexcomG6 and Freestyle Libre2 and potential cost differences (@$235 per 3-months of Dexcom sensors & transmitter v @$130 per 3-months of Jamie sensors)  · Patient very happy with Dexcom and reports previously tried [place] 14-day and had troubles with sensors adhering, also likes the ability of the alerts and kenisha with Dexcom. Discussed the differences with Leonora Betancourtting as a CGM vs previous Arynga system and the kenisha available. Patient uncertain at this time, but will think about and also may further discuss with Mirna at follow-up. 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   · Discussed she could also inquire re DME cost of Luc Chamorro with [facility] or call [facility] Medical [facility]: #[phone]; can use as rx part of year and medical benefit part of year    Mikayla Finney, PharmD, [facility]  Department, toll free: [phone], option 3     =======================================================   For Pharmacy Admin Tracking Only     CPA in place:  No   Recommendation Provided To: Patient/Caregiver: 2 via Telephone   Gap Closed?: Yes    Intervention Accepted By: Patient/Caregiver: 1   Time Spent (min): 40

## 2022-01-26 RX ORDER — BLOOD-GLUCOSE TRANSMITTER
EACH MISCELLANEOUS
Qty: 1 EACH | Refills: 3 | Status: SHIPPED | OUTPATIENT
Start: 2022-01-26 | End: 2022-10-27

## 2022-02-22 ENCOUNTER — HOSPITAL ENCOUNTER (OUTPATIENT)
Dept: PHARMACY | Age: 52
Setting detail: THERAPIES SERIES
Discharge: HOME | End: 2022-02-22
Payer: COMMERCIAL

## 2022-02-22 VITALS
DIASTOLIC BLOOD PRESSURE: 74 MMHG | WEIGHT: 226.8 LBS | OXYGEN SATURATION: 97 % | BODY MASS INDEX: 35.52 KG/M2 | TEMPERATURE: 97.3 F | SYSTOLIC BLOOD PRESSURE: 106 MMHG | HEART RATE: 81 BPM

## 2022-02-22 PROCEDURE — 99211 OFF/OP EST MAY X REQ PHY/QHP: CPT

## 2022-02-22 RX ORDER — BUDESONIDE 3 MG/1
6 CAPSULE, COATED PELLETS ORAL EVERY EVENING
COMMUNITY
Start: 2022-02-01 | End: 2022-09-20 | Stop reason: ALTCHOICE

## 2022-02-22 NOTE — PROGRESS NOTES
Diabetic Medication Management Program  [facility] - INPATIENT  [address] [facility]. [place], [address] [facility]  Phone: [phone]  Fax: [phone]    NAME: Shalonda Barajas RECORD NUMBER:  [id number]  AGE: 49 y.o. GENDER: female  : 1970  EPISODE DATE:  2022     Ms. Evans Beaulieu was referred to [facility] by Dr. Leonel Ariza. Patient acknowledges working in consult agreement with clinical pharmacist and this provider. Goals per referral:   Fasting blood glucose: < 130  Peak postprandial glucose: < 180  A1C: < 7    SUBJECTIVE     Ms. Evans Beaulieu is a 49 y.o. female here for the Diabetes Service for self-management education, medication review including over the counter medications and herbal products, overall wellbeing assessment, transition of care and any needed adjustments with updates and recommendations communicated to the referring physician. Patient Findings:     Medication changes: Not taking Humalog - Taking 50 units twice daily. Diet changes  Yes: patient stopped her healthy eating habits around the holidays when she had GI issues. She was suffering from bad diarrhea and ended up with diagnosis that has required oral Entocort therapy. She is on this now and will be reaching treatment end soon. Activity changes  Yes: Just moved so has been doing more than usual in that regard. She walks her dog and plans to visit the park more once the weather improves. She does take the stairs at work and mora farther away in parking lots. Emergency Room Visit or Hospitalization  no  Acute Illness/new problems  Yes: GI issue noted above. Resolving. Symptoms of hypoglycemia  no - none  Symptoms of hyperglycemia  No - none  Medication adverse reactions  none    OBJECTIVE     PMHx:  No past medical history on file.     Social History:    Social History     Tobacco Use    Smoking status: Never Smoker    Smokeless tobacco: Never Used   Substance Use Topics  Alcohol use: Yes     Pertinent Labs:    Lab Results   Component Value Date    LABA1C 6.9 (H) 09/15/2021    LABA1C 6.3 09/08/2021    LABA1C 6.4 05/04/2021     Lab Results   Component Value Date    CHOL 132 09/15/2021    TRIG 225 (H) 09/15/2021    HDL 28 (L) 09/15/2021     Lab Results   Component Value Date    CREATININE 0.59 01/06/2021    BUN 14 01/06/2021     01/06/2021    K 3.3 (L) 01/06/2021     01/06/2021    CO2 26 01/06/2021     Lab Results   Component Value Date    ALT 18 01/06/2021     Weight:  Wt Readings from Last 3 Encounters:   02/22/22 226 lb 12.8 oz (102.9 kg)   12/02/21 227 lb 12.8 oz (103.3 kg)   10/28/21 226 lb (102.5 kg)     Blood Pressure:  BP Readings from Last 3 Encounters:   02/22/22 106/74   12/02/21 111/73   10/28/21 114/76     Current medications:    Current Outpatient Medications:     Continuous Blood Gluc Transmit (DEXCOM G6 TRANSMITTER) MISC, Change every 3 months as directed for continuous glucose monitoring, Disp: 1 each, Rfl: 3    Insulin Pen Needle 29G X 12MM MISC, 1 each by Does not apply route 3 times daily, Disp: , Rfl:     aspirin 81 MG EC tablet, Take 1 tablet by mouth daily, Disp: 90 tablet, Rfl: 1    omeprazole (PRILOSEC) 20 MG delayed release capsule, TAKE 1 CAPSULE BY MOUTH ONE TIME A DAY, Disp: 90 capsule, Rfl: 1    insulin glargine (LANTUS SOLOSTAR) 100 UNIT/ML injection pen, Inject 50 Units into the skin 2 times daily, Disp: 15 pen, Rfl: 5    Icosapent Ethyl (VASCEPA) 1 g CAPS capsule, TAKE TWO CAPSULES BY MOUTH TWICE A DAY, Disp: 360 capsule, Rfl: 1    Semaglutide (RYBELSUS) 14 MG TABS, Take 1 tablet by mouth every morning 30 minutes prior to other food, drink, or medication, Disp: 90 tablet, Rfl: 1    Continuous Blood Gluc Sensor (DEXCOM G6 SENSOR) MISC, Change every 10 days as directed for continuous glucose monitoring (Patient not taking: Reported on 1/6/2022), Disp: 9 each, Rfl: 3    lisinopril-hydroCHLOROthiazide (PRINZIDE;ZESTORETIC) 10-12.5 MG per tablet, TAKE 1 TABLET BY MOUTH ONE TIME A DAY, Disp: 90 tablet, Rfl: 5    gemfibrozil (LOPID) 600 MG tablet, TAKE 1 TABLET BY MOUTH 2 TIMES A DAY, Disp: 180 tablet, Rfl: 5    atorvastatin (LIPITOR) 20 MG tablet, TAKE 1 TABLET BY MOUTH ONE TIME A DAY, Disp: 90 tablet, Rfl: 5    levothyroxine (SYNTHROID) 88 MCG tablet, TAKE 1 TABLET BY MOUTH ONE TIME A DAY, Disp: 90 tablet, Rfl: 5    spironolactone (ALDACTONE) 25 MG tablet, TAKE 1 TABLET BY MOUTH ONE TIME A DAY, Disp: 90 tablet, Rfl: 5    metFORMIN (GLUCOPHAGE) 1000 MG tablet, TAKE 1 TABLET BY MOUTH 2 TIMES A DAY WITH MEALS, Disp: 180 tablet, Rfl: 5    insulin lispro, 1 Unit Dial, 100 UNIT/ML SOPN, Inject 10 Units into the skin 3 times daily (with meals), Disp: 15 pen, Rfl: 5    Prodigy Lancets 28G MISC, Use to test blood sugar up to 5 times daily, Disp: 500 each, Rfl: 3    Multiple Vitamins-Minerals (THERAPEUTIC MULTIVITAMIN-MINERALS) tablet, Take 1 tablet by mouth daily, Disp: , Rfl:     vitamin D (CHOLECALCIFEROL) 25 MCG (1000 UT) TABS tablet, Take 1,000 Units by mouth daily, Disp: , Rfl:     vitamin B-12 (CYANOCOBALAMIN) 500 MCG tablet, Take 500 mcg by mouth daily, Disp: , Rfl:     Immunizations:   Most Recent Immunizations   Administered Date(s) Administered    COVID-19, Pfizer Purple top, DILUTE for use, 12+ yrs, 30mcg/0.3mL dose 10/05/2021    DT (pediatric) 01/01/1998    Hepatitis B 02/08/1993    Influenza A ([id number]) Vaccine PF IM 10/28/2009    Influenza Virus Vaccine 10/14/2020    Influenza, MDCK Quadv, IM, PF (Flucelvax 2 yrs and older) 01/08/2020    Influenza, Quadv, IM, PF (6 mo and older Fluzone, Flulaval, Fluarix, and 3 yrs and older Afluria) 02/01/2019    Pneumococcal Polysaccharide ([id number]) 11/20/2015    Tdap (Boostrix, Adacel) 02/26/2020     Home Blood Glucose Results: Patient just resumed Dexcom - Was lost during her move. Did not use finger poke meter in the interim.      Blood glucose trends noted: Not applicable    ASSESSMENT     Recent A1c:     Lab Results   Component Value Date    LABA1C 6.9 (H) 09/15/2021    LABA1C 6.3 2021    LABA1C 6.4 2021      Eating patterns: Staying away from junk. Some bad days. Hasn't been logging. Exercise patterns: Moving homes which was increased physical activity. Blood Glucose Testing: Patient is currently using CGM - has not been using Dexcom because she lost it during her move but has found it and applied just yesterday. She states she did not test her BG with her previous meter while being off of CGM. She plans to get back on track with monitoring moving forward. Current Diabetic Medications: Lantus 50 U BID (patient increased up from 40 back when she started straying from her healthy dietary habits)  Patient has not been using sliding scale insulin due to the fact she has not been testing her BG. Diabetic Regimen/Compliance: Compliant except for sliding scale     Other Medication Compliance: Compliant      PLAN   Diabetic Action Plan is shown below. Patient and provider worked together to create goals which patient will work toward prior to the next appointment.         Physician Follow-up:  As scheduled    Medication Management Follow-up:   Diabetes Service  3 weeks    Electronically signed by [facility] on 2022 at 7:41 AM    For Pharmacy Admin Tracking Only     Intervention Detail: Adherence Monitorin   Total # of Interventions Recommended: 1   Total # of Interventions Accepted: 1   Time Spent (min): 60

## 2022-03-15 ENCOUNTER — HOSPITAL ENCOUNTER (OUTPATIENT)
Dept: PHARMACY | Age: 52
Setting detail: THERAPIES SERIES
Discharge: HOME | End: 2022-03-15
Payer: COMMERCIAL

## 2022-03-15 VITALS
OXYGEN SATURATION: 98 % | TEMPERATURE: 97.5 F | SYSTOLIC BLOOD PRESSURE: 111 MMHG | WEIGHT: 228.2 LBS | DIASTOLIC BLOOD PRESSURE: 72 MMHG | HEART RATE: 74 BPM | BODY MASS INDEX: 35.74 KG/M2

## 2022-03-15 LAB — HBA1C MFR BLD: 9 %

## 2022-03-15 PROCEDURE — 83036 HEMOGLOBIN GLYCOSYLATED A1C: CPT

## 2022-03-15 PROCEDURE — 99212 OFFICE O/P EST SF 10 MIN: CPT

## 2022-03-15 NOTE — PROGRESS NOTES
Diabetic Medication Management Program  [address] [place] [address]. [address]  Phone: [phone]  Fax: [phone]    NAME: Jason Simons RECORD NUMBER:  [id number]  AGE: 49 y.o. GENDER: female  : 1970  EPISODE DATE:  3/15/2022     Ms. Nelida White was referred to [facility] by Dr. Shamika Perdue. Patient acknowledges working in consult agreement with clinical pharmacist and this provider. Goals per referral:   Fasting blood glucose: < 130  Peak postprandial glucose: < 180  A1C: < 7    SUBJECTIVE     Ms. Nelida White is a 49 y.o. female here for the Diabetes Service for self-management education, medication review including over the counter medications and herbal products, overall wellbeing assessment, transition of care and any needed adjustments with updates and recommendations communicated to the referring physician. Patient Findings:     Medication changes  Yes: Patient has not been taking fast acting insulin prior to meals. She has been taking 10 units before bed. Educated importance of fast acting insulin timing. Diet changes  Yes: Patient has been trying to make better choices but has not been tracking carbs to keep under goals. She is aware that she had \"fallen off the wagon\" for a period of time especially during illness with gastritis  Activity changes  Patient hopes to increase walking as the weather gets better  Emergency Room Visit or Hospitalization  no  Acute Illness/new problems  no  Symptoms of hypoglycemia  no - none  Symptoms of hyperglycemia  no - none  Medication adverse reactions  none    OBJECTIVE     PMHx:  No past medical history on file.     Social History:    Social History     Tobacco Use    Smoking status: Never Smoker    Smokeless tobacco: Never Used   Substance Use Topics    Alcohol use: Yes     Pertinent Labs:    Lab Results   Component Value Date    LABA1C 9.0 03/15/2022    LABA1C 6.9 (H) 09/15/2021    LABA1C 6.3 09/08/2021     Lab Results   Component Value Date    CHOL 132 09/15/2021    TRIG 225 (H) 09/15/2021    HDL 28 (L) 09/15/2021     Lab Results   Component Value Date    CREATININE 0.59 01/06/2021    BUN 14 01/06/2021     01/06/2021    K 3.3 (L) 01/06/2021     01/06/2021    CO2 26 01/06/2021     Lab Results   Component Value Date    ALT 18 01/06/2021     Weight:  Wt Readings from Last 3 Encounters:   03/15/22 228 lb 3.2 oz (103.5 kg)   02/22/22 226 lb 12.8 oz (102.9 kg)   12/02/21 227 lb 12.8 oz (103.3 kg)     Blood Pressure:  BP Readings from Last 3 Encounters:   03/15/22 111/72   02/22/22 106/74   12/02/21 111/73     Current medications:    Current Outpatient Medications:     budesonide (ENTOCORT EC) 3 MG extended release capsule, , Disp: , Rfl:     Continuous Blood Gluc Transmit (DEXCOM G6 TRANSMITTER) MISC, Change every 3 months as directed for continuous glucose monitoring, Disp: 1 each, Rfl: 3    Insulin Pen Needle 29G X 12MM MISC, 1 each by Does not apply route 3 times daily, Disp: , Rfl:     aspirin 81 MG EC tablet, Take 1 tablet by mouth daily, Disp: 90 tablet, Rfl: 1    omeprazole (PRILOSEC) 20 MG delayed release capsule, TAKE 1 CAPSULE BY MOUTH ONE TIME A DAY, Disp: 90 capsule, Rfl: 1    insulin glargine (LANTUS SOLOSTAR) 100 UNIT/ML injection pen, Inject 50 Units into the skin 2 times daily, Disp: 15 pen, Rfl: 5    Icosapent Ethyl (VASCEPA) 1 g CAPS capsule, TAKE TWO CAPSULES BY MOUTH TWICE A DAY, Disp: 360 capsule, Rfl: 1    Semaglutide (RYBELSUS) 14 MG TABS, Take 1 tablet by mouth every morning 30 minutes prior to other food, drink, or medication, Disp: 90 tablet, Rfl: 1    Continuous Blood Gluc Sensor (DEXCOM G6 SENSOR) MISC, Change every 10 days as directed for continuous glucose monitoring, Disp: 9 each, Rfl: 3    lisinopril-hydroCHLOROthiazide (PRINZIDE;ZESTORETIC) 10-12.5 MG per tablet, TAKE 1 TABLET BY MOUTH ONE TIME A DAY, Disp: 90 tablet, Rfl: 5   gemfibrozil (LOPID) 600 MG tablet, TAKE 1 TABLET BY MOUTH 2 TIMES A DAY, Disp: 180 tablet, Rfl: 5    atorvastatin (LIPITOR) 20 MG tablet, TAKE 1 TABLET BY MOUTH ONE TIME A DAY, Disp: 90 tablet, Rfl: 5    levothyroxine (SYNTHROID) 88 MCG tablet, TAKE 1 TABLET BY MOUTH ONE TIME A DAY, Disp: 90 tablet, Rfl: 5    spironolactone (ALDACTONE) 25 MG tablet, TAKE 1 TABLET BY MOUTH ONE TIME A DAY, Disp: 90 tablet, Rfl: 5    metFORMIN (GLUCOPHAGE) 1000 MG tablet, TAKE 1 TABLET BY MOUTH 2 TIMES A DAY WITH MEALS, Disp: 180 tablet, Rfl: 5    insulin lispro, 1 Unit Dial, 100 UNIT/ML SOPN, Inject 10 Units into the skin 3 times daily (with meals), Disp: 15 pen, Rfl: 5    Prodigy Lancets 28G MISC, Use to test blood sugar up to 5 times daily, Disp: 500 each, Rfl: 3    Multiple Vitamins-Minerals (THERAPEUTIC MULTIVITAMIN-MINERALS) tablet, Take 1 tablet by mouth daily, Disp: , Rfl:     vitamin D (CHOLECALCIFEROL) 25 MCG (1000 UT) TABS tablet, Take 1,000 Units by mouth daily, Disp: , Rfl:     vitamin B-12 (CYANOCOBALAMIN) 500 MCG tablet, Take 500 mcg by mouth daily, Disp: , Rfl:     Immunizations:   Most Recent Immunizations   Administered Date(s) Administered    COVID-19, Pfizer Purple top, DILUTE for use, 12+ yrs, 30mcg/0.3mL dose 10/05/2021    DT (pediatric) 01/01/1998    Hepatitis B 02/08/1993    Influenza A ([id number]) Vaccine PF IM 10/28/2009    Influenza Virus Vaccine 10/14/2020    Influenza, MDCK Quadv, IM, PF (Flucelvax 2 yrs and older) 01/08/2020    Influenza, Quadv, IM, PF (6 mo and older Fluzone, Flulaval, Fluarix, and 3 yrs and older Afluria) 02/01/2019    Pneumococcal Polysaccharide ([id number]) 11/20/2015    Tdap (Boostrix, Adacel) 02/26/2020       Home Blood Glucose Results: See below                  Blood glucose trends noted: Patient was not checking bg at all for a period of time but is back using her Dexcom now       ASSESSMENT     Recent A1c: 9.0 today.     Lab Results   Component Value Date LABA1C 9.0 03/15/2022    LABA1C 6.9 (H) 09/15/2021    LABA1C 6.3 2021        Eating patterns: Patient is back to writing down what she eats. This method has helped her in the past with holding herself accountable. Suggested My Fitness Pal as an kenisha to try as well. Exercise patterns: Walking at work. Patient plans to get to the park that is near her new house to walk her dog but has not gone yet. Advised she schedule this time like an appointment to be sure to get steps in. Blood Glucose Testing: Patient is currently using CGM    Current Diabetic Medications: Lantus, Humalog, Metformin, Rybelsus     Diabetic Regimen/Compliance: Patient states she has been taking Lantus 50U BID and Humalog 10 U before bed instead of prior to meals. Other Medication Compliance: Compliant with medication. PLAN   Diabetic Action Plan is shown below. Patient and provider worked together to create goals which patient will work toward prior to the next appointment.         Physician Follow-up:  As scheduled    Medication Management Follow-up:   Diabetes Service  2 weeks to assess changes to diet and meal time insulin    Electronically signed by Gely Reyes [facility] on 3/15/2022 at 7:32 AM    For Pharmacy Admin Tracking Only     Intervention Detail: Adherence Monitorin and Dose Adjustment: 1, reason: Therapy Optimization   Total # of Interventions Recommended: 2   Total # of Interventions Accepted: 2   Time Spent (min): 60

## 2022-04-07 ENCOUNTER — HOSPITAL ENCOUNTER (OUTPATIENT)
Dept: PHARMACY | Age: 52
Setting detail: THERAPIES SERIES
Discharge: HOME | End: 2022-04-07
Payer: COMMERCIAL

## 2022-04-07 VITALS
BODY MASS INDEX: 35.37 KG/M2 | TEMPERATURE: 96.9 F | OXYGEN SATURATION: 98 % | WEIGHT: 225.8 LBS | HEART RATE: 72 BPM | DIASTOLIC BLOOD PRESSURE: 73 MMHG | SYSTOLIC BLOOD PRESSURE: 107 MMHG

## 2022-04-07 PROCEDURE — 99212 OFFICE O/P EST SF 10 MIN: CPT

## 2022-04-07 NOTE — PROGRESS NOTES
Diabetic Medication Management Program  [address]. [place], [address] [place] [address]  Phone: [phone]  Fax: [phone]    NAME: Marylee Jensen RECORD NUMBER:  [id number]  AGE: 47 y.o. GENDER: female  : 1970  EPISODE DATE:  2022     Ms. Stefany Anglin was referred to [facility] by Dr. Georgia Dubose. Patient acknowledges working in consult agreement with clinical pharmacist and this provider. Goals per referral:   Fasting blood glucose: < 130  Peak postprandial glucose: < 180  A1C: < 7    SUBJECTIVE     Ms. Stefany Anglin is a 47 y.o. female here for the Diabetes Service for self-management education, medication review including over the counter medications and herbal products, overall wellbeing assessment, transition of care and any needed adjustments with updates and recommendations communicated to the referring physician. Patient Findings:     Medication changes  no  Diet changes  no  Activity changes  no  Emergency Room Visit or Hospitalization  no  Acute Illness/new problems  Increased stress - patient states her mother is ill  Symptoms of hypoglycemia  no - none  Symptoms of hyperglycemia  no - none  Medication adverse reactions  none    Comments: Patient does complain of nausea but has not had vomiting. She speculates that her Budesonide is the cause since there has been no dose change with Rybelus that has previously caused her similar symptoms. There have been no vomiting episodes lately. OBJECTIVE     PMHx:  No past medical history on file.     Social History:    Social History     Tobacco Use    Smoking status: Never Smoker    Smokeless tobacco: Never Used   Substance Use Topics    Alcohol use: Yes     Pertinent Labs:    Lab Results   Component Value Date    LABA1C 9.0 03/15/2022    LABA1C 6.9 (H) 09/15/2021    LABA1C 6.3 2021     Lab Results   Component Value Date    CHOL 132 09/15/2021    TRIG 225 (H) 09/15/2021 HDL 28 (L) 09/15/2021     Lab Results   Component Value Date    CREATININE 0.59 01/06/2021    BUN 14 01/06/2021     01/06/2021    K 3.3 (L) 01/06/2021     01/06/2021    CO2 26 01/06/2021     Lab Results   Component Value Date    ALT 18 01/06/2021     Weight:  Wt Readings from Last 3 Encounters:   04/07/22 225 lb 12.8 oz (102.4 kg)   03/15/22 228 lb 3.2 oz (103.5 kg)   02/22/22 226 lb 12.8 oz (102.9 kg)     Blood Pressure:  BP Readings from Last 3 Encounters:   04/07/22 107/73   03/15/22 111/72   02/22/22 106/74     Current medications:    Current Outpatient Medications:     budesonide (ENTOCORT EC) 3 MG extended release capsule, , Disp: , Rfl:     Continuous Blood Gluc Transmit (DEXCOM G6 TRANSMITTER) MISC, Change every 3 months as directed for continuous glucose monitoring, Disp: 1 each, Rfl: 3    Insulin Pen Needle 29G X 12MM MISC, 1 each by Does not apply route 3 times daily, Disp: , Rfl:     aspirin 81 MG EC tablet, Take 1 tablet by mouth daily, Disp: 90 tablet, Rfl: 1    omeprazole (PRILOSEC) 20 MG delayed release capsule, TAKE 1 CAPSULE BY MOUTH ONE TIME A DAY, Disp: 90 capsule, Rfl: 1    insulin glargine (LANTUS SOLOSTAR) 100 UNIT/ML injection pen, Inject 50 Units into the skin 2 times daily, Disp: 15 pen, Rfl: 5    Icosapent Ethyl (VASCEPA) 1 g CAPS capsule, TAKE TWO CAPSULES BY MOUTH TWICE A DAY, Disp: 360 capsule, Rfl: 1    Semaglutide (RYBELSUS) 14 MG TABS, Take 1 tablet by mouth every morning 30 minutes prior to other food, drink, or medication, Disp: 90 tablet, Rfl: 1    Continuous Blood Gluc Sensor (DEXCOM G6 SENSOR) MISC, Change every 10 days as directed for continuous glucose monitoring, Disp: 9 each, Rfl: 3    lisinopril-hydroCHLOROthiazide (PRINZIDE;ZESTORETIC) 10-12.5 MG per tablet, TAKE 1 TABLET BY MOUTH ONE TIME A DAY, Disp: 90 tablet, Rfl: 5    gemfibrozil (LOPID) 600 MG tablet, TAKE 1 TABLET BY MOUTH 2 TIMES A DAY, Disp: 180 tablet, Rfl: 5    atorvastatin (LIPITOR) 20 MG tablet, TAKE 1 TABLET BY MOUTH ONE TIME A DAY, Disp: 90 tablet, Rfl: 5    levothyroxine (SYNTHROID) 88 MCG tablet, TAKE 1 TABLET BY MOUTH ONE TIME A DAY, Disp: 90 tablet, Rfl: 5    spironolactone (ALDACTONE) 25 MG tablet, TAKE 1 TABLET BY MOUTH ONE TIME A DAY, Disp: 90 tablet, Rfl: 5    metFORMIN (GLUCOPHAGE) 1000 MG tablet, TAKE 1 TABLET BY MOUTH 2 TIMES A DAY WITH MEALS, Disp: 180 tablet, Rfl: 5    insulin lispro, 1 Unit Dial, 100 UNIT/ML SOPN, Inject 10 Units into the skin 3 times daily (with meals), Disp: 15 pen, Rfl: 5    Prodigy Lancets 28G MISC, Use to test blood sugar up to 5 times daily, Disp: 500 each, Rfl: 3    Multiple Vitamins-Minerals (THERAPEUTIC MULTIVITAMIN-MINERALS) tablet, Take 1 tablet by mouth daily, Disp: , Rfl:     vitamin D (CHOLECALCIFEROL) 25 MCG (1000 UT) TABS tablet, Take 1,000 Units by mouth daily, Disp: , Rfl:     vitamin B-12 (CYANOCOBALAMIN) 500 MCG tablet, Take 500 mcg by mouth daily, Disp: , Rfl:     Immunizations:   Most Recent Immunizations   Administered Date(s) Administered    COVID-19, Pfizer Purple top, DILUTE for use, 12+ yrs, 30mcg/0.3mL dose 10/05/2021    DT (pediatric) 01/01/1998    Hepatitis B 02/08/1993    Influenza A (N6W6-05) Vaccine PF IM 10/28/2009    Influenza Virus Vaccine 10/14/2020    Influenza, MDCK Quadv, IM, PF (Flucelvax 2 yrs and older) 01/08/2020    Influenza, Quadv, IM, PF (6 mo and older Fluzone, Flulaval, Fluarix, and 3 yrs and older Afluria) 02/01/2019    Pneumococcal Polysaccharide ([id number]) 11/20/2015    Tdap (Boostrix, Adacel) 02/26/2020     Home Blood Glucose Results: See below                    Blood glucose trends noted: Running higher than previous visit - extremely high elevations likely from dietary choices along with omitting insulin.       ASSESSMENT     Recent A1c: Next 6/15/22    Lab Results   Component Value Date    LABA1C 9.0 03/15/2022    LABA1C 6.9 (H) 09/15/2021    LABA1C 6.3 09/08/2021        Eating patterns: Patient not specifically counting carbs. She is trying to empty freezer which has higher carbs. Exercise patterns: Patient has been walking her dog when the weather is nice about 3 days per week. She tries to take stairs at work. Blood Glucose Testing: Patient is currently using CGM - Dexcom    Current Diabetic Medications: Lantus 50 U BID, Humalog sliding scale, Rybelsus 14mg, Metformin 1000mg BID. Diabetic Regimen/Compliance: Patient has not been taking Humalog prior to each meal.       Other Medication Compliance: Compliant with regimen. PLAN   Diabetic Action Plan is shown below. Patient and provider worked together to create goals which patient will work toward prior to the next appointment.         Physician Follow-up:  As scheduled    Medication Management Follow-up:   Diabetes Service  3 weeks    Electronically signed by Vamshi Pastrana, [address] [place] [address] on 2022 at 7:39 AM    For Pharmacy Admin Tracking Only     Intervention Detail: Adherence Monitorin   Total # of Interventions Recommended: 1   Total # of Interventions Accepted: 1   Time Spent (min): 60

## 2022-04-19 ENCOUNTER — PATIENT MESSAGE (OUTPATIENT)
Dept: FAMILY MEDICINE CLINIC | Age: 52
End: 2022-04-19

## 2022-04-19 RX ORDER — INSULIN LISPRO 100 [IU]/ML
10 INJECTION, SOLUTION INTRAVENOUS; SUBCUTANEOUS
Qty: 15 PEN | Refills: 5 | Status: SHIPPED | OUTPATIENT
Start: 2022-04-19 | End: 2022-04-20 | Stop reason: SDUPTHER

## 2022-04-19 RX ORDER — ORAL SEMAGLUTIDE 14 MG/1
TABLET ORAL
Qty: 90 TABLET | Refills: 1 | Status: SHIPPED | OUTPATIENT
Start: 2022-04-19 | End: 2022-10-05 | Stop reason: SINTOL

## 2022-04-19 RX ORDER — ATORVASTATIN CALCIUM 20 MG/1
TABLET, FILM COATED ORAL
Qty: 90 TABLET | Refills: 5 | Status: SHIPPED | OUTPATIENT
Start: 2022-04-19

## 2022-04-19 RX ORDER — ASPIRIN 81 MG/1
81 TABLET ORAL DAILY
Qty: 90 TABLET | Refills: 1 | Status: SHIPPED | OUTPATIENT
Start: 2022-04-19 | End: 2022-04-25 | Stop reason: SDUPTHER

## 2022-04-19 RX ORDER — INSULIN GLARGINE 100 [IU]/ML
50 INJECTION, SOLUTION SUBCUTANEOUS 2 TIMES DAILY
Qty: 15 PEN | Refills: 5 | Status: SHIPPED | OUTPATIENT
Start: 2022-04-19 | End: 2022-10-19

## 2022-04-19 RX ORDER — LEVOTHYROXINE SODIUM 88 UG/1
TABLET ORAL
Qty: 90 TABLET | Refills: 5 | Status: SHIPPED | OUTPATIENT
Start: 2022-04-19 | End: 2022-04-25 | Stop reason: SDUPTHER

## 2022-04-19 RX ORDER — SPIRONOLACTONE 25 MG/1
TABLET ORAL
Qty: 90 TABLET | Refills: 5 | Status: SHIPPED | OUTPATIENT
Start: 2022-04-19

## 2022-04-19 RX ORDER — LISINOPRIL AND HYDROCHLOROTHIAZIDE 12.5; 1 MG/1; MG/1
TABLET ORAL
Qty: 90 TABLET | Refills: 5 | Status: SHIPPED | OUTPATIENT
Start: 2022-04-19

## 2022-04-19 RX ORDER — GEMFIBROZIL 600 MG/1
TABLET, FILM COATED ORAL
Qty: 180 TABLET | Refills: 5 | Status: SHIPPED | OUTPATIENT
Start: 2022-04-19

## 2022-04-19 RX ORDER — OMEPRAZOLE 20 MG/1
CAPSULE, DELAYED RELEASE ORAL
Qty: 90 CAPSULE | Refills: 1 | Status: SHIPPED | OUTPATIENT
Start: 2022-04-19 | End: 2022-04-25 | Stop reason: SDUPTHER

## 2022-04-19 NOTE — TELEPHONE ENCOUNTER
Pharm requested    Health Maintenance   Topic Date Due    TSH testing  Never done    Diabetic retinal exam  Never done    Hepatitis B vaccine (2 of 3 - Risk 3-dose series) 03/08/1993    Pneumococcal 0-64 years Vaccine (2 - PCV) 11/20/2016    Shingles Vaccine (1 of 2) Never done    Diabetic microalbuminuria test  06/26/2021    Diabetic foot exam  09/29/2021    Potassium monitoring  01/06/2022    Creatinine monitoring  01/06/2022    A1C test (Diabetic or Prediabetic)  06/15/2022    Breast cancer screen  06/26/2022    Flu vaccine (Season Ended) 09/01/2022    Lipid screen  09/15/2022    Depression Screen  10/28/2022    Colorectal Cancer Screen  01/07/2025    Cervical cancer screen  09/17/2026    DTaP/Tdap/Td vaccine (4 - Td or Tdap) 02/26/2030    COVID-19 Vaccine  Completed    Hepatitis A vaccine  Aged Out    Hib vaccine  Aged Out    Meningococcal (ACWY) vaccine  Aged Out    Hepatitis C screen  Discontinued    HIV screen  Discontinued             (applicable per patient's age: Cancer Screenings, Depression Screening, Fall Risk Screening, Immunizations)    Hemoglobin A1C (%)   Date Value   03/15/2022 9.0   09/15/2021 6.9 (H)   09/08/2021 6.3     Microalb/Crt.  Ratio (mcg/mg creat)   Date Value   06/26/2020 96 (H)     LDL Cholesterol (mg/dL)   Date Value   09/15/2021 59     AST (U/L)   Date Value   01/06/2021 16     ALT (U/L)   Date Value   01/06/2021 18     BUN (mg/dL)   Date Value   01/06/2021 14      (goal A1C is < 7)   (goal LDL is <100) need 30-50% reduction from baseline     BP Readings from Last 3 Encounters:   04/07/22 107/73   03/15/22 111/72   02/22/22 106/74    (goal /80)      All Future Testing planned in [facility]:  Lab Frequency Next Occurrence   PAP SMEAR Once 10/14/2021   YSABEL LOIS DIGITAL SCREEN BILATERAL Once 04/04/2022       Next Visit Date:  Future Appointments   Date Time Provider Beck Campos   4/27/2022  7:30 AM [facility] Amisha Terry   9/23/2022 7:30 AM Jitendra Torrez, DEVI - [facility]            Patient Active Problem List:     Anxiety     Atopic dermatitis     Atopic rhinitis     Benign essential hypertension     Complicated varicose veins     Gastroesophageal reflux disease     Hyperlipidemia     Hypothyroidism     Low HDL (under 40)     Mass of left thigh     Mild intermittent asthma with acute exacerbation     Obesity (BMI 30-39. 9)     Osteochondroma of left fibula     Type 2 diabetes mellitus with hyperglycemia, with long-term current use of insulin (HCC)     Vitamin D deficiency

## 2022-04-19 NOTE — TELEPHONE ENCOUNTER
From: Natalya Houser  To: Dr. Eda Lyles  Sent: 4/19/2022 3:20 PM EDT  Subject: Meds refilled    Can you send refills on my meds? I get 90 day prescriptions.  With [facility] pharmacy    Levothyroxine  Atorvastatin  Omeprazole  Gemfibrozil  Metformin  Baby aspirin  Lisinopril Hydrochlorothiazide  Spironolactone  Humalog  Lantus      Thank you   Cheyanne Garcia

## 2022-04-20 RX ORDER — INSULIN LISPRO 100 [IU]/ML
10 INJECTION, SOLUTION INTRAVENOUS; SUBCUTANEOUS
Qty: 15 PEN | Refills: 5 | Status: SHIPPED | OUTPATIENT
Start: 2022-04-20 | End: 2022-10-18 | Stop reason: SDUPTHER

## 2022-04-24 ENCOUNTER — PATIENT MESSAGE (OUTPATIENT)
Dept: FAMILY MEDICINE CLINIC | Age: 52
End: 2022-04-24

## 2022-04-25 RX ORDER — LEVOTHYROXINE SODIUM 88 UG/1
TABLET ORAL
Qty: 90 TABLET | Refills: 5 | Status: SHIPPED | OUTPATIENT
Start: 2022-04-25

## 2022-04-25 RX ORDER — OMEPRAZOLE 20 MG/1
CAPSULE, DELAYED RELEASE ORAL
Qty: 90 CAPSULE | Refills: 1 | Status: SHIPPED | OUTPATIENT
Start: 2022-04-25 | End: 2022-08-01

## 2022-04-25 RX ORDER — ASPIRIN 81 MG/1
81 TABLET ORAL DAILY
Qty: 90 TABLET | Refills: 1 | Status: SHIPPED | OUTPATIENT
Start: 2022-04-25 | End: 2022-08-01

## 2022-04-27 ENCOUNTER — TELEPHONE (OUTPATIENT)
Dept: PHARMACY | Age: 52
End: 2022-04-27

## 2022-04-27 NOTE — TELEPHONE ENCOUNTER
Patient was a no call/no show for Diabetic appointment today.   Called and left voice mail to reschedule - next available appt is end of May

## 2022-05-02 ENCOUNTER — PATIENT MESSAGE (OUTPATIENT)
Dept: FAMILY MEDICINE CLINIC | Age: 52
End: 2022-05-02

## 2022-05-02 DIAGNOSIS — J01.10 ACUTE NON-RECURRENT FRONTAL SINUSITIS: Primary | ICD-10-CM

## 2022-05-02 RX ORDER — BENZONATATE 100 MG/1
100 CAPSULE ORAL 3 TIMES DAILY PRN
Qty: 40 CAPSULE | Refills: 0 | Status: SHIPPED | OUTPATIENT
Start: 2022-05-02 | End: 2022-09-20 | Stop reason: ALTCHOICE

## 2022-05-02 RX ORDER — AZITHROMYCIN 250 MG/1
250 TABLET, FILM COATED ORAL SEE ADMIN INSTRUCTIONS
Qty: 6 TABLET | Refills: 0 | Status: SHIPPED | OUTPATIENT
Start: 2022-05-02 | End: 2022-05-07

## 2022-05-02 NOTE — TELEPHONE ENCOUNTER
From: Divya Rose  To: Dr. Magallon Mode  Sent: 5/2/2022 9:29 AM EDT  Subject: Continuous cough    I have had respiratory issue with coughing and secretion for 3 weeks been taking mucinex and NyQuil and vitamin c not getting better. Can you help?

## 2022-05-05 ENCOUNTER — CARE COORDINATION (OUTPATIENT)
Dept: OTHER | Facility: CLINIC | Age: 52
End: 2022-05-05

## 2022-05-05 NOTE — CARE COORDINATION
Ambulatory Care Coordination Note  5/5/2022  CM Risk Score: 2  Charlson 10 Year Mortality Risk Score: 23%     ACC: Shaan Richter, RN    Summary Note: Spoke with patient who just got home from work She has the dexcom G6 continuous glucose monitoring . She said her blood sugar 254 at lunch today. She is currently ill with a sinus infection and on Z pack with tessalon pearls. She takes endocort daily since her episode with GI problems and post Covid infection. Pt said this has caused her increase in her A1C . Which is up to 9.0. Last September it was 6.9 . She said she has been so sick over the past few months that this has caused her blood sugar to be consistently higher than normal. Pt has good knowledge of nutrition for her diabetes. Pt is agreeable to diabetes follow up calls. Ambulatory Care Coordination Assessment    Care Coordination Protocol  Referral from Primary Care Provider: No  Week 1 - Initial Assessment        Do you have Home O2 Therapy?: No      Ability to seek help/take action for Emergent Urgent situations i.e. fire, crime, inclement weather or health crisis. : Independent  Ability to ambulate to restroom: Independent  Ability handle personal hygeine needs (bathing/dressing/grooming): Independent  Ability to manage Medications: Independent  Ability to prepare Food Preparation: Independent  Ability to maintain home (clean home, laundry): Independent  Ability to drive and/or has transportation: Independent  Ability to do shopping: Independent  Ability to manage finances: Independent  Is patient able to live independently?: Yes     Current Housing: Private Residence                 Suggested Interventions and Community Resources                  Prior to Admission medications    Medication Sig Start Date End Date Taking?  Authorizing Provider   benzonatate (TESSALON PERLES) 100 MG capsule Take 1 capsule by mouth 3 times daily as needed for Cough 5/2/22   Jewel Ramos MD   azithromycin (ZITHROMAX) 250 MG tablet Take 1 tablet by mouth See Admin Instructions for 5 days 500mg on day 1 followed by 250mg on days 2 - 5 5/2/22 5/7/22  Clifton Reyna MD   omeprazole (PRILOSEC) 20 MG delayed release capsule TAKE 1 CAPSULE BY MOUTH ONE TIME A DAY 4/25/22   Clifton Reyna MD   levothyroxine (SYNTHROID) 88 MCG tablet TAKE 1 TABLET BY MOUTH ONE TIME A DAY 4/25/22   Clifton Reyna MD   aspirin 81 MG EC tablet Take 1 tablet by mouth daily 4/25/22   Clifton Reyna MD   insulin lispro, 1 Unit Dial, 100 UNIT/ML SOPN Inject 10 Units into the skin 3 times daily (with meals) 4/20/22   lCifton Reyna MD   Semaglutide (RYBELSUS) 14 MG TABS TAKE ONE TABLET BY MOUTH EVERY MORNING 30 MINUTES PRIOR TO OTHER FOOD, [employer] [facility] [address] OR MEDICATIONS 4/19/22   Clifton Reyna MD   atorvastatin (LIPITOR) 20 MG tablet TAKE 1 TABLET BY MOUTH ONE TIME A DAY 4/19/22   Clifton Reyna MD   gemfibrozil (LOPID) 600 MG tablet TAKE 1 TABLET BY MOUTH 2 TIMES A DAY 4/19/22   Clifton Reyna MD   metFORMIN (GLUCOPHAGE) 1000 MG tablet TAKE 1 TABLET BY MOUTH 2 TIMES A DAY WITH MEALS 4/19/22   Clifton Reyna MD   lisinopril-hydroCHLOROthiazide (PRINZIDE;ZESTORETIC) 10-12.5 MG per tablet TAKE 1 TABLET BY MOUTH ONE TIME A DAY 4/19/22   Clifton Reyna MD   spironolactone (ALDACTONE) 25 MG tablet TAKE 1 TABLET BY MOUTH ONE TIME A DAY 4/19/22   Clifton Reyna MD   insulin glargine (LANTUS SOLOSTAR) 100 UNIT/ML injection pen Inject 50 Units into the skin 2 times daily 4/19/22   Clifton Reyna MD   budesonide (ENTOCORT EC) 3 MG extended release capsule Take 6 mg by mouth every evening  2/1/22   Historical Provider, MD   Continuous Blood Gluc Transmit (DEXCOM G6 TRANSMITTER) MISC Change every 3 months as directed for continuous glucose monitoring 1/26/22   Clifton Reyna MD   Insulin Pen Needle 29G X 12MM MISC 1 each by Does not apply route 3 times daily    Historical Provider, MD   Icosapent Ethyl (VASCEPA) 1 g CAPS capsule TAKE TWO CAPSULES BY MOUTH TWICE A DAY 8/25/21   Adelso Williamson MD   Continuous Blood Gluc Sensor (DEXCOM G6 SENSOR) MISC Change every 10 days as directed for continuous glucose monitoring 6/8/21   Adelso Williamson MD   Prodigy Lancets 28G MISC Use to test blood sugar up to 5 times daily 9/27/20   Melburn Sham, DO   Multiple Vitamins-Minerals (THERAPEUTIC MULTIVITAMIN-MINERALS) tablet Take 1 tablet by mouth daily    Historical Provider, MD   vitamin D (CHOLECALCIFEROL) 25 MCG (1000 UT) TABS tablet Take 1,000 Units by mouth daily    Historical Provider, MD   vitamin B-12 (CYANOCOBALAMIN) 500 MCG tablet Take 500 mcg by mouth daily    Historical Provider, MD       Future Appointments   Date Time Provider Beck Campos   5/31/2022  7:45 AM [facility]   9/23/2022  7:30 AM Ondina Muller APRN - CNP [facility] [place] [facility], RN- [facility]  Associate Care Manager  [phone]

## 2022-05-12 RX ORDER — ICOSAPENT ETHYL 1000 MG/1
CAPSULE ORAL
Qty: 60 CAPSULE | Refills: 3 | Status: SHIPPED | OUTPATIENT
Start: 2022-05-12 | End: 2022-09-20 | Stop reason: SDUPTHER

## 2022-05-12 NOTE — TELEPHONE ENCOUNTER
Bebe Valdes is calling to request a refill on the following medication(s):    Last Visit Date (If Applicable):  81/5/6008    Next Visit Date:    5/31/2022    Medication Request:  Requested Prescriptions     Pending Prescriptions Disp Refills    Icosapent Ethyl (VASCEPA) 1 g CAPS capsule 60 capsule 3

## 2022-05-26 ENCOUNTER — CARE COORDINATION (OUTPATIENT)
Dept: OTHER | Facility: CLINIC | Age: 52
End: 2022-05-26

## 2022-05-26 NOTE — CARE COORDINATION
Ambulatory Care Coordination Note  5/26/2022  CM Risk Score: 2  Charlson 10 Year Mortality Risk Score: 23%     ACC: Conrado Powers RN    Summary Note: ACM attempted to reach patient for follow up call regarding diabetes education. HIPAA compliant message left requesting a return phone call at patients convenience. Will continue to follow. Care Coordination Interventions    Referral from Primary Care Provider: No  Suggested Interventions and Community Resources         Prior to Admission medications    Medication Sig Start Date End Date Taking?  Authorizing Provider   Icosapent Ethyl (VASCEPA) 1 g CAPS capsule TAKE TWO CAPSULES BY MOUTH TWICE A DAY 5/12/22   Rufus Santos MD   benzonatate (TESSALON PERLES) 100 MG capsule Take 1 capsule by mouth 3 times daily as needed for Cough 5/2/22   Rufus Santos MD   omeprazole (PRILOSEC) 20 MG delayed release capsule TAKE 1 CAPSULE BY MOUTH ONE TIME A DAY 4/25/22   Rufus Santos MD   levothyroxine (SYNTHROID) 88 MCG tablet TAKE 1 TABLET BY MOUTH ONE TIME A DAY 4/25/22   Rufus Santos MD   aspirin 81 MG EC tablet Take 1 tablet by mouth daily 4/25/22   Rufus Santos MD   insulin lispro, 1 Unit Dial, 100 UNIT/ML SOPN Inject 10 Units into the skin 3 times daily (with meals) 4/20/22   Rufus Santos MD   Semaglutide (RYBELSUS) 14 MG TABS TAKE ONE TABLET BY MOUTH EVERY MORNING 30 MINUTES PRIOR TO OTHER FOOD, [address] OR MEDICATIONS 4/19/22   Rufus Santos MD   atorvastatin (LIPITOR) 20 MG tablet TAKE 1 TABLET BY MOUTH ONE TIME A DAY 4/19/22   Rufus Santos MD   gemfibrozil (LOPID) 600 MG tablet TAKE 1 TABLET BY MOUTH 2 TIMES A DAY 4/19/22   Rufus Santos MD   metFORMIN (GLUCOPHAGE) 1000 MG tablet TAKE 1 TABLET BY MOUTH 2 TIMES A DAY WITH MEALS 4/19/22   Rufus Santos MD   lisinopril-hydroCHLOROthiazide (PRINZIDE;ZESTORETIC) 10-12.5 MG per tablet TAKE 1 TABLET BY MOUTH ONE TIME A DAY 4/19/22   Rufus Santos MD   spironolactone (ALDACTONE) 25 MG tablet TAKE 1 TABLET BY MOUTH ONE TIME A DAY 4/19/22   Maryam Colon MD   insulin glargine (LANTUS SOLOSTAR) 100 UNIT/ML injection pen Inject 50 Units into the skin 2 times daily 4/19/22   Maryam Colon MD   budesonide (ENTOCORT EC) 3 MG extended release capsule Take 6 mg by mouth every evening  2/1/22   Historical Provider, MD   Continuous Blood Gluc Transmit (DEXCOM G6 TRANSMITTER) MISC Change every 3 months as directed for continuous glucose monitoring 1/26/22   Maryam Colon MD   Insulin Pen Needle 29G X 12MM MISC 1 each by Does not apply route 3 times daily    Historical Provider, MD   Icosapent Ethyl (VASCEPA) 1 g CAPS capsule TAKE TWO CAPSULES BY MOUTH TWICE A DAY 8/25/21   Maryam Colon MD   Continuous Blood Gluc Sensor (DEXCOM G6 SENSOR) MISC Change every 10 days as directed for continuous glucose monitoring 6/8/21   Maryam Colon MD   Prodigy Lancets 28G MISC Use to test blood sugar up to 5 times daily 9/27/20   Doug Spence, DO   Multiple Vitamins-Minerals (THERAPEUTIC MULTIVITAMIN-MINERALS) tablet Take 1 tablet by mouth daily    Historical Provider, MD   vitamin D (CHOLECALCIFEROL) 25 MCG (1000 UT) TABS tablet Take 1,000 Units by mouth daily    Historical Provider, MD   vitamin B-12 (CYANOCOBALAMIN) 500 MCG tablet Take 500 mcg by mouth daily    Historical Provider, MD       Future Appointments   Date Time Provider Beck Campos   5/31/2022  7:45 AM [facility]   9/23/2022  7:30 AM DEVI Jerez - [facility] Parminder ZIMMERMAN, RN- [facility]  Associate Care Manager  [phone]

## 2022-05-31 ENCOUNTER — OFFICE VISIT (OUTPATIENT)
Dept: FAMILY MEDICINE CLINIC | Age: 52
End: 2022-05-31
Payer: COMMERCIAL

## 2022-05-31 VITALS
SYSTOLIC BLOOD PRESSURE: 106 MMHG | DIASTOLIC BLOOD PRESSURE: 69 MMHG | TEMPERATURE: 97.8 F | WEIGHT: 230.8 LBS | OXYGEN SATURATION: 98 % | HEART RATE: 70 BPM | BODY MASS INDEX: 36.15 KG/M2

## 2022-05-31 DIAGNOSIS — E11.9 TYPE 2 DIABETES MELLITUS WITHOUT COMPLICATION, WITH LONG-TERM CURRENT USE OF INSULIN (HCC): Primary | ICD-10-CM

## 2022-05-31 DIAGNOSIS — J30.2 SEASONAL ALLERGIES: ICD-10-CM

## 2022-05-31 DIAGNOSIS — R11.0 NAUSEA: ICD-10-CM

## 2022-05-31 DIAGNOSIS — Z79.4 TYPE 2 DIABETES MELLITUS WITHOUT COMPLICATION, WITH LONG-TERM CURRENT USE OF INSULIN (HCC): Primary | ICD-10-CM

## 2022-05-31 DIAGNOSIS — Z12.31 ENCOUNTER FOR SCREENING MAMMOGRAM FOR MALIGNANT NEOPLASM OF BREAST: ICD-10-CM

## 2022-05-31 PROCEDURE — 3046F HEMOGLOBIN A1C LEVEL >9.0%: CPT | Performed by: FAMILY MEDICINE

## 2022-05-31 PROCEDURE — 99214 OFFICE O/P EST MOD 30 MIN: CPT | Performed by: FAMILY MEDICINE

## 2022-05-31 RX ORDER — ONDANSETRON 4 MG/1
4 TABLET, ORALLY DISINTEGRATING ORAL 3 TIMES DAILY PRN
Qty: 21 TABLET | Refills: 0 | Status: SHIPPED | OUTPATIENT
Start: 2022-05-31

## 2022-05-31 ASSESSMENT — PATIENT HEALTH QUESTIONNAIRE - PHQ9
SUM OF ALL RESPONSES TO PHQ QUESTIONS 1-9: 0
SUM OF ALL RESPONSES TO PHQ QUESTIONS 1-9: 0
1. LITTLE INTEREST OR PLEASURE IN DOING THINGS: 0
2. FEELING DOWN, DEPRESSED OR HOPELESS: 0
SUM OF ALL RESPONSES TO PHQ QUESTIONS 1-9: 0
SUM OF ALL RESPONSES TO PHQ9 QUESTIONS 1 & 2: 0
SUM OF ALL RESPONSES TO PHQ QUESTIONS 1-9: 0

## 2022-05-31 NOTE — PROGRESS NOTES
General FM note    Russella Lennox is a 47 y.o. female who presents today for follow up on her  medical conditions as noted below. Russella Lennox is c/o of   Chief Complaint   Patient presents with    Diabetes       Patient Active Problem List:     Anxiety     Atopic dermatitis     Atopic rhinitis     Benign essential hypertension     Complicated varicose veins     Gastroesophageal reflux disease     Hyperlipidemia     Hypothyroidism     Low HDL (under 40)     Mass of left thigh     Mild intermittent asthma with acute exacerbation     Obesity (BMI 30-39. 9)     Osteochondroma of left fibula     Type 2 diabetes mellitus with hyperglycemia, with long-term current use of insulin (HCC)     Vitamin D deficiency     No past medical history on file.    Past Surgical History:   Procedure Laterality Date     SECTION       SECTION      ENDOMETRIAL ABLATION      TUBAL LIGATION      UMBILICAL HERNIA REPAIR       Family History   Problem Relation Age of Onset    Ovarian Cancer Mother     Other Father         intracranial hemorrhage     Ovarian Cancer Maternal Grandmother     Stroke Maternal Grandmother     Diabetes Maternal Grandfather     Rheum Arthritis Paternal Grandmother     Diabetes Paternal Grandfather     High Blood Pressure Paternal Grandfather     Colon Cancer Paternal Grandfather     Colon Cancer Brother     Cancer Brother         AML     Current Outpatient Medications   Medication Sig Dispense Refill    ondansetron (ZOFRAN-ODT) 4 MG disintegrating tablet Take 1 tablet by mouth 3 times daily as needed for Nausea or Vomiting 21 tablet 0    Icosapent Ethyl (VASCEPA) 1 g CAPS capsule TAKE TWO CAPSULES BY MOUTH TWICE A DAY 60 capsule 3    benzonatate (TESSALON PERLES) 100 MG capsule Take 1 capsule by mouth 3 times daily as needed for Cough 40 capsule 0    omeprazole (PRILOSEC) 20 MG delayed release capsule TAKE 1 CAPSULE BY MOUTH ONE TIME A DAY 90 capsule 1    levothyroxine (SYNTHROID) 88 MCG tablet TAKE 1 TABLET BY MOUTH ONE TIME A DAY 90 tablet 5    aspirin 81 MG EC tablet Take 1 tablet by mouth daily 90 tablet 1    insulin lispro, 1 Unit Dial, 100 UNIT/ML SOPN Inject 10 Units into the skin 3 times daily (with meals) 15 pen 5    Semaglutide (RYBELSUS) 14 MG TABS TAKE ONE TABLET BY MOUTH EVERY MORNING 30 MINUTES PRIOR TO OTHER FOOD, DRINK OR MEDICATIONS 90 tablet 1    atorvastatin (LIPITOR) 20 MG tablet TAKE 1 TABLET BY MOUTH ONE TIME A DAY 90 tablet 5    gemfibrozil (LOPID) 600 MG tablet TAKE 1 TABLET BY MOUTH 2 TIMES A  tablet 5    metFORMIN (GLUCOPHAGE) 1000 MG tablet TAKE 1 TABLET BY MOUTH 2 TIMES A DAY WITH MEALS 180 tablet 5    lisinopril-hydroCHLOROthiazide (PRINZIDE;ZESTORETIC) 10-12.5 MG per tablet TAKE 1 TABLET BY MOUTH ONE TIME A DAY 90 tablet 5    spironolactone (ALDACTONE) 25 MG tablet TAKE 1 TABLET BY MOUTH ONE TIME A DAY 90 tablet 5    insulin glargine (LANTUS SOLOSTAR) 100 UNIT/ML injection pen Inject 50 Units into the skin 2 times daily 15 pen 5    budesonide (ENTOCORT EC) 3 MG extended release capsule Take 6 mg by mouth every evening       Continuous Blood Gluc Transmit (DEXCOM G6 TRANSMITTER) MISC Change every 3 months as directed for continuous glucose monitoring 1 each 3    Insulin Pen Needle 29G X 12MM MISC 1 each by Does not apply route 3 times daily      Icosapent Ethyl (VASCEPA) 1 g CAPS capsule TAKE TWO CAPSULES BY MOUTH TWICE A  capsule 1    Continuous Blood Gluc Sensor (DEXCOM G6 SENSOR) MISC Change every 10 days as directed for continuous glucose monitoring 9 each 3    Prodigy Lancets 28G MISC Use to test blood sugar up to 5 times daily 500 each 3    Multiple Vitamins-Minerals (THERAPEUTIC MULTIVITAMIN-MINERALS) tablet Take 1 tablet by mouth daily      vitamin D (CHOLECALCIFEROL) 25 MCG (1000 UT) TABS tablet Take 1,000 Units by mouth daily      vitamin B-12 (CYANOCOBALAMIN) 500 MCG tablet Take 500 mcg by mouth daily       No current facility-administered medications for this visit. ALLERGIES:    Allergies   Allergen Reactions    Codeine     Tricor [Fenofibrate]        Social History     Tobacco Use    Smoking status: Never Smoker    Smokeless tobacco: Never Used   Substance Use Topics    Alcohol use: Yes      Body mass index is 36.15 kg/m². /69   Pulse 70   Temp 97.8 °F (36.6 °C)   Wt 230 lb 12.8 oz (104.7 kg)   SpO2 98%   BMI 36.15 kg/m²     Subjective:      HPI    47 y.o. female here for T2DM. She has been following up with a pharmacist for medication checks and discussing of controlling the A1c. The patient tells me that she has been on steroids since January due to a severe colitis. She says with the steroid she has lot of mild nausea and the only thing which helps is eating a lot of bread. She has not tried any medication for nausea. Last A1c in March was 9. The patient was scheduled to see a nutritionist.  She states that she is open to trying medication for nausea. She also said that the sinusitis was treated with an antibiotic but she still has some postnasal drip. She has been using loratadine over-the-counter. The patient has a history of shingles outbreak. She did not get the shingles vaccine yet. Review of Systems   Constitutional: Negative for fever and unexpected weight change. Pertinent items are noted in HPI. Objective:   Physical Exam  Constitutional: VS (see above). General appearance: normal development, habitus and attention, no deformities. No distress. Eyes: normal conjunctiva and lids. CAV: RRR, no RMG. No edema lower extremities. Pulmo: CTA bilateral, no CWR. Skin: no rashes, lesions or ulcers. Musculoskeletal: normal gait. Nails: no clubbing or cyanosis. Psychiatric: alert and oriented to place, time and person. Normal mood and affect. Assessment:       Diagnosis Orders   1.  Type 2 diabetes mellitus without complication, with long-term current use of insulin ([facility] [address]     2. Encounter for screening mammogram for malignant neoplasm of breast  YSABEL DIGITAL SCREEN W OR WO CAD BILATERAL   3. Seasonal allergies     4. Nausea         Plan:   The patient will continue to follow-up with the pharmacist.  But I also discussed with her that she needs to make sure to keep the A1c down as patient is very young. This could have an effect on her kidneys or nerves or heart or eyes. The patient is aware that diabetes is a silent killer. I told her that I will call in the Zofran ODT 4 mg to use if needed for nausea. Do not eat lots of carbs. Continue to manage your insulin as previously. Also discussed referral to use a different antihistamine you can for her allergies. She will  from over-the-counter. Mammogram ordered. Return in about 6 months (around 11/30/2022), or if symptoms worsen or fail to improve, for DM II. Orders Placed This Encounter   Procedures    YSABEL DIGITAL SCREEN W OR WO CAD BILATERAL     Standing Status:   Future     Standing Expiration Date:   7/31/2023     Scheduling Instructions:      May perform additional studies at the discretion of the radiologist: Breast US, breast MRI, imaging guided biopsy, cyst aspiration or MBI. Orders Placed This Encounter   Medications    ondansetron (ZOFRAN-ODT) 4 MG disintegrating tablet     Sig: Take 1 tablet by mouth 3 times daily as needed for Nausea or Vomiting     Dispense:  21 tablet     Refill:  0       Call or return to clinic prn if these symptoms worsen or fail to improve as anticipated. I have reviewed the instructions with the patient, answering all questions to patient's satisfaction. [facility] received counseling on the following healthy behaviors: nutrition, exercise, and medication adherence  Reviewed prior labs and health maintenance. Continue current medications, diet and exercise. Discussed use, benefit, and side effects of prescribed medications.  Barriers to medication compliance addressed. Patient given educational materials - see patient instructions. All patient questions answered. Patient voiced understanding.       Electronically signed by Eda Lyles MD on 5/31/2022 at 8:10 AM       (Please note that portions of this note were completed with a voice recognition program. Efforts were made to edit the dictations but occasionally words are mis-transcribed.)

## 2022-06-22 ENCOUNTER — CARE COORDINATION (OUTPATIENT)
Dept: OTHER | Facility: CLINIC | Age: 52
End: 2022-06-22

## 2022-06-22 NOTE — CARE COORDINATION
Ambulatory Care Coordination Note  6/22/2022  CM Risk Score: 2  Charlson 10 Year Mortality Risk Score: 23%     ACC: Oswaldo Moreno, RN    Summary Note: Pt saw her pcp for diabetes follow up. She has started seeing the diabetic educator at [facility] now is receiving good information and follow up with her   Pt is now counting carbs with her daughter and they are working together on the diet. She said her am sugars are ranging 120-130 and bedtime is 160. She said she is working on decreasing carbs in the evening especially. She is aware of all possible complications from diabetes and we touched on those today. She said her nausea has improved and she has only had to use the zofran 1 x and that was it. She has scheduled future appts with the diabetic educator at [facility]. Pt is going to discuss switching from the dexcom to the [place] free style as it is cheaper she said. Pt feels she is receiving adequate support from the educator at the hospital, ACM will sign off at this time, patient has my contact information if needed     Care Coordination Interventions    Referral from Primary Care Provider: No  Suggested Interventions and Community Resources  Diabetes Education: Completed (Comment: Pt sees the diabetic educator at [facility] [facility]                 This Visit's Progress     COMPLETED: Conditions and Symptoms        I will schedule office visits, as directed by my provider. I will notify my provider of any symptoms that indicate a worsening of my condition. Barriers: stress  Plan for overcoming my barriers: work with pcp and ACM for diabetes support  Confidence: 9/10  Anticipated Goal Completion Date: 5/31/2022 and ongoing              Prior to Admission medications    Medication Sig Start Date End Date Taking?  Authorizing Provider   ondansetron (ZOFRAN-ODT) 4 MG disintegrating tablet Take 1 tablet by mouth 3 times daily as needed for Nausea or Vomiting 5/31/22   Jewel Teixeira MD Icosapent Ethyl (VASCEPA) 1 g CAPS capsule TAKE TWO CAPSULES BY MOUTH TWICE A DAY 5/12/22   Miquel Chilel MD   benzonatate (TESSALON PERLES) 100 MG capsule Take 1 capsule by mouth 3 times daily as needed for Cough 5/2/22   Miquel Chilel MD   omeprazole (PRILOSEC) 20 MG delayed release capsule TAKE 1 CAPSULE BY MOUTH ONE TIME A DAY 4/25/22   Miquel Chilel MD   levothyroxine (SYNTHROID) 88 MCG tablet TAKE 1 TABLET BY MOUTH ONE TIME A DAY 4/25/22   Miquel Chilel MD   aspirin 81 MG EC tablet Take 1 tablet by mouth daily 4/25/22   Miquel Chilel MD   insulin lispro, 1 Unit Dial, 100 UNIT/ML SOPN Inject 10 Units into the skin 3 times daily (with meals) 4/20/22   Miquel Chilel MD   Semaglutide (RYBELSUS) 14 MG TABS TAKE ONE TABLET BY MOUTH EVERY MORNING 30 MINUTES PRIOR TO OTHER FOOD, [facility] [address] OR MEDICATIONS 4/19/22   Miquel Chilel MD   atorvastatin (LIPITOR) 20 MG tablet TAKE 1 TABLET BY MOUTH ONE TIME A DAY 4/19/22   Miquel Chilel MD   gemfibrozil (LOPID) 600 MG tablet TAKE 1 TABLET BY MOUTH 2 TIMES A DAY 4/19/22   Miquel Chilel MD   metFORMIN (GLUCOPHAGE) 1000 MG tablet TAKE 1 TABLET BY MOUTH 2 TIMES A DAY WITH MEALS 4/19/22   Miquel Chilel MD   lisinopril-hydroCHLOROthiazide (PRINZIDE;ZESTORETIC) 10-12.5 MG per tablet TAKE 1 TABLET BY MOUTH ONE TIME A DAY 4/19/22   Miquel Chilel MD   spironolactone (ALDACTONE) 25 MG tablet TAKE 1 TABLET BY MOUTH ONE TIME A DAY 4/19/22   Miquel Chilel MD   insulin glargine (LANTUS SOLOSTAR) 100 UNIT/ML injection pen Inject 50 Units into the skin 2 times daily 4/19/22   Miquel Chilel MD   budesonide (ENTOCORT EC) 3 MG extended release capsule Take 6 mg by mouth every evening  2/1/22   Historical Provider, MD   Continuous Blood Gluc Transmit (DEXCOM G6 TRANSMITTER) MISC Change every 3 months as directed for continuous glucose monitoring 1/26/22   Miquel Chilel MD   Insulin Pen Needle 29G X 12MM MISC 1 each by Does not apply route 3 times daily Historical Provider, MD   Icosapent Ethyl (VASCEPA) 1 g CAPS capsule TAKE TWO CAPSULES BY MOUTH TWICE A DAY 8/25/21   Monik Hunter MD   Continuous Blood Gluc Sensor (DEXCOM G6 SENSOR) MISC Change every 10 days as directed for continuous glucose monitoring 6/8/21   Monik Hunter MD   Prodigy Lancets 28G MISC Use to test blood sugar up to 5 times daily 9/27/20   Ventura Camacho, DO   Multiple Vitamins-Minerals (THERAPEUTIC MULTIVITAMIN-MINERALS) tablet Take 1 tablet by mouth daily    Historical Provider, MD   vitamin D (CHOLECALCIFEROL) 25 MCG (1000 UT) TABS tablet Take 1,000 Units by mouth daily    Historical Provider, MD   vitamin B-12 (CYANOCOBALAMIN) 500 MCG tablet Take 500 mcg by mouth daily    Historical Provider, MD       Future Appointments   Date Time Provider Beck Campos   9/23/2022  7:30 AM DEVI Booth - [facility]       Diabetes Assessment    Meal Planning: Avoidance of concentrated sweets, Carb counting   How often do you test your blood sugar?: Daily   Do you have barriers with adherence to non-pharmacologic self-management interventions?  (Nutrition/Exercise/Self-Monitoring): Yes   Have you ever had to go to the ED for symptoms of low blood sugar?: No       Do you have hyperglycemia symptoms?: No   Do you have hypoglycemia symptoms?: No   Last Blood Sugar Value: 130   Blood Sugar Monitoring Regimen: Before Meals, Morning Fasting, 2 Hours Post Meal, At Bedtime   Blood Sugar Trends: Steady Decrease      Miroslava ZIMMERMAN, RN- [facility]  Associate Care Manager  [phone]

## 2022-08-01 RX ORDER — ASPIRIN 81 MG/1
TABLET, COATED ORAL
Qty: 90 TABLET | Refills: 1 | Status: SHIPPED | OUTPATIENT
Start: 2022-08-01

## 2022-08-01 RX ORDER — OMEPRAZOLE 20 MG/1
CAPSULE, DELAYED RELEASE ORAL
Qty: 90 CAPSULE | Refills: 1 | Status: SHIPPED | OUTPATIENT
Start: 2022-08-01

## 2022-08-01 NOTE — TELEPHONE ENCOUNTER
Sandie Denise is calling to request a refill on the following medication(s):    Medication Request:  Requested Prescriptions     Pending Prescriptions Disp Refills    ASPIRIN LOW DOSE 81 MG EC tablet [Pharmacy Med Name: ASPIRIN LOW DOSE 81MG TBEC] 90 tablet 1     Sig: TAKE 1 TABLET BY MOUTH ONE TIME A DAY       Last Visit Date (If Applicable):  5/19/99    Next Visit Date:    Visit date not found

## 2022-09-06 LAB
CHOLESTEROL/HDL RATIO: 4.6
CHOLESTEROL: 119 MG/DL
ESTIMATED AVERAGE GLUCOSE: 209 MG/DL
GLUCOSE BLD-MCNC: 245 MG/DL (ref 70–99)
HBA1C MFR BLD: 8.9 % (ref 4–6)
HDLC SERPL-MCNC: 26 MG/DL
LDL CHOLESTEROL: 40 MG/DL (ref 0–130)
PATIENT FASTING?: ABNORMAL
TRIGL SERPL-MCNC: 267 MG/DL

## 2022-09-08 DIAGNOSIS — E11.9 TYPE 2 DIABETES MELLITUS WITHOUT COMPLICATION, WITH LONG-TERM CURRENT USE OF INSULIN (HCC): Primary | ICD-10-CM

## 2022-09-08 DIAGNOSIS — Z79.4 TYPE 2 DIABETES MELLITUS WITHOUT COMPLICATION, WITH LONG-TERM CURRENT USE OF INSULIN (HCC): Primary | ICD-10-CM

## 2022-09-13 ENCOUNTER — OFFICE VISIT (OUTPATIENT)
Dept: FAMILY MEDICINE CLINIC | Age: 52
End: 2022-09-13
Payer: COMMERCIAL

## 2022-09-13 VITALS
BODY MASS INDEX: 36.31 KG/M2 | HEART RATE: 66 BPM | TEMPERATURE: 97.1 F | DIASTOLIC BLOOD PRESSURE: 78 MMHG | WEIGHT: 231.8 LBS | OXYGEN SATURATION: 98 % | SYSTOLIC BLOOD PRESSURE: 108 MMHG

## 2022-09-13 DIAGNOSIS — Z79.4 TYPE 2 DIABETES MELLITUS WITHOUT COMPLICATION, WITH LONG-TERM CURRENT USE OF INSULIN (HCC): Primary | ICD-10-CM

## 2022-09-13 DIAGNOSIS — E11.9 TYPE 2 DIABETES MELLITUS WITHOUT COMPLICATION, WITH LONG-TERM CURRENT USE OF INSULIN (HCC): Primary | ICD-10-CM

## 2022-09-13 PROCEDURE — 99214 OFFICE O/P EST MOD 30 MIN: CPT | Performed by: FAMILY MEDICINE

## 2022-09-13 PROCEDURE — 3052F HG A1C>EQUAL 8.0%<EQUAL 9.0%: CPT | Performed by: FAMILY MEDICINE

## 2022-09-13 SDOH — ECONOMIC STABILITY: FOOD INSECURITY: WITHIN THE PAST 12 MONTHS, THE FOOD YOU BOUGHT JUST DIDN'T LAST AND YOU DIDN'T HAVE MONEY TO GET MORE.: NEVER TRUE

## 2022-09-13 SDOH — ECONOMIC STABILITY: FOOD INSECURITY: WITHIN THE PAST 12 MONTHS, YOU WORRIED THAT YOUR FOOD WOULD RUN OUT BEFORE YOU GOT MONEY TO BUY MORE.: NEVER TRUE

## 2022-09-13 ASSESSMENT — PATIENT HEALTH QUESTIONNAIRE - PHQ9
SUM OF ALL RESPONSES TO PHQ QUESTIONS 1-9: 0
1. LITTLE INTEREST OR PLEASURE IN DOING THINGS: 0
SUM OF ALL RESPONSES TO PHQ9 QUESTIONS 1 & 2: 0
2. FEELING DOWN, DEPRESSED OR HOPELESS: 0
SUM OF ALL RESPONSES TO PHQ QUESTIONS 1-9: 0

## 2022-09-13 ASSESSMENT — SOCIAL DETERMINANTS OF HEALTH (SDOH): HOW HARD IS IT FOR YOU TO PAY FOR THE VERY BASICS LIKE FOOD, HOUSING, MEDICAL CARE, AND HEATING?: NOT HARD AT ALL

## 2022-09-13 NOTE — PROGRESS NOTES
mouth 3 times daily as needed for Cough 40 capsule 0    levothyroxine (SYNTHROID) 88 MCG tablet TAKE 1 TABLET BY MOUTH ONE TIME A DAY 90 tablet 5    insulin lispro, 1 Unit Dial, 100 UNIT/ML SOPN Inject 10 Units into the skin 3 times daily (with meals) 15 pen 5    Semaglutide (RYBELSUS) 14 MG TABS TAKE ONE TABLET BY MOUTH EVERY MORNING 30 MINUTES PRIOR TO OTHER FOOD, DRINK OR MEDICATIONS 90 tablet 1    atorvastatin (LIPITOR) 20 MG tablet TAKE 1 TABLET BY MOUTH ONE TIME A DAY 90 tablet 5    gemfibrozil (LOPID) 600 MG tablet TAKE 1 TABLET BY MOUTH 2 TIMES A  tablet 5    metFORMIN (GLUCOPHAGE) 1000 MG tablet TAKE 1 TABLET BY MOUTH 2 TIMES A DAY WITH MEALS 180 tablet 5    lisinopril-hydroCHLOROthiazide (PRINZIDE;ZESTORETIC) 10-12.5 MG per tablet TAKE 1 TABLET BY MOUTH ONE TIME A DAY 90 tablet 5    spironolactone (ALDACTONE) 25 MG tablet TAKE 1 TABLET BY MOUTH ONE TIME A DAY 90 tablet 5    insulin glargine (LANTUS SOLOSTAR) 100 UNIT/ML injection pen Inject 50 Units into the skin 2 times daily 15 pen 5    budesonide (ENTOCORT EC) 3 MG extended release capsule Take 6 mg by mouth every evening       Continuous Blood Gluc Transmit (DEXCOM G6 TRANSMITTER) MISC Change every 3 months as directed for continuous glucose monitoring 1 each 3    Insulin Pen Needle 29G X 12MM MISC 1 each by Does not apply route 3 times daily      Icosapent Ethyl (VASCEPA) 1 g CAPS capsule TAKE TWO CAPSULES BY MOUTH TWICE A  capsule 1    Continuous Blood Gluc Sensor (DEXCOM G6 SENSOR) MISC Change every 10 days as directed for continuous glucose monitoring 9 each 3    Prodigy Lancets 28G MISC Use to test blood sugar up to 5 times daily 500 each 3    Multiple Vitamins-Minerals (THERAPEUTIC MULTIVITAMIN-MINERALS) tablet Take 1 tablet by mouth daily      vitamin D (CHOLECALCIFEROL) 25 MCG (1000 UT) TABS tablet Take 1,000 Units by mouth daily      vitamin B-12 (CYANOCOBALAMIN) 500 MCG tablet Take 500 mcg by mouth daily       No current facility-administered medications for this visit. ALLERGIES:    Allergies   Allergen Reactions    Codeine     Tricor [Fenofibrate]        Social History     Tobacco Use    Smoking status: Never    Smokeless tobacco: Never   Substance Use Topics    Alcohol use: Yes      Body mass index is 36.31 kg/m². /78   Pulse 66   Temp 97.1 °F (36.2 °C)   Wt 231 lb 12.8 oz (105.1 kg)   SpO2 98%   BMI 36.31 kg/m²     Subjective:      HPI    47 y.o. female coming today for follow-up of her diabetes mellitus type 2. The recent A1c was 8.9. Patient has been on Lantus 15 units twice a day. Lispro sliding scale if needed. Rybelsus 14 mg daily. The patient states that she is very busy at work. When she gets home around 9 PM that she cooks not really helping she eats and goes to bed and has a very busy day coming. She states that she has been eating a lot of carbs. She feels that she knows why her glucose levels are up. She says normally her fasting glucose levels around 120   4 times a week. She only has been using the long-acting insulin couple times a week. Again she has not been eating healthy. She did make an appointment with a dietitian again and also with the pharmacist Duane Krishnamurthy. Review of Systems   Constitutional: Negative for fever and unexpected weight change. Pertinent items are noted in HPI. Objective:   Physical Exam  Constitutional: VS (see above). General appearance: normal development, habitus and attention, no deformities. No distress. Eyes: normal conjunctiva and lids. Skin: no rashes, lesions or ulcers. Musculoskeletal: normal gait. Nails: no clubbing or cyanosis. Psychiatric: alert and oriented to place, time and person. Normal mood and affect. Assessment:       Diagnosis Orders   1. Type 2 diabetes mellitus without complication, with long-term current use of insulin ([facility])  Hemoglobin A1C          Plan:   I had a long discussion with the patient.   She is aware that a uncontrolled diabetes will lead to comorbidities in the future  Nerve damage/limp amputation/ kidney failure and more. Discussed with her to bump up the long-acting insulin 1 to 2 units every 3 days. The patient has a continuous glucose monitoring which should help her to stabilize her sugars. Discussed with her in the high glucose levels but also the low glucose levels are very dangerous. Again the patient will hopefully be back in 3 months and that her glucose level hopefully will have improved. Return in about 6 months (around 3/13/2023), or if symptoms worsen or fail to improve, for DM II. Orders Placed This Encounter   Procedures    Hemoglobin A1C     Standing Status:   Future     Standing Expiration Date:   9/13/2023     No orders of the defined types were placed in this encounter. Call or return to clinic prn if these symptoms worsen or fail to improve as anticipated. I have reviewed the instructions with the patient, answering all questions to patient's satisfaction. Caity Gomez received counseling on the following healthy behaviors: nutrition, exercise, and medication adherence  Reviewed prior labs and health maintenance. Continue current medications, diet and exercise. Discussed use, benefit, and side effects of prescribed medications. Barriers to medication compliance addressed. Patient given educational materials - see patient instructions. All patient questions answered. Patient voiced understanding.       Electronically signed by Radha Ann MD on 9/13/2022 at 7:57 AM       (Please note that portions of this note were completed with a voice recognition program. Efforts were made to edit the dictations but occasionally words are mis-transcribed.)

## 2022-09-14 ENCOUNTER — HOSPITAL ENCOUNTER (OUTPATIENT)
Dept: DIABETES SERVICES | Age: 52
Setting detail: THERAPIES SERIES
Discharge: HOME | End: 2022-09-14
Payer: COMMERCIAL

## 2022-09-14 PROCEDURE — G0108 DIAB MANAGE TRN  PER INDIV: HCPCS

## 2022-09-14 NOTE — PROGRESS NOTES
Diabetes Self- Management Education Program Assessment -   Also see Diabetic Screening  Patient, Ray De La Fuente,  here for diabetes self-management education  visit/ assessment. Today's visit was in an individual setting. MEDICAL HISTORY:  No past medical history on file.   Family History   Problem Relation Age of Onset    Ovarian Cancer Mother     Other Father         intracranial hemorrhage     Ovarian Cancer Maternal Grandmother     Stroke Maternal Grandmother     Diabetes Maternal Grandfather     Rheum Arthritis Paternal Grandmother     Diabetes Paternal Grandfather     High Blood Pressure Paternal Grandfather     Colon Cancer Paternal Grandfather     Colon Cancer Brother     Cancer Brother         AML     Codeine and Tricor [fenofibrate]   Immunization History   Administered Date(s) Administered    COVID-19, PFIZER PURPLE top, DILUTE for use, (age 15 y+), 30mcg/0.3mL 12/18/2020, 01/08/2021, 10/05/2021    DT (pediatric) 01/01/1998    Hepatitis B 02/08/1993    Influenza A ([id number]) Vaccine PF IM 10/28/2009    Influenza Virus Vaccine 12/10/2007, 01/15/2010, 10/27/2015, 10/08/2016, 10/14/2020, 10/11/2021    Influenza, FLUARIX, FLULAVAL, FLUZONE (age 10 mo+) AND AFLURIA, (age 1 y+), PF, 0.5mL 11/20/2017, 02/01/2019    Influenza, FLUCELVAX, (age 10 mo+), MDCK, PF, 0.5mL 01/08/2020    Pneumococcal Polysaccharide ([id number]) 11/20/2015    Tdap (Boostrix, Adacel) 01/15/2009, 02/26/2020     Current Medications  Current Outpatient Medications   Medication Sig Dispense Refill    ASPIRIN LOW DOSE 81 MG EC tablet TAKE 1 TABLET BY MOUTH ONE TIME A DAY 90 tablet 1    omeprazole (PRILOSEC) 20 MG delayed release capsule TAKE 1 CAPSULE BY MOUTH ONE TIME A DAY 90 capsule 1    ondansetron (ZOFRAN-ODT) 4 MG disintegrating tablet Take 1 tablet by mouth 3 times daily as needed for Nausea or Vomiting 21 tablet 0    Icosapent Ethyl (VASCEPA) 1 g CAPS capsule TAKE TWO CAPSULES BY MOUTH TWICE A DAY 60 capsule 3    benzonatate (TESSALON PERLES) 100 MG capsule Take 1 capsule by mouth 3 times daily as needed for Cough 40 capsule 0    levothyroxine (SYNTHROID) 88 MCG tablet TAKE 1 TABLET BY MOUTH ONE TIME A DAY 90 tablet 5    insulin lispro, 1 Unit Dial, 100 UNIT/ML SOPN Inject 10 Units into the skin 3 times daily (with meals) 15 pen 5    Semaglutide (RYBELSUS) 14 MG TABS TAKE ONE TABLET BY MOUTH EVERY MORNING 30 MINUTES PRIOR TO OTHER FOOD, DRINK OR MEDICATIONS 90 tablet 1    atorvastatin (LIPITOR) 20 MG tablet TAKE 1 TABLET BY MOUTH ONE TIME A DAY 90 tablet 5    gemfibrozil (LOPID) 600 MG tablet TAKE 1 TABLET BY MOUTH 2 TIMES A  tablet 5    metFORMIN (GLUCOPHAGE) 1000 MG tablet TAKE 1 TABLET BY MOUTH 2 TIMES A DAY WITH MEALS 180 tablet 5    lisinopril-hydroCHLOROthiazide (PRINZIDE;ZESTORETIC) 10-12.5 MG per tablet TAKE 1 TABLET BY MOUTH ONE TIME A DAY 90 tablet 5    spironolactone (ALDACTONE) 25 MG tablet TAKE 1 TABLET BY MOUTH ONE TIME A DAY 90 tablet 5    insulin glargine (LANTUS SOLOSTAR) 100 UNIT/ML injection pen Inject 50 Units into the skin 2 times daily 15 pen 5    budesonide (ENTOCORT EC) 3 MG extended release capsule Take 6 mg by mouth every evening       Continuous Blood Gluc Transmit (DEXCOM G6 TRANSMITTER) MISC Change every 3 months as directed for continuous glucose monitoring 1 each 3    Insulin Pen Needle 29G X 12MM MISC 1 each by Does not apply route 3 times daily      Icosapent Ethyl (VASCEPA) 1 g CAPS capsule TAKE TWO CAPSULES BY MOUTH TWICE A  capsule 1    Continuous Blood Gluc Sensor (DEXCOM G6 SENSOR) MISC Change every 10 days as directed for continuous glucose monitoring 9 each 3    Prodigy Lancets 28G MISC Use to test blood sugar up to 5 times daily 500 each 3    Multiple Vitamins-Minerals (THERAPEUTIC MULTIVITAMIN-MINERALS) tablet Take 1 tablet by mouth daily      vitamin D (CHOLECALCIFEROL) 25 MCG (1000 UT) TABS tablet Take 1,000 Units by mouth daily      vitamin B-12 (CYANOCOBALAMIN) 500 MCG tablet Take 500 mcg by mouth daily       No current facility-administered medications for this encounter.   :     Comments:  Allergies: Allergies   Allergen Reactions    Codeine     Tricor [Fenofibrate]          A1C blood level - at goal < 7%   Lab Results   Component Value Date    LABA1C 8.9 (H) 09/06/2022    LABA1C 9.0 03/15/2022    LABA1C 6.9 (H) 09/15/2021     Lab Results   Component Value Date    LABMICR 96 (H) 06/26/2020    CREATININE 0.59 01/06/2021       Blood pressure ( 130/ 80)  Or less  BP Readings from Last 3 Encounters:   09/13/22 108/78   05/31/22 106/69   04/07/22 107/73        Cholesterol ( LDL under  100)   Lab Results   Component Value Date    LDLCHOLESTEROL 40 09/06/2022    LDLDIRECT 52 06/26/2020       Diabetes Self- Management Education Record    Participant Name: Denita Paez  Referring Provider: Fadia Rose MD   Assessment/Evaluation Ratings:  1=Needs Instruction   4=Demonstrates Understanding/Competency  2=Needs Review   NC=Not Covered    3=Comprehends Key Points  N/A=Not Applicable  Topics/Learning Objectives Pre-session Assess Date:   9/14/22 rs    Instr. Date Reinforce Date Post- session Eval Comments   Diabetes disease process & Treatment process: Define diabetes & pre-diabetes; Identify own type of diabetes; role of the pancreas; signs/symptoms; diagnostic criteria; prevention & treatment options; contributing factors. 3     9/14/22 rs - understands DM type 2 is progressive - has had Dm since GDM with son who is now 24years old. - expressed in her history times of better control and times of worse control -most recently with GI issues and taking steroids for this condition, see poor control         Incorporating nutritional management into lifestyle: Describe effect of type, amount & timing of food on blood glucose; Describe basic meal planning techniques & current nutrition guideline   2  - expresses CHO food group and portion control need/  awareness.  Admitted to eating foods that would settle GI - bread, regular pop ect - now step back from theses. Pattern of eating - BK in am - small meal - will eat meat and 1 starch - brennan - soup and salad  Dn- late 8p -varies - some times out to eat food or other times ninga crock pot meal.  likes more starch and meat meals - stated she will add steamed veggies or salad to round out her meal -   Also expressed will eat for stress / pleasure and hard to control portions with these times    What to eat - Food groups, When to eat - timing of meals and snacks, and How much to eat - portions control. calories/ day   CHO choices/ meal   CHO choices/  day   grams of protein /day   gram of fat /day       9/14/22 rs - encouraged follow healthy eating habits, suggest nuts/ peanut butter in am, reviewed diabetes food hub website    Correctly read food labels & demonstrate CHO counting & portion control with personalized meal plan. Identify dining out strategies, & dietary sick day guidelines. 2     9/14/22 rs - does read food labels and has done diet record keeping in past - stated will start with this again - bring records to follow up with RD and [facility] - stated with keeping records in past saw better BG control and less need for insulin   Incorporating physical activity into lifestyle:   Verbalize effect of exercise on blood glucose levels; benefits of regular exercise; safety considerations; contraindications; maintenance of activity. 2     9/14/22 rs - walking at work   Using medications safely:  Identify effects of diabetes medicines on blood glucose levels; List diabetes medication taken, action & side effects;    2    Metformin and rybellus     Insulin / Injectable - Appropriate injection sites; proper storage; supplies needed; proper technique; safe needle disposal guidelines.    2  9/14/22 rs    Lantus at 50 units BID now - discussed PCP recommendations for up titration of this dose by 2 units every 3 days - looking to see if can get fasting BG in range ( pt hs been seeing bg trend up over night while sleep)   Humalog - rare to never use   Does practice good site rotation  Brief discussion of role of insulin pumps   Monitoring blood glucose, interpreting and using results:  Identify recommended & personal blood glucose targets; importance of testing; testing supplies; HgbA1C target levels; Factors affecting blood glucose; Importance of logging blood glucose levels for pattern recognition; ketone testing; safe lancet disposal.   2    Wears dexcom 6 - has kenisha on phone - used kenisha together and encouraged her to tag/  document CHO in grams ( when eating) and tag when use insulins - she does understand how different food effect her BG and importance of portion control   Prevention, detection & treatment of acute complications:  Identify symptoms of hyper & hypoglycemia, and prevention & treatment strategies. 2    No recent lows - has had lows in past - aware of how to self care    Describe sick day guidelines & indications for  physician notification. Identify short term consequences of poor control. Disaster preparedness strategies    2       Prevention, detection & treatment of chronic complications:  Define the natural course of diabetes & describe the relationship of blood glucose levels to long term complications of diabetes. Identify preventative measures & standards of care. 2     9/14/22 rs - following with PCP    Developing strategies to address psychosocial issues:  Describe feelings about living with diabetes; Describe how stress, depression & anxiety affect blood glucose; Identify coping strategies; Identify support needed & support network available. 1     9/14/22 rs - expressed frustrations at high BG, trying to keep balance in life, willing to seek support and take daily actions for self care    Developing strategies to promote health/change behavior: Identify 7 self-care behaviors; Personal health risk factors;  Benefits, challenges & strategies for behavioral change;    1         Individualized goal selection. My goal , to help me improve my health, I will:   Healthy eating - self monitor diet intake - use food log and CHO in grams in dexcom kenisha       2. Medications- try up titration of lantus 2 units every 3 days to see fasting BG trend and follow up with Jacinta Arcos planned  Patient is also planning on following up with medication management, [facility] for titration of insulin and CGM reviews and support. Also plans to follow up with one on one sessions with MARIAM Kaufman at [address] diabetes education for Carb counting / meal planning. Declined full DSME, patient has good knowledge base     Instruction Method: [x]Lecture/Discussion  []Power Point Presentation  [x]Handouts  []Return Demonstration    Education Materials/Equipment Provided (VIA Mail for phone visits)  :    [x]Self-Management - Initial assessment - Enrolment in to ADA  Where do I Begin, Living with Type 2 diabetes ADA home support program and  handout on diabetes education classes. []Understanding Diabetes session       Book How to Thrive: A guide for Your Journey with Diabetes ADA booklet -pages 4, 14-19, 22-23        View: Understanding Type 2 Diabetes from Animated Diabetes Patient [email or web address]    [] Healthy Eating and Meal planning  How to Thrive: A guide for Your journey with Diabetes - ADA booklet - pages 20- 21 & 42-43  Handouts: Smarter snacking, Lowdown on low - carb diets, Supermarket savvy, Ready, set, start counting, Reading a nutrition fact label, 7 ways to size up your servings    []   Medications and Prevention of Complications      Book How to Thrive: A guide for Your Journey with Diabetes - ADA booklet -  pages 6-7, 12-13, 24-27 & 28 -35  Handouts: Know your numbers, Vaccinations, Type 2 diabetes and the role of GLP- 1, How to care for your teeth and gums, Caring for your feet, How to pick the right shoes  Individualized Diabetes report card     []  Diet Follow Up- CHO counting and  planning for sick days, holiday, vacations   How to Thrive: A guide for Your journey with Diabetes - ADA booklet  - pages 43- 37  Diabetes Food hub www. diabetesfoodhub.org   Handouts: Sick day rules, Dining out guide, Diabetes and alcohol, Traveling with diabetes, Diabetes disaster preparedness plan, Holidays and special occasions                                                            []  Self care and goal review -  3 month follow -    Handouts: AADE7 Self care behaviors work sheets and personal goal setting worksheet review, DSME support options on line     []Self-Management  Gestational - RN class -Resource materials sent out : care booklet - \" Gestational Diabetes Mellitus ( GDM) toolkit form [place] gestational diabetes postpartum care learning collaborative 2019. \"Simple Guidelines for meal planning with gestational diabetes. SMBG sheets to fax back to M weekly. BD  healthy injection site selection and rotation with 6 mm insulin syringe and 4 mm pen needle. Gestational diabetes handout from [facility] 2016. Did you have gestational diabetes when you were pregnant?  Handout from [facility]  April 2014    []Self-Management Gestational - RD class - My Food Plan for Gestational diabetes    []Glucose Meter     []Insulin Kit     []Other      Encounter Type Date Start Time End Time Comments No Show Dates   Assessment 9/14/22 rs     8 30   9 30    x    Class 1 - Understanding diabetes         Class 2- Nutrition and diabetes          Class 3 - Preventing Complications        Class 4 -  In depth Nutrition and sick day care        Class 5 - 3 month follow up / goal reassessment        Gestational - RN         Gestational - RD        Individual MNT         Shared Med Appt         Yearly Follow-up        Meter Instrx      How to Measure Your 65 LYNDSAY [employer] Patient Education  [email or web address] [email or web address]    Insulin Instrx      []Pen  []Vial & Syringe   BD Diabetes Care: How to Inject Insulin with a Pen Needle  [email or web address] [email or web address]    Diabetes Care: How to Inject Insulin with a Syringe  [email or web address] [email or web address]       DSMS Support :   [x] MNT      [] Annual update     [] Starting Fresh  adults living with diabetes or pre diabetes. [address] [place] [address] [phone] call for dates    []  Diabetes Group at  [place] [address] of [employer] - Free 6 week diabetes education support   classes - use web site interest form found at  [employer] - to enroll       []ADA  Where do I Begin, Living with Type 2 diabetes ADA home support program  Web site: diabetes. org/living    Call: 1800 DIABETES  e-mail: [email or web address]     [x]  Internet web sites - ADAWeb site: diabetes. org and diabetesfoodhub.org -- 9/14/22 rs       Post Education Referrals:      [] [place] Tobacco Quit information sheet and [address] [facility] [address]       [] Dental care - [facility] of [facility]     [] [facility] ([facility]) link  phone number - for information and referral to Donaldo Sanford  Clinically  [address] [facility], WEIGHT MANAGEMENT        []Other  Cameron Ramirez RN  CDE

## 2022-09-14 NOTE — LETTER
[facility]  [address]  [facility] [place] [address]  Phone: [phone]         September 14, 2022    To :Del Howard MD    From: Deyanira Devries RN    Patient: Cong Rios   YOB: 1970   Date of Visit: 9/14/22     An initial assessment to determine diabetes education needs was completed. Education plan included:blood sugar goals, self-monitoring of blood glucose skills, nutrition, carbohydrate counting, insulin adjustments, use of sliding scale/correction formula and use of insulin: carb ratio. An ongoing plan was created to include: follow up:   Patient is also planning on following up with medication management, [facility] Boudreaux for titration of insulin and CGM reviews and support. Also plans to follow up with one on one sessions with MARIAM Guevara at [facility] diabetes education for Carb counting / meal planning. Thank you for the opportunity to provide Diabetes Self Management Education to your patient.

## 2022-09-20 ENCOUNTER — HOSPITAL ENCOUNTER (OUTPATIENT)
Dept: PHARMACY | Age: 52
Setting detail: THERAPIES SERIES
Discharge: HOME | End: 2022-09-20
Payer: COMMERCIAL

## 2022-09-20 VITALS
BODY MASS INDEX: 36.2 KG/M2 | WEIGHT: 231.1 LBS | DIASTOLIC BLOOD PRESSURE: 73 MMHG | HEART RATE: 83 BPM | SYSTOLIC BLOOD PRESSURE: 103 MMHG | OXYGEN SATURATION: 98 % | TEMPERATURE: 97.7 F

## 2022-09-20 PROCEDURE — 99212 OFFICE O/P EST SF 10 MIN: CPT

## 2022-09-20 RX ORDER — SEMAGLUTIDE 1.34 MG/ML
INJECTION, SOLUTION SUBCUTANEOUS
Qty: 1 ADJUSTABLE DOSE PRE-FILLED PEN SYRINGE | Refills: 2 | Status: SHIPPED | OUTPATIENT
Start: 2022-09-20 | End: 2022-10-05 | Stop reason: DRUGHIGH

## 2022-09-20 RX ORDER — PEN NEEDLE, DIABETIC 31 GX5/16"
NEEDLE, DISPOSABLE MISCELLANEOUS
Qty: 100 EACH | Refills: 3 | Status: SHIPPED | OUTPATIENT
Start: 2022-09-20 | End: 2022-10-05

## 2022-09-20 NOTE — PROGRESS NOTES
and worsening the nausea. Due to the prolonged issue with tolerability we will attempt Ozempic as an alternative to Rybelsus    OBJECTIVE     PMHx:  No past medical history on file.     Social History:    Social History     Tobacco Use    Smoking status: Never    Smokeless tobacco: Never   Substance Use Topics    Alcohol use: Yes     Pertinent Labs:    Lab Results   Component Value Date    LABA1C 8.9 (H) 09/06/2022    LABA1C 9.0 03/15/2022    LABA1C 6.9 (H) 09/15/2021     Lab Results   Component Value Date    CHOL 119 09/06/2022    TRIG 267 (H) 09/06/2022    HDL 26 (L) 09/06/2022     Lab Results   Component Value Date    CREATININE 0.59 01/06/2021    BUN 14 01/06/2021     01/06/2021    K 3.3 (L) 01/06/2021     01/06/2021    CO2 26 01/06/2021     Lab Results   Component Value Date/Time    ALT 18 01/06/2021 08:33 AM     Weight:  Wt Readings from Last 3 Encounters:   09/20/22 231 lb 1.6 oz (104.8 kg)   09/13/22 231 lb 12.8 oz (105.1 kg)   05/31/22 230 lb 12.8 oz (104.7 kg)     Blood Pressure:  BP Readings from Last 3 Encounters:   09/20/22 103/73   09/13/22 108/78   05/31/22 106/69     Current medications:    Current Outpatient Medications:     ASPIRIN LOW DOSE 81 MG EC tablet, TAKE 1 TABLET BY MOUTH ONE TIME A DAY, Disp: 90 tablet, Rfl: 1    omeprazole (PRILOSEC) 20 MG delayed release capsule, TAKE 1 CAPSULE BY MOUTH ONE TIME A DAY, Disp: 90 capsule, Rfl: 1    ondansetron (ZOFRAN-ODT) 4 MG disintegrating tablet, Take 1 tablet by mouth 3 times daily as needed for Nausea or Vomiting, Disp: 21 tablet, Rfl: 0    Icosapent Ethyl (VASCEPA) 1 g CAPS capsule, TAKE TWO CAPSULES BY MOUTH TWICE A DAY, Disp: 60 capsule, Rfl: 3    benzonatate (TESSALON PERLES) 100 MG capsule, Take 1 capsule by mouth 3 times daily as needed for Cough, Disp: 40 capsule, Rfl: 0    levothyroxine (SYNTHROID) 88 MCG tablet, TAKE 1 TABLET BY MOUTH ONE TIME A DAY, Disp: 90 tablet, Rfl: 5    insulin lispro, 1 Unit Dial, 100 UNIT/ML SOPN, Inject 10 Units into the skin 3 times daily (with meals), Disp: 15 pen, Rfl: 5    Semaglutide (RYBELSUS) 14 MG TABS, TAKE ONE TABLET BY MOUTH EVERY MORNING 30 MINUTES PRIOR TO OTHER FOOD, DRINK OR MEDICATIONS, Disp: 90 tablet, Rfl: 1    atorvastatin (LIPITOR) 20 MG tablet, TAKE 1 TABLET BY MOUTH ONE TIME A DAY, Disp: 90 tablet, Rfl: 5    gemfibrozil (LOPID) 600 MG tablet, TAKE 1 TABLET BY MOUTH 2 TIMES A DAY, Disp: 180 tablet, Rfl: 5    metFORMIN (GLUCOPHAGE) 1000 MG tablet, TAKE 1 TABLET BY MOUTH 2 TIMES A DAY WITH MEALS, Disp: 180 tablet, Rfl: 5    lisinopril-hydroCHLOROthiazide (PRINZIDE;ZESTORETIC) 10-12.5 MG per tablet, TAKE 1 TABLET BY MOUTH ONE TIME A DAY, Disp: 90 tablet, Rfl: 5    spironolactone (ALDACTONE) 25 MG tablet, TAKE 1 TABLET BY MOUTH ONE TIME A DAY, Disp: 90 tablet, Rfl: 5    insulin glargine (LANTUS SOLOSTAR) 100 UNIT/ML injection pen, Inject 50 Units into the skin 2 times daily, Disp: 15 pen, Rfl: 5    budesonide (ENTOCORT EC) 3 MG extended release capsule, Take 6 mg by mouth every evening , Disp: , Rfl:     Continuous Blood Gluc Transmit (DEXCOM G6 TRANSMITTER) MISC, Change every 3 months as directed for continuous glucose monitoring, Disp: 1 each, Rfl: 3    Insulin Pen Needle 29G X 12MM MISC, 1 each by Does not apply route 3 times daily, Disp: , Rfl:     Icosapent Ethyl (VASCEPA) 1 g CAPS capsule, TAKE TWO CAPSULES BY MOUTH TWICE A DAY, Disp: 360 capsule, Rfl: 1    Continuous Blood Gluc Sensor (DEXCOM G6 SENSOR) MISC, Change every 10 days as directed for continuous glucose monitoring, Disp: 9 each, Rfl: 3    Prodigy Lancets 28G MISC, Use to test blood sugar up to 5 times daily, Disp: 500 each, Rfl: 3    Multiple Vitamins-Minerals (THERAPEUTIC MULTIVITAMIN-MINERALS) tablet, Take 1 tablet by mouth daily, Disp: , Rfl:     vitamin D (CHOLECALCIFEROL) 25 MCG (1000 UT) TABS tablet, Take 1,000 Units by mouth daily, Disp: , Rfl:     vitamin B-12 (CYANOCOBALAMIN) 500 MCG tablet, Take 500 mcg by mouth daily, Disp: , Rfl:     Immunizations:   Most Recent Immunizations   Administered Date(s) Administered    COVID-19, PFIZER PURPLE top, DILUTE for use, (age 15 y+), 30mcg/0.3mL 10/05/2021    DT (pediatric) 01/01/1998    Hepatitis B 02/08/1993    Influenza A ([id number]) Vaccine PF IM 10/28/2009    Influenza Virus Vaccine 10/11/2021    Influenza, FLUARIX, FLULAVAL, FLUZONE (age 10 mo+) AND AFLURIA, (age 1 y+), PF, 0.5mL 02/01/2019    Influenza, FLUCELVAX, (age 10 mo+), MDCK, PF, 0.5mL 01/08/2020    Pneumococcal Polysaccharide ([id number]) 11/20/2015    Tdap (Boostrix, Adacel) 02/26/2020     Home Blood Glucose Results: See below - Back to using Dexcom system for about the last week or so. She had previously stopped testing BG for a period of time after frustration with elevation during steroid therapy and also non-restricted diet. Blood glucose trends noted:  Improved over the last 2 days when patient has made active changes in dietary choices. Otherwise BG is elevated at most times. ASSESSMENT     Recent A1c: NEXT DUE 12/6/22    Lab Results   Component Value Date    LABA1C 8.9 (H) 09/06/2022    LABA1C 9.0 03/15/2022    LABA1C 6.9 (H) 09/15/2021        Eating patterns:   Bad. Eating a lot of carbs. Is aware this not ideal but is doing so to calm nausea. This week made healthy choices - bought fruit and veggies. Not tolerating eggs since nausea problems. Most days packing lunch for work. Buy at work - salad bar and soup. Exercise patterns: Just walking at work     Blood Glucose Testing: Patient is currently using CGM    Current Diabetic Medications: Lantus 50U AM 55U PM, Humalog 10U prior to meals, Metformin 1000mg BID, Rybelsus 14mg AM      Diabetic Regimen/Compliance: Only recently compliant with Humalog but otherwise compliant with regimen. Tolerability of Rybelsus is very poor.      Other Medication Compliance: Compliant      PLAN   Will switch to Injectable Semaglutide (Ozempic) as the patient is not tolerating oral version (Rybelsus) noting daily nausea and gagging along with occasional projectile vomiting. Patient requests refill on insulin pen needles as well. Diabetic Action Plan is shown below. Patient and provider worked together to create goals which patient will work toward prior to the next appointment.       Physician Follow-up:  As scheduled    Medication Management Follow-up:   Diabetes Service  2 weeks - assess tolerance of Ozempic    Electronically signed by Darleen Wagner [address] [place] [address] on 2022 at 7:16 AM    For Pharmacy Admin Tracking Only    Intervention Detail: Adherence Monitorin, New Rx: 1, reason: VALARIE, and Refill(s) Provided  Total # of Interventions Recommended: 3  Total # of Interventions Accepted: 3  Time Spent (min): 60

## 2022-09-21 DIAGNOSIS — Z79.4 TYPE 2 DIABETES MELLITUS WITHOUT COMPLICATION, WITH LONG-TERM CURRENT USE OF INSULIN (HCC): Primary | ICD-10-CM

## 2022-09-21 DIAGNOSIS — E11.9 TYPE 2 DIABETES MELLITUS WITHOUT COMPLICATION, WITH LONG-TERM CURRENT USE OF INSULIN (HCC): Primary | ICD-10-CM

## 2022-09-28 ENCOUNTER — HOSPITAL ENCOUNTER (OUTPATIENT)
Dept: MAMMOGRAPHY | Age: 52
Discharge: HOME | End: 2022-09-30
Payer: COMMERCIAL

## 2022-09-28 DIAGNOSIS — Z12.31 ENCOUNTER FOR SCREENING MAMMOGRAM FOR MALIGNANT NEOPLASM OF BREAST: ICD-10-CM

## 2022-09-28 PROCEDURE — 77063 BREAST TOMOSYNTHESIS BI: CPT

## 2022-09-30 ENCOUNTER — PATIENT MESSAGE (OUTPATIENT)
Dept: FAMILY MEDICINE CLINIC | Age: 52
End: 2022-09-30

## 2022-09-30 ENCOUNTER — HOSPITAL ENCOUNTER (OUTPATIENT)
Age: 52
Setting detail: SPECIMEN
Discharge: HOME | End: 2022-09-30

## 2022-09-30 ENCOUNTER — OFFICE VISIT (OUTPATIENT)
Dept: OBGYN CLINIC | Age: 52
End: 2022-09-30
Payer: COMMERCIAL

## 2022-09-30 VITALS
WEIGHT: 230 LBS | SYSTOLIC BLOOD PRESSURE: 108 MMHG | BODY MASS INDEX: 34.07 KG/M2 | HEIGHT: 69 IN | DIASTOLIC BLOOD PRESSURE: 72 MMHG

## 2022-09-30 DIAGNOSIS — N89.8 VAGINAL ITCHING: ICD-10-CM

## 2022-09-30 DIAGNOSIS — Z12.31 ENCOUNTER FOR SCREENING MAMMOGRAM FOR BREAST CANCER: ICD-10-CM

## 2022-09-30 DIAGNOSIS — K43.9 VENTRAL HERNIA WITHOUT OBSTRUCTION OR GANGRENE: Primary | ICD-10-CM

## 2022-09-30 DIAGNOSIS — Z01.419 ENCOUNTER FOR GYNECOLOGICAL EXAMINATION: Primary | ICD-10-CM

## 2022-09-30 LAB
CANDIDA SPECIES, DNA PROBE: NEGATIVE
GARDNERELLA VAGINALIS, DNA PROBE: NEGATIVE
SOURCE: NORMAL
TRICHOMONAS VAGINALIS DNA: NEGATIVE

## 2022-09-30 PROCEDURE — 99396 PREV VISIT EST AGE 40-64: CPT

## 2022-09-30 NOTE — PROGRESS NOTES
[address] [facility] - [address] [address] [id number]  Dept: [phone]           Patient: Erum Martínez  Primary Care Physician: Leigh Mark MD   Chief Complaint   Patient presents with    Annual Exam     Prev Pap: 21 ASCUS/HPV Neg; Prev Jaja: 22 WNL      HPI: Erum Martínez is a 47 y.o. [id number] who presents today for her annual women's wellness exam. She does work as a palliative care NP. She has 2 children. She is working on adding healthy habits to her routine. Daughter 25 just moved out and works as a nurse. Her son is 24 and still works at home    Working with dietician to manage dm. OBSTETRICAL & GYNECOLOGICAL HISTORY:  OB History    Para Term  AB Living   2 2 2 0 1 2   SAB IAB Ectopic Molar Multiple Live Births   1 0 0 0 1 2      # Outcome Date GA Lbr Sid/2nd Weight Sex Delivery Anes PTL Lv   2 Term     M CS-LTranv   HUI   1A Term     F CS-LTranv   HUI   1B SAB         FD   Age of Menarche: 17  Patient's last menstrual period was 2022. Had ablation after her daughter was born. Her periods are regular, occurring monthly and last 2 days. Light bleeding. She denies dysmenorrhea. Menopause status: premenopausal   Sexually Active:  yes with   Dyspareunia: No  STD History: No  Birth Control: tubal ligation    FAMILY HISTORY:  Family History of Breast, Ovarian, Colon or Uterine Cancer: Yes  - ovarian cancer guidelines, both dx >57 years old    family history includes Cancer in her brother; Colon Cancer in her brother and paternal grandfather; Diabetes in her maternal grandfather and paternal grandfather; High Blood Pressure in her paternal grandfather; Other in her father; Ovarian Cancer in her maternal grandmother and mother; Rheum Arthritis in her paternal grandmother; Stroke in her maternal grandmother. SOCIAL HISTORY:    reports that she has never smoked. She has never used smokeless tobacco. She reports current alcohol use. She reports that she does not use drugs. MEDICAL HISTORY:  is allergic to codeine and tricor [fenofibrate].   CURRENT MEDS W/ ASSOC DIAG           Start Date End Date     ASPIRIN LOW DOSE 81 MG EC tablet  08/01/22  --     TAKE 1 TABLET BY MOUTH ONE TIME A DAY     Associated Diagnoses:  --     atorvastatin (LIPITOR) 20 MG tablet  04/19/22  --     TAKE 1 TABLET BY MOUTH ONE TIME A DAY     Associated Diagnoses:  --     Continuous Blood Gluc Sensor (DEXCOM G6 SENSOR) MISC  06/08/21  --     Change every 10 days as directed for continuous glucose monitoring     Associated Diagnoses:  --     Continuous Blood Gluc Transmit (DEXCOM G6 TRANSMITTER) MISC  01/26/22  --     Change every 3 months as directed for continuous glucose monitoring     Associated Diagnoses:  --     gemfibrozil (LOPID) 600 MG tablet  04/19/22  --     TAKE 1 TABLET BY MOUTH 2 TIMES A DAY     Associated Diagnoses:  --     Icosapent Ethyl (VASCEPA) 1 g CAPS capsule  08/25/21  --     TAKE TWO CAPSULES BY MOUTH TWICE A DAY     Associated Diagnoses:  --     insulin glargine (LANTUS SOLOSTAR) 100 UNIT/ML injection pen  04/19/22  --     Inject 50 Units into the skin 2 times daily     Associated Diagnoses:  --     insulin lispro, 1 Unit Dial, 100 UNIT/ML SOPN  04/20/22  --     Inject 10 Units into the skin 3 times daily (with meals)     Associated Diagnoses:  --     Insulin Pen Needle ([employer] PEN NEEDLES 31G) 31G X 8 MM MISC  09/20/22  --     Inject insulin four times daily as directed     Associated Diagnoses:  --     Insulin Pen Needle 29G X 12MM MISC  --  --     Associated Diagnoses:  --     levothyroxine (SYNTHROID) 88 MCG tablet  04/25/22  --     TAKE 1 TABLET BY MOUTH ONE TIME A DAY     Associated Diagnoses:  --     lisinopril-hydroCHLOROthiazide (PRINZIDE;ZESTORETIC) 10-12.5 MG per tablet  04/19/22  --     TAKE 1 TABLET BY MOUTH ONE TIME A DAY     Associated Diagnoses:  -- metFORMIN (GLUCOPHAGE) 1000 MG tablet  22  --     TAKE 1 TABLET BY MOUTH 2 TIMES A DAY WITH MEALS     Associated Diagnoses:  --     Multiple Vitamins-Minerals (THERAPEUTIC MULTIVITAMIN-MINERALS) tablet  --  --     Associated Diagnoses:  --     omeprazole (PRILOSEC) 20 MG delayed release capsule  22  --     TAKE 1 CAPSULE BY MOUTH ONE TIME A DAY     Associated Diagnoses:  --     ondansetron (ZOFRAN-ODT) 4 MG disintegrating tablet  22  --     Take 1 tablet by mouth 3 times daily as needed for Nausea or Vomiting     Patient not taking: Reported on 2022     Associated Diagnoses:  --     Prodigy Lancets 28G MISC  20  --     Use to test blood sugar up to 5 times daily     Associated Diagnoses:  --     Semaglutide (RYBELSUS) 14 MG TABS  22  --     TAKE ONE TABLET BY MOUTH EVERY MORNING 30 MINUTES PRIOR TO OTHER FOOD, DRINK OR MEDICATIONS     Associated Diagnoses:  --     Semaglutide,0.25 or 0.5MG/DOS, (OZEMPIC, 0.25 OR 0.5 MG/DOSE,) 2 MG/1.5ML SOPN  22  --     Inject 0.5mg subcutaneously once weekly     Associated Diagnoses:  --     spironolactone (ALDACTONE) 25 MG tablet  22  --     TAKE 1 TABLET BY MOUTH ONE TIME A DAY     Associated Diagnoses:  --     vitamin B-12 (CYANOCOBALAMIN) 500 MCG tablet  --  --     Associated Diagnoses:  --     vitamin D (CHOLECALCIFEROL) 25 MCG (1000 UT) TABS tablet  --  --     Associated Diagnoses:  --            has no past medical history on file. has a past surgical history that includes  section (); Tubal ligation;  section (); Endometrial ablation; and Umbilical hernia repair. HEALTH MAINTENANCE:  -Covid vaccine and booster recommended. Annual flu vaccine recommended October-April.   -Discussed Gardisil counseling for all patients 10-37 yo.  -Shingles vaccine:  2 doses Shingrix recommended for adults >50y. o, Single dose Zostavax live vaccine recommended for adults >57 y.o.    -Pap Smear collected motion. Cervical back: Normal range of motion. Lymphadenopathy:      Upper Body:      Right upper body: No supraclavicular or axillary adenopathy. Left upper body: No supraclavicular or axillary adenopathy. Neurological:      General: No focal deficit present. Mental Status: She is alert and oriented to person, place, and time. Mental status is at baseline. Psychiatric:         Attention and Perception: Attention and perception normal.         Mood and Affect: Mood normal.         Speech: Speech normal.         Behavior: Behavior normal. Behavior is cooperative. Thought Content: Thought content normal.         Cognition and Memory: Cognition and memory normal.         Judgment: Judgment normal.   Vitals reviewed. ASSESSMENT & PLAN:    Piedad Batres is a 47 y.o. female [id number] here for her annual women's wellness exam. Today, we discussed    Diagnosis Orders   1. Encounter for gynecological examination  PAP SMEAR      2. Encounter for screening mammogram for breast cancer        3. Vaginal itching  Vaginitis DNA Probe         Return in about 1 year (around 10/2/2023) for annual well woman exam.    Counseling Completed:  -Discussed recommendations to repeat pap as per American Society for Colposcopy and Cervical Pathology guidelines.  -Discussed need for mammograms every 1 year, If >44 yo and last mammogram was negative.  -Discussed Calcium and Vitamin D dosing.  -Discussed need for colonoscopy screening as well as onset for bone density testing.  -Discussed birth control and barrier recommendations. Discussed STD counseling and prevention.  -Hereditary Breast, Ovarian, Colon and Uterine Cancer screening discussed.          -Tobacco & Secondary smoke risks discussed; with recommendation for cessation and avoidance.  -Routine health maintenance per patients PCP discussed. Return to this office annually and PRN.     The patient was seen and evaluated face to face by Thomas Uriostegui DEVI Moffett - CNP   9/30/2022, 9:33 AM

## 2022-09-30 NOTE — TELEPHONE ENCOUNTER
From: Cong Rios  To: Dr. Del Howard  Sent: 9/30/2022 6:37 AM EDT  Subject: Consult for Dr Church Fruit    Will you please send referral for Dr Arlene Nelson to evaluate my hernia?? I have appointment October 19    Thank you

## 2022-10-03 ENCOUNTER — HOSPITAL ENCOUNTER (OUTPATIENT)
Dept: DIABETES SERVICES | Age: 52
Setting detail: THERAPIES SERIES
Discharge: HOME | End: 2022-10-03
Payer: COMMERCIAL

## 2022-10-03 DIAGNOSIS — Z79.4 TYPE 2 DIABETES MELLITUS WITH HYPERGLYCEMIA, WITH LONG-TERM CURRENT USE OF INSULIN (HCC): Primary | ICD-10-CM

## 2022-10-03 DIAGNOSIS — E11.65 TYPE 2 DIABETES MELLITUS WITH HYPERGLYCEMIA, WITH LONG-TERM CURRENT USE OF INSULIN (HCC): Primary | ICD-10-CM

## 2022-10-03 PROCEDURE — G0108 DIAB MANAGE TRN  PER INDIV: HCPCS

## 2022-10-03 NOTE — PROGRESS NOTES
Diabetes Self- Management Education Program Assessment -   Also see Diabetic Screening  Patient, Elsy Rey,  here for diabetes self-management education  visit/ assessment. Today's visit was in an individual setting. MEDICAL HISTORY:  No past medical history on file.   Family History   Problem Relation Age of Onset    Ovarian Cancer Mother     Other Father         intracranial hemorrhage     Ovarian Cancer Maternal Grandmother     Stroke Maternal Grandmother     Diabetes Maternal Grandfather     Rheum Arthritis Paternal Grandmother     Diabetes Paternal Grandfather     High Blood Pressure Paternal Grandfather     Colon Cancer Paternal Grandfather     Colon Cancer Brother     Cancer Brother         AML     Codeine and Tricor [fenofibrate]   Immunization History   Administered Date(s) Administered    COVID-19, MODERNA BLUE border, Primary or Immunocompromised, (age 12y+), IM, 100 mcg/0.5mL 04/01/2022    COVID-19, PFIZER PURPLE top, DILUTE for use, (age 15 y+), 30mcg/0.3mL 12/18/2020, 01/08/2021, 10/05/2021    DT (pediatric) 01/01/1998    Hepatitis B 02/08/1993    Influenza A ([id number]) Vaccine PF IM 10/28/2009    Influenza Virus Vaccine 12/10/2007, 01/15/2010, 10/27/2015, 10/08/2016, 10/14/2020, 10/11/2021    Influenza, FLUARIX, FLULAVAL, FLUZONE (age 10 mo+) AND AFLURIA, (age 1 y+), PF, 0.5mL 11/20/2017, 02/01/2019    Influenza, FLUCELVAX, (age 10 mo+), MDCK, PF, 0.5mL 01/08/2020    Pneumococcal Polysaccharide ([id number]) 11/20/2015    Tdap (Boostrix, Adacel) 01/15/2009, 02/26/2020     Current Medications  Current Outpatient Medications   Medication Sig Dispense Refill    Semaglutide,0.25 or 0.5MG/DOS, (OZEMPIC, 0.25 OR 0.5 MG/DOSE,) 2 MG/1.5ML SOPN Inject 0.5mg subcutaneously once weekly 1 Adjustable Dose Pre-filled Pen Syringe 2    Insulin Pen Needle ([employer] PEN NEEDLES 31G) 31G X 8 MM MISC Inject insulin four times daily as directed 100 each 3    ASPIRIN LOW DOSE 81 MG EC tablet TAKE 1 TABLET BY MOUTH ONE TIME A DAY 90 tablet 1    omeprazole (PRILOSEC) 20 MG delayed release capsule TAKE 1 CAPSULE BY MOUTH ONE TIME A DAY 90 capsule 1    ondansetron (ZOFRAN-ODT) 4 MG disintegrating tablet Take 1 tablet by mouth 3 times daily as needed for Nausea or Vomiting (Patient not taking: Reported on 9/20/2022) 21 tablet 0    levothyroxine (SYNTHROID) 88 MCG tablet TAKE 1 TABLET BY MOUTH ONE TIME A DAY 90 tablet 5    insulin lispro, 1 Unit Dial, 100 UNIT/ML SOPN Inject 10 Units into the skin 3 times daily (with meals) 15 pen 5    Semaglutide (RYBELSUS) 14 MG TABS TAKE ONE TABLET BY MOUTH EVERY MORNING 30 MINUTES PRIOR TO OTHER FOOD, DRINK OR MEDICATIONS (Patient not taking: Reported on 9/30/2022) 90 tablet 1    atorvastatin (LIPITOR) 20 MG tablet TAKE 1 TABLET BY MOUTH ONE TIME A DAY 90 tablet 5    gemfibrozil (LOPID) 600 MG tablet TAKE 1 TABLET BY MOUTH 2 TIMES A  tablet 5    metFORMIN (GLUCOPHAGE) 1000 MG tablet TAKE 1 TABLET BY MOUTH 2 TIMES A DAY WITH MEALS 180 tablet 5    lisinopril-hydroCHLOROthiazide (PRINZIDE;ZESTORETIC) 10-12.5 MG per tablet TAKE 1 TABLET BY MOUTH ONE TIME A DAY 90 tablet 5    spironolactone (ALDACTONE) 25 MG tablet TAKE 1 TABLET BY MOUTH ONE TIME A DAY 90 tablet 5    insulin glargine (LANTUS SOLOSTAR) 100 UNIT/ML injection pen Inject 50 Units into the skin 2 times daily 15 pen 5    Continuous Blood Gluc Transmit (DEXCOM G6 TRANSMITTER) MISC Change every 3 months as directed for continuous glucose monitoring 1 each 3    Insulin Pen Needle 29G X 12MM MISC 1 each by Does not apply route 3 times daily      Icosapent Ethyl (VASCEPA) 1 g CAPS capsule TAKE TWO CAPSULES BY MOUTH TWICE A  capsule 1    Continuous Blood Gluc Sensor (DEXCOM G6 SENSOR) MISC Change every 10 days as directed for continuous glucose monitoring 9 each 3    Prodigy Lancets 28G MISC Use to test blood sugar up to 5 times daily 500 each 3    Multiple Vitamins-Minerals (THERAPEUTIC MULTIVITAMIN-MINERALS) tablet Take 1 tablet by mouth daily      vitamin D (CHOLECALCIFEROL) 25 MCG (1000 UT) TABS tablet Take 1,000 Units by mouth daily      vitamin B-12 (CYANOCOBALAMIN) 500 MCG tablet Take 500 mcg by mouth daily       No current facility-administered medications for this encounter.   :     Comments:  Allergies: Allergies   Allergen Reactions    Codeine     Tricor [Fenofibrate]          A1C blood level - at goal < 7%   Lab Results   Component Value Date    LABA1C 8.9 (H) 09/06/2022    LABA1C 9.0 03/15/2022    LABA1C 6.9 (H) 09/15/2021     Lab Results   Component Value Date    LABMICR 96 (H) 06/26/2020    CREATININE 0.59 01/06/2021       Blood pressure ( 130/ 80)  Or less  BP Readings from Last 3 Encounters:   09/30/22 108/72   09/20/22 103/73   09/13/22 108/78        Cholesterol ( LDL under  100)   Lab Results   Component Value Date    LDLCHOLESTEROL 40 09/06/2022    LDLDIRECT 52 06/26/2020       Diabetes Self- Management Education Record    Participant Name: Reyna Bentley  Referring Provider: Stacie Kaminski MD   Assessment/Evaluation Ratings:  1=Needs Instruction   4=Demonstrates Understanding/Competency  2=Needs Review   NC=Not Covered    3=Comprehends Key Points  N/A=Not Applicable  Topics/Learning Objectives Pre-session Assess Date:   9/14/22 rs    Instr. Date Reinforce Date Post- session Eval Comments   Diabetes disease process & Treatment process: Define diabetes & pre-diabetes; Identify own type of diabetes; role of the pancreas; signs/symptoms; diagnostic criteria; prevention & treatment options; contributing factors. 3     9/14/22 rs - understands DM type 2 is progressive - has had Dm since GDM with son who is now 24years old. - expressed in her history times of better control and times of worse control -most recently with GI issues and taking steroids for this condition, see poor control         Incorporating nutritional management into lifestyle: Describe effect of type, amount & timing of food on blood glucose;  Describe basic meal planning techniques & current nutrition guideline   2  - expresses CHO food group and portion control need/  awareness. Admitted to eating foods that would settle GI - bread, regular pop ect - now step back from theses. Pattern of eating - BK in am - small meal - will eat meat and 1 starch - brennan - soup and salad  Dn- late 8p -varies - some times out to eat food or other times ninga crock pot meal.  likes more starch and meat meals - stated she will add steamed veggies or salad to round out her meal -   Also expressed will eat for stress / pleasure and hard to control portions with these times 10/3/22 JW pt frustrated as bs still elevated despite efforts. Pt has good knowledge base, has done weight watchers in the past. Encouraged her to remember the progress she has made to lower A1c from over 12%. W dexcom CGM report Her avg bs is 208 tends to go highest after support. Pt states tend to follow carb plan similar to when she was pregnant Br 2, Brennan 3, Supper 4 carb. However, patient seems to be eating less than this. Suggested bumping carbs earlier in the day, try pattern of 3,1,3,1,3,1. Gave simple snack ideas. Also, working w med [employer]. Pt does food record keeping. Prefers own log to [employer] kenisha Encouraged pt to email me next Monday. May need to adjust % of macros. What to eat - Food groups, When to eat - timing of meals and snacks, and How much to eat - portions control. calories/ day   CHO choices/ meal   CHO choices/  day   grams of protein /day   gram of fat /day       9/14/22 rs - encouraged follow healthy eating habits, suggest nuts/ peanut butter in am, reviewed diabetes food hub website    Correctly read food labels & demonstrate CHO counting & portion control with personalized meal plan. Identify dining out strategies, & dietary sick day guidelines.    2 10/3/22 JW    9/14/22 rs - does read food labels and has done diet record keeping in past - stated will start with this again - bring records to follow up with RD and to [facility] - stated with keeping records in past saw better BG control and less need for insulin   Incorporating physical activity into lifestyle:   Verbalize effect of exercise on blood glucose levels; benefits of regular exercise; safety considerations; contraindications; maintenance of activity. 2 10/3/22 JW also, walking dog. Encouraged using hand weights for resistance training. 9/14/22 rs - walking at work   Using medications safely:  Identify effects of diabetes medicines on blood glucose levels; List diabetes medication taken, action & side effects;    2 10/3/22 JW    Metformin and rybellus    10/3/22 Bernard Chimes d/natalie too much nausea. NP started pt on ozempic . 50 dose (pt has had 2 doses and doing ok with)   Insulin / Injectable - Appropriate injection sites; proper storage; supplies needed; proper technique; safe needle disposal guidelines. 2  9/14/22 rs    Lantus at 50 units BID now - discussed PCP recommendations for up titration of this dose by 2 units every 3 days - looking to see if can get fasting BG in range ( pt hs been seeing bg trend up over night while sleep)   Humalog - rare to never use   Does practice good site rotation  Brief discussion of role of insulin pumps   Monitoring blood glucose, interpreting and using results:  Identify recommended & personal blood glucose targets; importance of testing; testing supplies; HgbA1C target levels; Factors affecting blood glucose; Importance of logging blood glucose levels for pattern recognition; ketone testing; safe lancet disposal.   2 10/3/22 JW uploaded pt dexcom.  AGP attached to encounter   Wears dexcom 6 - has kenisha on phone - used kenisha together and encouraged her to tag/  document CHO in grams ( when eating) and tag when use insulins - she does understand how different food effect her BG and importance of portion control   Prevention, detection & treatment of acute complications:  Identify symptoms of hyper & hypoglycemia, and prevention & treatment strategies. 2    No recent lows - has had lows in past - aware of how to self care    Describe sick day guidelines & indications for  physician notification. Identify short term consequences of poor control. Disaster preparedness strategies    2       Prevention, detection & treatment of chronic complications:  Define the natural course of diabetes & describe the relationship of blood glucose levels to long term complications of diabetes. Identify preventative measures & standards of care. 2     9/14/22 rs - following with PCP    Developing strategies to address psychosocial issues:  Describe feelings about living with diabetes; Describe how stress, depression & anxiety affect blood glucose; Identify coping strategies; Identify support needed & support network available. 1     9/14/22 rs - expressed frustrations at high BG, trying to keep balance in life, willing to seek support and take daily actions for self care    Developing strategies to promote health/change behavior: Identify 7 self-care behaviors; Personal health risk factors; Benefits, challenges & strategies for behavioral change;    1         Individualized goal selection. My goal , to help me improve my health, I will:   Healthy eating - self monitor diet intake - use food log and CHO in grams in dexcom kenisha       2. Medications- try up titration of lantus 2 units every 3 days to see fasting BG trend and follow up with Jacinta Arcos planned  Patient is also planning on following up with medication management, [facility] for titration of insulin and CGM reviews and support. Also plans to follow up with one on one sessions with MARIAM Molina at [place] [address] diabetes education for Carb counting / meal planning.   Declined full DSME, patient has good knowledge base     Instruction Method: [x]Lecture/Discussion  []Power Point Presentation [x]Handouts  []Return Demonstration    Education Materials/Equipment Provided (VIA Mail for phone visits)  :    [x]Self-Management - Initial assessment - Enrolment in to ADA  Where do I Begin, Living with Type 2 diabetes ADA home support program and  handout on diabetes education classes. []Understanding Diabetes session       Book How to Thrive: A guide for Your Journey with Diabetes ADA booklet -pages 4, 14-19, 22-23        View: Understanding Type 2 Diabetes from Animated Diabetes Patient [email or web address]    [x] Healthy Eating and Meal [id number]   How to Thrive: A guide for Your journey with Diabetes - ADA booklet - pages 20- 21 & 42-43  Handouts: Smarter snacking, Lowdown on low - carb diets, Supermarket savvy, Ready, set, start counting, Reading a nutrition fact label, 7 ways to size up your servings    []   Medications and Prevention of Complications      Book How to Thrive: A guide for Your Journey with Diabetes - ADA booklet -  pages 6-7, 12-13, 24-27 & 28 -35  Handouts: Know your numbers, Vaccinations, Type 2 diabetes and the role of GLP- 1, How to care for your teeth and gums, Caring for your feet, How to pick the right shoes  Individualized Diabetes report card     []  Diet Follow Up- CHO counting and  planning for sick days, holiday, vacations   How to Thrive: A guide for Your journey with Diabetes - ADA booklet  - pages 43- 37  Diabetes Food hub www. diabetesfoodhub.org   Handouts: Sick day rules, Dining out guide, Diabetes and alcohol, Traveling with diabetes, Diabetes disaster preparedness plan, Holidays and special occasions                                                            []  Self care and goal review -  3 month follow -    Handouts: AADE7 Self care behaviors work sheets and personal goal setting worksheet review, DSME support options on line     []Self-Management  Gestational - RN class -Resource materials sent out : care booklet - \" Gestational Diabetes Mellitus ( GDM) toolkit form [place] gestational diabetes postpartum care learning collaborative 2019. \"Simple Guidelines for meal planning with gestational diabetes. SMBG sheets to fax back to MFM weekly. BD  healthy injection site selection and rotation with 6 mm insulin syringe and 4 mm pen needle. Gestational diabetes handout from [facility] 2016. Did you have gestational diabetes when you were pregnant? Handout from [facility]  April 2014    []Self-Management Gestational - RD class - My Food Plan for Gestational diabetes    []Glucose Meter     []Insulin Kit     []Other      Encounter Type Date Start Time End Time Comments No Show Dates   Assessment 9/14/22 rs     8 30   9 30    x    Class 1 - Understanding diabetes         Class 2- Nutrition and diabetes   10/3/22 JW 8:40 9:50 X in person  (sees med mgt, Mirna tomorrow)    Class 3 - Preventing Complications        Class 4 -  In depth Nutrition and sick day care        Class 5 - 3 month follow up / goal reassessment        Gestational - RN         Gestational - RD        Individual MNT         Shared Med Appt         Yearly Follow-up        Meter Instrx      How to Measure Your Blood Sugar - [facility] Patient Education  [email or web address]    Insulin Instrx      []Pen  []Vial & Syringe   BD Diabetes Care: How to Inject Insulin with a Pen Needle  [email or web address]    Diabetes Care: How to Inject Insulin with a Syringe  [email or web address]       DSMS Support :   [x] MNT      [] Annual update     [] Starting Fresh  adults living with diabetes or pre diabetes.  [address] [place] [address] [phone] call for dates    []  Diabetes Group at  [address] [email or web address] - Free 6 week diabetes education support   classes - use web site interest form found at  [email or web address] - to enroll       []ADA  Where do I Begin, Living with Type 2 diabetes ADA home support program  Web site: diabetes. org/living    Call: 1800 DIABETES  e-mail: [email or web address]     [x]  Internet web sites - ADAWeb site: diabetes. org and diabetesfoodhub.org -- 9/14/22 rs       Post Education Referrals:      [] [place] Tobacco Quit information sheet and [address] [facility] , 21       [] Dental care - [facility]     [] [facility]) link  phone number - for information and referral to [address] [facility], WOUND, WEIGHT MANAGEMENT        []Other  Bi Duenas, MARIAM, LD  CDE

## 2022-10-04 ENCOUNTER — HOSPITAL ENCOUNTER (OUTPATIENT)
Dept: PHARMACY | Age: 52
Setting detail: THERAPIES SERIES
Discharge: HOME | End: 2022-10-04
Payer: COMMERCIAL

## 2022-10-04 VITALS
WEIGHT: 231.4 LBS | OXYGEN SATURATION: 98 % | DIASTOLIC BLOOD PRESSURE: 72 MMHG | SYSTOLIC BLOOD PRESSURE: 110 MMHG | TEMPERATURE: 97.3 F | HEART RATE: 74 BPM | BODY MASS INDEX: 34.17 KG/M2

## 2022-10-04 PROCEDURE — 99212 OFFICE O/P EST SF 10 MIN: CPT

## 2022-10-04 RX ORDER — COVID-19 ANTIGEN TEST
KIT MISCELLANEOUS
COMMUNITY
Start: 2022-09-20

## 2022-10-04 NOTE — PROGRESS NOTES
Diabetic Medication Management Program  [address]. [address]  Phone: [phone]  Fax: [phone]    NAME: Charyl Nyhan RECORD NUMBER:  [id number]  AGE: 47 y.o. GENDER: female  : 1970  EPISODE DATE:  10/4/2022     Ms. Leeann Nelson was referred to [facility] Medication Management Services by Dr. Adonis Vela. Patient acknowledges working in consult agreement with clinical pharmacist and this provider. Goals per referral:   Fasting blood glucose: < 130  Peak postprandial glucose: < 180  A1C: < 7    SUBJECTIVE     Ms. Leeann Nelson is a 47 y.o. female here for the Diabetes Service for self-management education, medication review including over the counter medications and herbal products, overall wellbeing assessment, transition of care and any needed adjustments with updates and recommendations communicated to the referring physician. Patient Findings:     Medication changes  Yes: Has taken 2 weeks of Ozempic 0.5mg  Diet changes  Yes: Has met with dietician who echos carb goals previously discussed. Activity changes  no  Emergency Room Visit or Hospitalization  no  Acute Illness/new problems  Patient has consultation with Dr. Anuja Kenyon for hernia repair scheduled. Symptoms of hypoglycemia  no - none  Symptoms of hyperglycemia  no - none  Medication adverse reactions  none - no more extreme nausea/vomiting with Ozempic (compared to Rybelsus) - Taking  (this week is dose 3)    Comments: Reviewed dietary log with Dexcom together. Patient is aware her selections were likely outside of the carb ranges over the weekend as reflected by hyperglycemia. She noticed the improvement in  numbers with better choices that she made. Encouraged patient to continue logging her dietary intake and correlating with her CGM. OBJECTIVE     PMHx:  No past medical history on file.     Social History:    Social History     Tobacco Use    Smoking status: Never    Smokeless tobacco: Never   Substance Use Topics    Alcohol use: Yes     Pertinent Labs:    Lab Results   Component Value Date    LABA1C 8.9 (H) 09/06/2022    LABA1C 9.0 03/15/2022    LABA1C 6.9 (H) 09/15/2021     Lab Results   Component Value Date    CHOL 119 09/06/2022    TRIG 267 (H) 09/06/2022    HDL 26 (L) 09/06/2022     Lab Results   Component Value Date    CREATININE 0.59 01/06/2021    BUN 14 01/06/2021     01/06/2021    K 3.3 (L) 01/06/2021     01/06/2021    CO2 26 01/06/2021     Lab Results   Component Value Date/Time    ALT 18 01/06/2021 08:33 AM     Weight:  Wt Readings from Last 3 Encounters:   10/04/22 231 lb 6.4 oz (105 kg)   09/30/22 230 lb (104.3 kg)   09/20/22 231 lb 1.6 oz (104.8 kg)     Blood Pressure:  BP Readings from Last 3 Encounters:   10/04/22 110/72   09/30/22 108/72   09/20/22 103/73     Current medications:    Current Outpatient Medications:     Semaglutide,0.25 or 0.5MG/DOS, (OZEMPIC, 0.25 OR 0.5 MG/DOSE,) 2 MG/1.5ML SOPN, Inject 0.5mg subcutaneously once weekly, Disp: 1 Adjustable Dose Pre-filled Pen Syringe, Rfl: 2    Insulin Pen Needle ([employer] PEN NEEDLES 31G) 31G X 8 MM MISC, Inject insulin four times daily as directed, Disp: 100 each, Rfl: 3    ASPIRIN LOW DOSE 81 MG EC tablet, TAKE 1 TABLET BY MOUTH ONE TIME A DAY, Disp: 90 tablet, Rfl: 1    omeprazole (PRILOSEC) 20 MG delayed release capsule, TAKE 1 CAPSULE BY MOUTH ONE TIME A DAY, Disp: 90 capsule, Rfl: 1    ondansetron (ZOFRAN-ODT) 4 MG disintegrating tablet, Take 1 tablet by mouth 3 times daily as needed for Nausea or Vomiting (Patient not taking: Reported on 9/20/2022), Disp: 21 tablet, Rfl: 0    levothyroxine (SYNTHROID) 88 MCG tablet, TAKE 1 TABLET BY MOUTH ONE TIME A DAY, Disp: 90 tablet, Rfl: 5    insulin lispro, 1 Unit Dial, 100 UNIT/ML SOPN, Inject 10 Units into the skin 3 times daily (with meals), Disp: 15 pen, Rfl: 5    Semaglutide (RYBELSUS) 14 MG TABS, TAKE ONE TABLET BY MOUTH EVERY MORNING 30 MINUTES PRIOR TO OTHER FOOD, DRINK OR MEDICATIONS (Patient not taking: Reported on 9/30/2022), Disp: 90 tablet, Rfl: 1    atorvastatin (LIPITOR) 20 MG tablet, TAKE 1 TABLET BY MOUTH ONE TIME A DAY, Disp: 90 tablet, Rfl: 5    gemfibrozil (LOPID) 600 MG tablet, TAKE 1 TABLET BY MOUTH 2 TIMES A DAY, Disp: 180 tablet, Rfl: 5    metFORMIN (GLUCOPHAGE) 1000 MG tablet, TAKE 1 TABLET BY MOUTH 2 TIMES A DAY WITH MEALS, Disp: 180 tablet, Rfl: 5    lisinopril-hydroCHLOROthiazide (PRINZIDE;ZESTORETIC) 10-12.5 MG per tablet, TAKE 1 TABLET BY MOUTH ONE TIME A DAY, Disp: 90 tablet, Rfl: 5    spironolactone (ALDACTONE) 25 MG tablet, TAKE 1 TABLET BY MOUTH ONE TIME A DAY, Disp: 90 tablet, Rfl: 5    insulin glargine (LANTUS SOLOSTAR) 100 UNIT/ML injection pen, Inject 50 Units into the skin 2 times daily, Disp: 15 pen, Rfl: 5    Continuous Blood Gluc Transmit (DEXCOM G6 TRANSMITTER) MISC, Change every 3 months as directed for continuous glucose monitoring, Disp: 1 each, Rfl: 3    Insulin Pen Needle 29G X 12MM MISC, 1 each by Does not apply route 3 times daily, Disp: , Rfl:     Icosapent Ethyl (VASCEPA) 1 g CAPS capsule, TAKE TWO CAPSULES BY MOUTH TWICE A DAY, Disp: 360 capsule, Rfl: 1    Continuous Blood Gluc Sensor (DEXCOM G6 SENSOR) MISC, Change every 10 days as directed for continuous glucose monitoring, Disp: 9 each, Rfl: 3    Prodigy Lancets 28G MISC, Use to test blood sugar up to 5 times daily, Disp: 500 each, Rfl: 3    Multiple Vitamins-Minerals (THERAPEUTIC MULTIVITAMIN-MINERALS) tablet, Take 1 tablet by mouth daily, Disp: , Rfl:     vitamin D (CHOLECALCIFEROL) 25 MCG (1000 UT) TABS tablet, Take 1,000 Units by mouth daily, Disp: , Rfl:     vitamin B-12 (CYANOCOBALAMIN) 500 MCG tablet, Take 500 mcg by mouth daily, Disp: , Rfl:     Immunizations:   Most Recent Immunizations   Administered Date(s) Administered    COVID-19, MODERNA BLUE border, Primary or Immunocompromised, (age 12y+), IM, 100 mcg/0.5mL 04/01/2022 COVID-19, PFIZER PURPLE top, DILUTE for use, (age 15 y+), 30mcg/0.3mL 10/05/2021    DT (pediatric) 01/01/1998    Hepatitis B 02/08/1993    Influenza A (E4R8-21) Vaccine PF IM 10/28/2009    Influenza Virus Vaccine 10/11/2021    Influenza, FLUARIX, FLULAVAL, FLUZONE (age 10 mo+) AND AFLURIA, (age 1 y+), PF, 0.5mL 02/01/2019    Influenza, FLUCELVAX, (age 10 mo+), MDCK, PF, 0.5mL 01/08/2020    Pneumococcal Polysaccharide ([id number]) 11/20/2015    Tdap (Boostrix, Adacel) 02/26/2020     Home Blood Glucose Results: See below            Blood glucose trends noted:  Improved over the last day consistent with better dietary choices but otherwise very elevated in the evenings. ASSESSMENT     Recent A1c: Next due 12/6/22  Lab Results   Component Value Date    LABA1C 8.9 (H) 09/06/2022    LABA1C 9.0 03/15/2022    LABA1C 6.9 (H) 09/15/2021      Eating patterns: Patient has started to make better dietary choices but has struggled over the weekend when family was in town. She has been given additional resources from dietician which she has found helpful. Exercise patterns: Has not been doing much outside of walking with work. Blood Glucose Testing: Patient is currently using CGM    Current Diabetic Medications: Lantus 50U am 55u pm, Humalog 10u prior to meals, Ozempic 0.5mg     Diabetic Regimen/Compliance: Has been compliant with regimen and has been taking fast acting insulin to work which was previously forgotten     Other Medication Compliance: Compliant with regimen. PLAN   Diabetic Action Plan is shown below. Patient and provider worked together to create goals which patient will work toward prior to the next appointment.         Physician Follow-up:  As scheduled    Medication Management Follow-up:   Diabetes Service  as above    Electronically signed by Pepper Ohara [facility] on 10/4/2022 at 7:44 AM    For Pharmacy Admin Tracking Only    Intervention Detail: Dose Adjustment: 1, reason: Therapy Optimization, New Rx: 1, reason: Needs Additional Therapy, and Refill(s) Provided  Total # of Interventions Recommended: 3  Total # of Interventions Accepted: 3  Time Spent (min): 60

## 2022-10-05 RX ORDER — SEMAGLUTIDE 1.34 MG/ML
INJECTION, SOLUTION SUBCUTANEOUS
Qty: 3 ML | Refills: 1 | Status: SHIPPED | OUTPATIENT
Start: 2022-10-05

## 2022-10-05 NOTE — TELEPHONE ENCOUNTER
Pen needle refill sent to mail order pharmacy  Ozempic 1mg sent to [facility] for when patient is transitioning from 0.5mg dose to 1mg

## 2022-10-14 LAB — CYTOLOGY REPORT: NORMAL

## 2022-10-17 ENCOUNTER — HOSPITAL ENCOUNTER (OUTPATIENT)
Dept: DIABETES SERVICES | Age: 52
Setting detail: THERAPIES SERIES
Discharge: HOME | End: 2022-10-17
Payer: COMMERCIAL

## 2022-10-18 ENCOUNTER — PATIENT MESSAGE (OUTPATIENT)
Dept: FAMILY MEDICINE CLINIC | Age: 52
End: 2022-10-18

## 2022-10-18 RX ORDER — ICOSAPENT ETHYL 1000 MG/1
CAPSULE ORAL
Qty: 360 CAPSULE | Refills: 1 | Status: SHIPPED | OUTPATIENT
Start: 2022-10-18

## 2022-10-18 NOTE — TELEPHONE ENCOUNTER
From: Kelle Fall  To: Dr. Ashvin Gleason  Sent: 10/18/2022 8:06 AM EDT  Subject: Dexcom transmitter    Will you please send order to [facility] for [facility] transmitter? Will you also send prescription to [facility] for my vascepa in 90 day order ?     Thank you

## 2022-10-19 ENCOUNTER — TRANSCRIBE ORDERS (OUTPATIENT)
Dept: ADMINISTRATIVE | Age: 52
End: 2022-10-19

## 2022-10-19 DIAGNOSIS — R10.84 GENERALIZED ABDOMINAL PAIN: Primary | ICD-10-CM

## 2022-10-19 RX ORDER — INSULIN GLARGINE 100 [IU]/ML
50 INJECTION, SOLUTION SUBCUTANEOUS 2 TIMES DAILY
Qty: 15 ADJUSTABLE DOSE PRE-FILLED PEN SYRINGE | Refills: 1 | Status: SHIPPED | OUTPATIENT
Start: 2022-10-19

## 2022-10-19 RX ORDER — BLOOD-GLUCOSE SENSOR
EACH MISCELLANEOUS
Qty: 9 EACH | Refills: 3 | Status: SHIPPED | OUTPATIENT
Start: 2022-10-19

## 2022-10-19 RX ORDER — INSULIN GLARGINE 100 [IU]/ML
INJECTION, SOLUTION SUBCUTANEOUS
Qty: 45 ML | Refills: 5 | Status: SHIPPED | OUTPATIENT
Start: 2022-10-19

## 2022-10-19 RX ORDER — INSULIN LISPRO 100 [IU]/ML
10 INJECTION, SOLUTION INTRAVENOUS; SUBCUTANEOUS
Qty: 15 ADJUSTABLE DOSE PRE-FILLED PEN SYRINGE | Refills: 1 | Status: SHIPPED | OUTPATIENT
Start: 2022-10-19

## 2022-10-27 RX ORDER — BLOOD-GLUCOSE TRANSMITTER
EACH MISCELLANEOUS
Qty: 1 EACH | Refills: 3 | Status: SHIPPED | OUTPATIENT
Start: 2022-10-27

## 2022-10-27 NOTE — TELEPHONE ENCOUNTER
Patient requests refill on Dexcom transmitter to be sent to [facility]    Intervention Detail: Refill(s) Provided  Total # of Interventions Recommended: 1  Total # of Interventions Accepted: 1  Time Spent (min): 10

## 2022-10-28 ENCOUNTER — HOSPITAL ENCOUNTER (OUTPATIENT)
Dept: CT IMAGING | Age: 52
Discharge: HOME | End: 2022-10-30
Payer: COMMERCIAL

## 2022-10-28 DIAGNOSIS — R10.84 GENERALIZED ABDOMINAL PAIN: ICD-10-CM

## 2022-10-28 LAB
CREAT SERPL-MCNC: 0.87 MG/DL (ref 0.5–0.9)
GFR SERPL CREATININE-BSD FRML MDRD: >60 ML/MIN/1.73M2

## 2022-10-28 PROCEDURE — 2580000003 HC RX 258: Performed by: SURGERY

## 2022-10-28 PROCEDURE — A4641 RADIOPHARM DX AGENT NOC: HCPCS | Performed by: SURGERY

## 2022-10-28 PROCEDURE — 6360000004 HC RX CONTRAST MEDICATION: Performed by: SURGERY

## 2022-10-28 PROCEDURE — 36415 COLL VENOUS BLD VENIPUNCTURE: CPT

## 2022-10-28 PROCEDURE — 82565 ASSAY OF CREATININE: CPT

## 2022-10-28 PROCEDURE — 74177 CT ABD & PELVIS W/CONTRAST: CPT

## 2022-10-28 RX ORDER — SODIUM CHLORIDE 0.9 % (FLUSH) 0.9 %
10 SYRINGE (ML) INJECTION ONCE
Status: COMPLETED | OUTPATIENT
Start: 2022-10-28 | End: 2022-10-28

## 2022-10-28 RX ORDER — 0.9 % SODIUM CHLORIDE 0.9 %
80 INTRAVENOUS SOLUTION INTRAVENOUS ONCE
Status: COMPLETED | OUTPATIENT
Start: 2022-10-28 | End: 2022-10-28

## 2022-10-28 RX ADMIN — SODIUM CHLORIDE 80 ML: 9 INJECTION, SOLUTION INTRAVENOUS at 15:26

## 2022-10-28 RX ADMIN — IOPAMIDOL 75 ML: 755 INJECTION, SOLUTION INTRAVENOUS at 15:26

## 2022-10-28 RX ADMIN — SODIUM CHLORIDE, PRESERVATIVE FREE 10 ML: 5 INJECTION INTRAVENOUS at 15:26

## 2022-10-28 RX ADMIN — BARIUM SULFATE 450 ML: 20 SUSPENSION ORAL at 15:26

## 2022-11-02 ENCOUNTER — APPOINTMENT (OUTPATIENT)
Dept: PHARMACY | Age: 52
End: 2022-11-02
Payer: COMMERCIAL

## 2022-11-09 ENCOUNTER — APPOINTMENT (OUTPATIENT)
Dept: PHARMACY | Age: 52
End: 2022-11-09
Payer: COMMERCIAL

## 2022-11-15 ENCOUNTER — TELEPHONE (OUTPATIENT)
Dept: PHARMACY | Facility: CLINIC | Age: 52
End: 2022-11-15

## 2022-11-15 ENCOUNTER — HOSPITAL ENCOUNTER (OUTPATIENT)
Dept: PHARMACY | Age: 52
Setting detail: THERAPIES SERIES
Discharge: HOME | End: 2022-11-15
Payer: COMMERCIAL

## 2022-11-15 VITALS
DIASTOLIC BLOOD PRESSURE: 70 MMHG | HEART RATE: 78 BPM | SYSTOLIC BLOOD PRESSURE: 95 MMHG | WEIGHT: 226.5 LBS | BODY MASS INDEX: 33.45 KG/M2 | TEMPERATURE: 97.7 F | OXYGEN SATURATION: 96 %

## 2022-11-15 DIAGNOSIS — Z79.4 TYPE 2 DIABETES MELLITUS WITH HYPERGLYCEMIA, WITH LONG-TERM CURRENT USE OF INSULIN (HCC): Primary | ICD-10-CM

## 2022-11-15 DIAGNOSIS — E11.65 TYPE 2 DIABETES MELLITUS WITH HYPERGLYCEMIA, WITH LONG-TERM CURRENT USE OF INSULIN (HCC): Primary | ICD-10-CM

## 2022-11-15 PROCEDURE — 99212 OFFICE O/P EST SF 10 MIN: CPT

## 2022-11-15 NOTE — PROGRESS NOTES
Diabetic Medication Management Program  [address]. [facility], [address] [facility]  Phone: [phone]  Fax: [phone]    NAME: Stacy Blanca RECORD NUMBER:  [id number]  AGE: 47 y.o. GENDER: female  : 1970  EPISODE DATE:  11/15/2022     Ms. Amari Menjivar was referred to [address] [place] [address] Medication Management Services by Dr. Artemio Gee. Patient acknowledges working in consult agreement with clinical pharmacist and this provider. Goals per referral:   Fasting blood glucose: < 130  Peak postprandial glucose: < 180  A1C: < 7    SUBJECTIVE     Ms. Amari Menjivar is a 47 y.o. female here for the Diabetes Service for self-management education, medication review including over the counter medications and herbal products, overall wellbeing assessment, transition of care and any needed adjustments with updates and recommendations communicated to the referring physician. Patient Findings:     Medication changes   Taking [address] on   Diet changes   Making sure to have more veggies. Portion control betty with higher carb meals. Still eating more carbs but doing better. Using low carb bread when having sandwich  Activity changes   Walking at work and at home with dog in the yard  Emergency Room Visit or Hospitalization  no  Acute Illness/new problems  no  Symptoms of hypoglycemia  no - none  Symptoms of hyperglycemia  no - none  Medication adverse reactions  none    Comments: Less nausea/queezy with Ozempic     OBJECTIVE     PMHx:  No past medical history on file.     Social History:    Social History     Tobacco Use    Smoking status: Never    Smokeless tobacco: Never   Substance Use Topics    Alcohol use: Yes     Pertinent Labs:    Lab Results   Component Value Date    LABA1C 8.9 (H) 2022    LABA1C 9.0 03/15/2022    LABA1C 6.9 (H) 09/15/2021     Lab Results   Component Value Date    CHOL 119 2022    TRIG 267 (H) 2022    HDL 26 (L) 2022     Lab Results   Component Value Date    CREATININE 0.87 10/28/2022    BUN 14 01/06/2021     01/06/2021    K 3.3 (L) 01/06/2021     01/06/2021    CO2 26 01/06/2021     Lab Results   Component Value Date/Time    ALT 18 01/06/2021 08:33 AM     Weight:  Wt Readings from Last 3 Encounters:   11/15/22 226 lb 8 oz (102.7 kg)   10/04/22 231 lb 6.4 oz (105 kg)   09/30/22 230 lb (104.3 kg)     Blood Pressure:  BP Readings from Last 3 Encounters:   11/15/22 95/70   10/04/22 110/72   09/30/22 108/72     Current medications:    Current Outpatient Medications:     Continuous Blood Gluc Transmit (DEXCOM G6 TRANSMITTER) MISC, Change every 90 days for continuous glucose monitoring, Disp: 1 each, Rfl: 3    LANTUS SOLOSTAR 100 UNIT/ML injection pen, INJECT 50 UNITS INTO THE SKIN TWO TIMES A DAY, Disp: 45 mL, Rfl: 5    Continuous Blood Gluc Sensor (DEXCOM G6 SENSOR) MISC, CHANGE EVERY 10 DAYS AS DIRECTED FOR CONTINUOUS GLUCOSE MONITORING, Disp: 9 each, Rfl: 3    Continuous Blood Gluc Sensor (DEXCOM G6 SENSOR) MISC, Change every 10 days as directed for continuous glucose monitoring, Disp: 9 each, Rfl: 3    insulin glargine (LANTUS SOLOSTAR) 100 UNIT/ML injection pen, Inject 50 Units into the skin 2 times daily, Disp: 15 Adjustable Dose Pre-filled Pen Syringe, Rfl: 1    insulin lispro, 1 Unit Dial, (HUMALOG/ADMELOG) 100 UNIT/ML SOPN, Inject 10 Units into the skin 3 times daily (with meals), Disp: 15 Adjustable Dose Pre-filled Pen Syringe, Rfl: 1    Icosapent Ethyl (VASCEPA) 1 g CAPS capsule, TAKE TWO CAPSULES BY MOUTH TWICE A DAY, Disp: 360 capsule, Rfl: 1    Insulin Pen Needle 29G X 12MM MISC, Use to inject insulin 5 times daily, Disp: 200 each, Rfl: 3    Semaglutide, 1 MG/DOSE, (OZEMPIC, 1 MG/DOSE,) 4 MG/3ML SOPN, Inject 1mg once weekly as directed., Disp: 3 mL, Rfl: 1    FLOWFLEX COVID-19 AG HOME TEST KIT, , Disp: , Rfl:     ASPIRIN LOW DOSE 81 MG EC tablet, TAKE 1 TABLET BY MOUTH ONE TIME A DAY, Disp: 90 tablet, Rfl: 1 omeprazole (PRILOSEC) 20 MG delayed release capsule, TAKE 1 CAPSULE BY MOUTH ONE TIME A DAY, Disp: 90 capsule, Rfl: 1    ondansetron (ZOFRAN-ODT) 4 MG disintegrating tablet, Take 1 tablet by mouth 3 times daily as needed for Nausea or Vomiting (Patient not taking: Reported on 9/20/2022), Disp: 21 tablet, Rfl: 0    levothyroxine (SYNTHROID) 88 MCG tablet, TAKE 1 TABLET BY MOUTH ONE TIME A DAY, Disp: 90 tablet, Rfl: 5    atorvastatin (LIPITOR) 20 MG tablet, TAKE 1 TABLET BY MOUTH ONE TIME A DAY, Disp: 90 tablet, Rfl: 5    gemfibrozil (LOPID) 600 MG tablet, TAKE 1 TABLET BY MOUTH 2 TIMES A DAY, Disp: 180 tablet, Rfl: 5    metFORMIN (GLUCOPHAGE) 1000 MG tablet, TAKE 1 TABLET BY MOUTH 2 TIMES A DAY WITH MEALS, Disp: 180 tablet, Rfl: 5    lisinopril-hydroCHLOROthiazide (PRINZIDE;ZESTORETIC) 10-12.5 MG per tablet, TAKE 1 TABLET BY MOUTH ONE TIME A DAY, Disp: 90 tablet, Rfl: 5    spironolactone (ALDACTONE) 25 MG tablet, TAKE 1 TABLET BY MOUTH ONE TIME A DAY, Disp: 90 tablet, Rfl: 5    Prodigy Lancets 28G MISC, Use to test blood sugar up to 5 times daily, Disp: 500 each, Rfl: 3    Multiple Vitamins-Minerals (THERAPEUTIC MULTIVITAMIN-MINERALS) tablet, Take 1 tablet by mouth daily, Disp: , Rfl:     vitamin D (CHOLECALCIFEROL) 25 MCG (1000 UT) TABS tablet, Take 1,000 Units by mouth daily, Disp: , Rfl:     vitamin B-12 (CYANOCOBALAMIN) 500 MCG tablet, Take 500 mcg by mouth daily, Disp: , Rfl:     Immunizations:   Most Recent Immunizations   Administered Date(s) Administered    COVID-19, MODERNA BLUE border, Primary or Immunocompromised, (age 12y+), IM, 100 mcg/0.5mL 04/01/2022    COVID-19, PFIZER Bivalent BOOSTER, (age 12y+), IM, 30 mcg/0.3 mL dose 10/26/2022    COVID-19, PFIZER PURPLE top, DILUTE for use, (age 15 y+), 30mcg/0.3mL 10/05/2021    DT (pediatric) 01/01/1998    Hepatitis B 02/08/1993    Influenza A ([id number]) Vaccine PF IM 10/28/2009    Influenza Virus Vaccine 10/11/2021    Influenza, FLUARIX, FLULAVAL, FLUZONE (age 10 mo+) AND AFLURIA, (age 1 y+), PF, 0.5mL 02/01/2019    Influenza, FLUCELVAX, (age 10 mo+), MDCK, PF, 0.5mL 01/08/2020    Pneumococcal Polysaccharide ([id number]) 11/20/2015    Tdap (Boostrix, Adacel) 02/26/2020     Home Blood Glucose Results: See below          Blood glucose trends noted:  See below  - Evening meal is biggest challenge. ASSESSMENT     Recent A1c: Next due 12/6/22    Lab Results   Component Value Date    LABA1C 8.9 (H) 09/06/2022    LABA1C 9.0 03/15/2022    LABA1C 6.9 (H) 09/15/2021      Eating patterns: Reduced appetite with Ozempic without the horrible nausea/vomiting of Rybelsus    Exercise patterns: 3 acre yard and has been walking around property and taking dogs out. Blood Glucose Testing: Patient is currently using CGM    Current Diabetic Medications: Ozempic 1mg has been tolerated well - patient will be taking 4th dose of 1mg this week (started 4 doses of 0.5mg and now 1mg 4 doses). Will consider increase to 2mg in 1 month. Will evaluate BG trends and availability of med (was on recent backorder)     Diabetic Regimen/Compliance: Compliant with regimen     Other Medication Compliance: Compliant with regimen      PLAN   Diabetic Action Plan is shown below. Patient and provider worked together to create goals which patient will work toward prior to the next appointment.         Physician Follow-up:  As scheduled    Medication Management Follow-up:   Diabetes Service - Needs A1C December     Electronically signed by Yeison Epstein [facility] on 11/15/2022 at 7:37 AM    For Pharmacy Admin Tracking Only    Intervention Detail:   Total # of Interventions Recommended: 0  Total # of Interventions Accepted: 0  Time Spent (min): 60

## 2022-11-15 NOTE — TELEPHONE ENCOUNTER
Aristides Cee MD  - patient enrolled in [facility] with diabetes program which requires yearly urine microalbumin  - per chart last urine micro was ordered 6/2020 and microalb/crt ratio was elevated  - consider urine micro this year    Thank you,  Brayan Simon PharmD, [address] Pharmacist  Department: [phone]  ===================================================  [facility],[address] Employee Diabetes Program    Phyllis Chen is a 47 y.o. female enrolled in the [place] with Diabetes Program. The goal of this voluntary program is to help employees and covered dependents reach their health maintenance goals in regards to their diabetes diagnosis. According to our records, patient is missing the following requirement(s) that must be completed by December 31, 2022 to avoid discharge from the program:    Urine protein/microalbumin     Plan:  Last ordered in 2020. Will ask pcp order.      Brayan Simon PharmD, [address] Pharmacist  Department: [address] [place] [address] in place:  No  Recommendation Provided To: Provider: 1 via Note to Provider  Intervention Detail: Lab(s) Ordered  Gap Closed?: Yes   Intervention Accepted By: Provider: 1  Time Spent (min): 15

## 2022-11-21 ENCOUNTER — TELEPHONE (OUTPATIENT)
Dept: PHARMACY | Facility: CLINIC | Age: 52
End: 2022-11-21

## 2022-11-21 NOTE — TELEPHONE ENCOUNTER
As of 11/4/22 patient has used $681 of the maximum of $600 a year in waived co pays for specific medications and pharmacy-related supplies through the [facility] [employer] [facility] [place] for the DM Program.    Patient currently enrolled in the DM Program and has used all of the maximum of $600 a year in waived co pays for specific medications and pharmacy-related supplies through the [address] [employer]. Courtesy call placed to patient to advise them of the above information. Spoke to patient and advised them of the above information. Patient verified understanding.       Stella Gongora, [address]   Phone: [phone], option #3       For Pharmacy Admin Tracking Only    CPA in place:  No  Time Spent (min): 5

## 2022-11-21 NOTE — TELEPHONE ENCOUNTER
Spoke to patient who states she is getting lab work done on Dec 5th and will also get urine test done then.      Ky Linton, PharmD, [address] Pharmacist  Department: [phone]

## 2022-12-06 ENCOUNTER — TELEPHONE (OUTPATIENT)
Dept: PHARMACY | Age: 52
End: 2022-12-06

## 2022-12-06 NOTE — TELEPHONE ENCOUNTER
Patient was a no call/no show for Diabetic appointment today. Called and left voice mail to reschedule. Patient is due for both A1C and urine microalbumin. Encouraged her to go to lab to get these done since orders are in the system and we can review results at next appointment.

## 2022-12-15 RX ORDER — SEMAGLUTIDE 1.34 MG/ML
INJECTION, SOLUTION SUBCUTANEOUS
Qty: 3 ML | Refills: 2 | Status: SHIPPED | OUTPATIENT
Start: 2022-12-15

## 2022-12-15 NOTE — TELEPHONE ENCOUNTER
Patient requests refill on Ozempic to [facility] pharmacy.     For Pharmacy Admin Tracking Only    Intervention Detail: Refill(s) Provided  Total # of Interventions Recommended: 1  Total # of Interventions Accepted: 1  Time Spent (min): 15

## 2022-12-22 ENCOUNTER — HOSPITAL ENCOUNTER (OUTPATIENT)
Age: 52
Discharge: HOME | End: 2022-12-22
Payer: COMMERCIAL

## 2022-12-22 DIAGNOSIS — E11.9 TYPE 2 DIABETES MELLITUS WITHOUT COMPLICATION, WITH LONG-TERM CURRENT USE OF INSULIN (HCC): ICD-10-CM

## 2022-12-22 DIAGNOSIS — E11.65 TYPE 2 DIABETES MELLITUS WITH HYPERGLYCEMIA, WITH LONG-TERM CURRENT USE OF INSULIN (HCC): ICD-10-CM

## 2022-12-22 DIAGNOSIS — Z79.4 TYPE 2 DIABETES MELLITUS WITHOUT COMPLICATION, WITH LONG-TERM CURRENT USE OF INSULIN (HCC): ICD-10-CM

## 2022-12-22 DIAGNOSIS — Z79.4 TYPE 2 DIABETES MELLITUS WITH HYPERGLYCEMIA, WITH LONG-TERM CURRENT USE OF INSULIN (HCC): ICD-10-CM

## 2022-12-22 LAB
CREATININE URINE: 86.9 MG/DL (ref 28–217)
ESTIMATED AVERAGE GLUCOSE: 169 MG/DL
HBA1C MFR BLD: 7.5 % (ref 4–6)
MICROALBUMIN/CREAT 24H UR: 13 MG/L
MICROALBUMIN/CREAT UR-RTO: 15 MCG/MG CREAT

## 2022-12-22 PROCEDURE — 36415 COLL VENOUS BLD VENIPUNCTURE: CPT

## 2022-12-22 PROCEDURE — 83036 HEMOGLOBIN GLYCOSYLATED A1C: CPT

## 2022-12-22 PROCEDURE — 82043 UR ALBUMIN QUANTITATIVE: CPT

## 2022-12-22 PROCEDURE — 82570 ASSAY OF URINE CREATININE: CPT

## 2023-01-10 ENCOUNTER — TELEPHONE (OUTPATIENT)
Dept: PHARMACY | Facility: CLINIC | Age: 53
End: 2023-01-10

## 2023-01-10 NOTE — TELEPHONE ENCOUNTER
2023 Annual Pharmacist Visit **Patient is [facility]**     Called patient to schedule 2023 yearly pharmacist appointment to discuss medications for Diabetes Management Program.     Spoke to patient and appointment scheduled for 2/1/23 at [address] [place] [address].    [address] [place] [address] free: [phone]      For Pharmacy Admin Tracking Only    Program: [address] in place:  No  Recommendation Provided To: Patient/Caregiver: 1 via Telephone  Intervention Detail: Scheduled Appointment  Intervention Accepted By: Patient/Caregiver: 1  Gap Closed?: Yes   Time Spent (min): 10

## 2023-01-13 RX ORDER — SEMAGLUTIDE 1.34 MG/ML
INJECTION, SOLUTION SUBCUTANEOUS
Qty: 9 ML | Refills: 1 | Status: SHIPPED | OUTPATIENT
Start: 2023-01-13

## 2023-02-01 ENCOUNTER — TELEPHONE (OUTPATIENT)
Dept: PHARMACY | Facility: CLINIC | Age: 53
End: 2023-02-01

## 2023-02-01 NOTE — TELEPHONE ENCOUNTER
[facility]– [place] CLINICAL PHARMACY REVIEW - Be Well with Diabetes    Juma Ratliff is a 47 y.o. female enrolled in the [address] [place], [place] [address]Th [facility] Employee Diabetes Program. Patient provided Eloise Anthony with verbal consent to remain in the program for this year. Patient enrolled 2020. Insurance through the following employer: [address] [place] [address]    Medications:  Current Outpatient Medications   Medication Instructions    ASPIRIN LOW DOSE 81 MG EC tablet TAKE 1 TABLET BY MOUTH ONE TIME A DAY    atorvastatin (LIPITOR) 20 MG tablet TAKE 1 TABLET BY MOUTH ONE TIME A DAY    Continuous Blood Gluc Sensor (DEXCOM G6 SENSOR) MISC CHANGE EVERY 10 DAYS AS DIRECTED FOR CONTINUOUS GLUCOSE MONITORING    Continuous Blood Gluc Sensor (DEXCOM G6 SENSOR) MISC Change every 10 days as directed for continuous glucose monitoring    Continuous Blood Gluc Transmit (DEXCOM G6 TRANSMITTER) MISC Change every 90 days for continuous glucose monitoring    FLOWFLEX COVID-19 AG HOME TEST KIT No dose, route, or frequency recorded.     gemfibrozil (LOPID) 600 MG tablet TAKE 1 TABLET BY MOUTH 2 TIMES A DAY    Icosapent Ethyl (VASCEPA) 1 g CAPS capsule TAKE TWO CAPSULES BY MOUTH TWICE A DAY    insulin lispro (1 Unit Dial) (HUMALOG/ADMELOG) 10 Units, SubCUTAneous, 3 TIMES DAILY WITH MEALS    Insulin Pen Needle 29G X 12MM MISC Use to inject insulin 5 times daily    LANTUS SOLOSTAR 100 UNIT/ML injection pen INJECT 50 UNITS INTO THE SKIN TWO TIMES A DAY    Lantus SoloStar 50 Units, SubCUTAneous, 2 TIMES DAILY    levothyroxine (SYNTHROID) 88 MCG tablet TAKE 1 TABLET BY MOUTH ONE TIME A DAY    lisinopril-hydroCHLOROthiazide (PRINZIDE;ZESTORETIC) 10-12.5 MG per tablet TAKE 1 TABLET BY MOUTH ONE TIME A DAY    metFORMIN (GLUCOPHAGE) 1000 MG tablet TAKE 1 TABLET BY MOUTH 2 TIMES A DAY WITH MEALS    Multiple Vitamins-Minerals (THERAPEUTIC MULTIVITAMIN-MINERALS) tablet 1 tablet, Oral, DAILY    omeprazole (PRILOSEC) 20 MG delayed release capsule TAKE 1 CAPSULE BY MOUTH ONE TIME A DAY    ondansetron (ZOFRAN-ODT) 4 mg, Oral, 3 TIMES DAILY PRN    Prodigy Lancets 28G MISC Use to test blood sugar up to 5 times daily    Semaglutide, 1 MG/DOSE, (OZEMPIC, 1 MG/DOSE,) 4 MG/3ML SOPN Inject 1mg once weekly as directed. spironolactone (ALDACTONE) 25 MG tablet TAKE 1 TABLET BY MOUTH ONE TIME A DAY    vitamin B-12 (CYANOCOBALAMIN) 500 mcg, Oral, DAILY    vitamin D (CHOLECALCIFEROL) 1,000 Units, Oral, DAILY     Current Pharmacy: [facility]  Current testing supplies/frequency: Prodigy and Dexcom G6   Pen needles/syringes: has supply    Allergies: Allergies   Allergen Reactions    Codeine     Tricor [Fenofibrate]       Vitals/Labs:  BP Readings from Last 3 Encounters:   11/15/22 95/70   10/04/22 110/72   09/30/22 108/72     Lab Results   Component Value Date    LABMICR 15 12/22/2022     Lab Results   Component Value Date    LABA1C 7.5 (H) 12/22/2022    LABA1C 8.9 (H) 09/06/2022    LABA1C 9.0 03/15/2022     Lab Results   Component Value Date    CHOL 119 09/06/2022    TRIG 267 (H) 09/06/2022    HDL 26 (L) 09/06/2022    LDLCHOLESTEROL 40 09/06/2022    LDLDIRECT 52 06/26/2020     ALT   Date Value Ref Range Status   01/06/2021 18 5 - 33 U/L Final     AST   Date Value Ref Range Status   01/06/2021 16 <32 U/L Final     The ASCVD Risk score (Yary DK, et al., 2019) failed to calculate for the following reasons: The valid total cholesterol range is 130 to 320 mg/dL     Lab Results   Component Value Date    CREATININE 0.87 10/28/2022     Estimated Creatinine Clearance: 98 mL/min (based on SCr of 0.87 mg/dL).     Lab Results   Component Value Date/Time    LABGLOM >60 10/28/2022 02:05 PM    LABGLOM >60 01/06/2021 08:33 AM    LABGLOM >60 06/26/2020 08:06 AM     Immunizations:  Immunization History   Administered Date(s) Administered    COVID-19, MODERNA BLUE border, Primary or Immunocompromised, (age 12y+), IM, 100 mcg/0.5mL 04/01/2022    COVID-19, PFIZER Bivalent BOOSTER, DO NOT Dilute, (age 12y+), IM, 30 mcg/0.3 mL 10/26/2022    COVID-19, PFIZER PURPLE top, DILUTE for use, (age 15 y+), 30mcg/0.3mL 12/18/2020, 01/08/2021, 10/05/2021    DT (pediatric) 01/01/1998    Hepatitis B 02/08/1993    Influenza A ([id number]) Vaccine PF IM 10/28/2009    Influenza Virus Vaccine 12/10/2007, 01/15/2010, 10/27/2015, 10/08/2016, 10/14/2020, 10/11/2021    Influenza, FLUARIX, FLULAVAL, FLUZONE (age 10 mo+) AND AFLURIA, (age 1 y+), PF, 0.5mL 11/20/2017, 02/01/2019    Influenza, FLUCELVAX, (age 10 mo+), MDCK, PF, 0.5mL 01/08/2020    Pneumococcal Polysaccharide ([id number]) 11/20/2015    Tdap (Boostrix, Adacel) 01/15/2009, 02/26/2020      Social History:  Social History     Tobacco Use    Smoking status: Never    Smokeless tobacco: Never   Substance Use Topics    Alcohol use: Yes     ASSESSMENT:  Initial Program Requirements (Y indicates has completed for the year, N indicates needs to be completed by 07/01/2023): No - Provider Visit for DM (1st)  No- A1c (1st)    Ongoing Program Requirements (Y indicates has completed for the year, N indicates needs to be completed by 12/31/2023): No - Provider Visit for DM (2nd)  No - ACC/diabetes educator visit (if A1c over 8%)- sees med management clinic pharmacist  No - A1c (2nd)  No - Lipid panel  No - Urine microalbumin  No - Pneumococcal vaccination: MAY get [id number]  No - Influenza vaccination for Fall 2023  No - Medication adherence over 70%  Yes - On statin - atorvastatin  Yes - On ACEi/ARB - lisinopril/hctz     Current medications eligible for copay waiver, up to $600, through [employer]:  - aspirin, atorvastatin, gemfibrozil, vascepa, lantus, levothyroxine, lisinopril/hctz, metformin, ozempic, spironolactone  - Prodigy and Dexcom G6     Diabetes Care:   - Glycemic Goal: <7.0% and directed by provider. Is not at blood glucose goal but is on insulin therapy.  Type 2 DM under inadequate control as evidenced by 7.5.  - Current symptoms/problems include none  - Home blood sugar records:  fasting range: 100-120 . Between dinner and bedtime is when it will elevate  - Any episodes of hypoglycemia? no  - Known diabetic complications: none  - Eye exam current (within one year): yes  - Foot exam current (within one year): yes  - Appropriateness of Insulin Therapy: basal and bolus per pcp   - Therapy Optimization: increase ozempic to 2 mg  - Medication changes since last A1c: lantus 60 units BID from 50 units BID but script still says 50 mg BID  - Medications/Classes already tried/failed: rybelsus (projectile vomiting)   - Daily aspirin? Yes  - Medication compliance: compliant most of the time. Only give humalog for high carb meals  - Diet compliance: compliant most of the time, during the week she is really regimented and eats the same 5 meals. Does salads, soups. On the weekends they do go out to eat. Did see dietician and diabetic educator. Has lost 65 pounds on rybelsus and dieting- has only gained back 10 pounds.      Other Considerations:  - Blood Pressure Goal: BP less than 130/80 mmHg due to history of DM: Is at blood pressure goal.   - Lipids:  tolerating atorva 20 mg  - Smoking status: Never smoked    PLAN:  - Consideration(s) for provider:   Refill dexcom transmitter  - DM program gaps identified:   Initial requirements: Provider Visit for DM (1st) and A1c (1st)   Ongoing requirements: Provider visit for DM (2nd), ACC/diabetes educator visit (if A1c over 8%), A1c (2nd), Lipid panel, Urine microalbumin, Pneumococcal vaccination: [id number], Influenza vaccination for [id number], and Medication adherence over 70%   - Education to patient: Addressed diet and exercise, Reminded to get yearly retinal exam, and Addressed medication adherence   - Follow up: PCP for identified gaps or as scheduled below  - Upcoming appointments:   Future Appointments   Date Time Provider Beck Campos   10/6/2023  7:30 AM DEVI Aviles - [facility]ST[facility]ste[facility]       Neil Amos PharmD, [address] Pharmacist  Department: [phone]    For Pharmacy Admin Tracking Only    Program: [address] [place] [address], [address]  Gap Closed?: Yes   Time Spent (min): 45

## 2023-02-03 ENCOUNTER — APPOINTMENT (OUTPATIENT)
Dept: GENERAL RADIOLOGY | Age: 53
DRG: 641 | End: 2023-02-03
Payer: COMMERCIAL

## 2023-02-03 ENCOUNTER — APPOINTMENT (OUTPATIENT)
Dept: CT IMAGING | Age: 53
DRG: 641 | End: 2023-02-03
Payer: COMMERCIAL

## 2023-02-03 ENCOUNTER — HOSPITAL ENCOUNTER (INPATIENT)
Age: 53
LOS: 1 days | Discharge: HOME | DRG: 641 | End: 2023-02-04
Attending: EMERGENCY MEDICINE | Admitting: INTERNAL MEDICINE
Payer: COMMERCIAL

## 2023-02-03 DIAGNOSIS — R55 VASOVAGAL SYNCOPE: ICD-10-CM

## 2023-02-03 DIAGNOSIS — R55 SYNCOPE AND COLLAPSE: Primary | ICD-10-CM

## 2023-02-03 LAB
ALBUMIN SERPL-MCNC: 4.8 G/DL (ref 3.5–5.2)
ALBUMIN/GLOBULIN RATIO: 1.5 (ref 1–2.5)
ALP SERPL-CCNC: 80 U/L (ref 35–104)
ALT SERPL-CCNC: 25 U/L (ref 5–33)
ANION GAP SERPL CALCULATED.3IONS-SCNC: 19 MMOL/L (ref 9–17)
AST SERPL-CCNC: 22 U/L
BILIRUB SERPL-MCNC: 0.3 MG/DL (ref 0.3–1.2)
BUN SERPL-MCNC: 14 MG/DL (ref 6–20)
CALCIUM SERPL-MCNC: 10.3 MG/DL (ref 8.6–10.4)
CHLORIDE SERPL-SCNC: 101 MMOL/L (ref 98–107)
CO2 SERPL-SCNC: 19 MMOL/L (ref 20–31)
CREAT SERPL-MCNC: 0.66 MG/DL (ref 0.5–0.9)
FLUAV AG SPEC QL: NEGATIVE
FLUBV AG SPEC QL: NEGATIVE
GFR SERPL CREATININE-BSD FRML MDRD: >60 ML/MIN/1.73M2
GLUCOSE BLD-MCNC: 161 MG/DL (ref 65–105)
GLUCOSE BLD-MCNC: 163 MG/DL (ref 65–105)
GLUCOSE SERPL-MCNC: 158 MG/DL (ref 70–99)
HCG QUALITATIVE: NEGATIVE
HCT VFR BLD AUTO: 39.4 % (ref 36.3–47.1)
HGB BLD-MCNC: 13.1 G/DL (ref 11.9–15.1)
LIPASE SERPL-CCNC: 49 U/L (ref 13–60)
MAGNESIUM SERPL-MCNC: 1.6 MG/DL (ref 1.6–2.6)
MCH RBC QN AUTO: 28.1 PG (ref 25.2–33.5)
MCHC RBC AUTO-ENTMCNC: 33.2 G/DL (ref 28.4–34.8)
MCV RBC AUTO: 84.4 FL (ref 82.6–102.9)
NRBC AUTOMATED: 0 PER 100 WBC
PDW BLD-RTO: 13 % (ref 11.8–14.4)
PLATELET # BLD AUTO: 430 K/UL (ref 138–453)
PMV BLD AUTO: 9.5 FL (ref 8.1–13.5)
POTASSIUM SERPL-SCNC: 3.3 MMOL/L (ref 3.7–5.3)
PROT SERPL-MCNC: 8.1 G/DL (ref 6.4–8.3)
RBC # BLD: 4.67 M/UL (ref 3.95–5.11)
SARS-COV-2 RDRP RESP QL NAA+PROBE: NOT DETECTED
SODIUM SERPL-SCNC: 139 MMOL/L (ref 135–144)
SPECIMEN DESCRIPTION: NORMAL
TROPONIN I SERPL DL<=0.01 NG/ML-MCNC: 11 NG/L (ref 0–14)
TROPONIN I SERPL DL<=0.01 NG/ML-MCNC: 11 NG/L (ref 0–14)
WBC # BLD AUTO: 16.9 K/UL (ref 3.5–11.3)

## 2023-02-03 PROCEDURE — 80053 COMPREHEN METABOLIC PANEL: CPT

## 2023-02-03 PROCEDURE — 84703 CHORIONIC GONADOTROPIN ASSAY: CPT

## 2023-02-03 PROCEDURE — 96375 TX/PRO/DX INJ NEW DRUG ADDON: CPT

## 2023-02-03 PROCEDURE — 93005 ELECTROCARDIOGRAM TRACING: CPT | Performed by: STUDENT IN AN ORGANIZED HEALTH CARE EDUCATION/TRAINING PROGRAM

## 2023-02-03 PROCEDURE — 70450 CT HEAD/BRAIN W/O DYE: CPT

## 2023-02-03 PROCEDURE — 83735 ASSAY OF MAGNESIUM: CPT

## 2023-02-03 PROCEDURE — 83690 ASSAY OF LIPASE: CPT

## 2023-02-03 PROCEDURE — 96361 HYDRATE IV INFUSION ADD-ON: CPT

## 2023-02-03 PROCEDURE — 2580000003 HC RX 258

## 2023-02-03 PROCEDURE — 6370000000 HC RX 637 (ALT 250 FOR IP)

## 2023-02-03 PROCEDURE — 6370000000 HC RX 637 (ALT 250 FOR IP): Performed by: STUDENT IN AN ORGANIZED HEALTH CARE EDUCATION/TRAINING PROGRAM

## 2023-02-03 PROCEDURE — 99285 EMERGENCY DEPT VISIT HI MDM: CPT

## 2023-02-03 PROCEDURE — 71260 CT THORAX DX C+: CPT | Performed by: STUDENT IN AN ORGANIZED HEALTH CARE EDUCATION/TRAINING PROGRAM

## 2023-02-03 PROCEDURE — 71046 X-RAY EXAM CHEST 2 VIEWS: CPT

## 2023-02-03 PROCEDURE — 96374 THER/PROPH/DIAG INJ IV PUSH: CPT

## 2023-02-03 PROCEDURE — 74177 CT ABD & PELVIS W/CONTRAST: CPT

## 2023-02-03 PROCEDURE — 87804 INFLUENZA ASSAY W/OPTIC: CPT

## 2023-02-03 PROCEDURE — 87635 SARS-COV-2 COVID-19 AMP PRB: CPT

## 2023-02-03 PROCEDURE — 2580000003 HC RX 258: Performed by: STUDENT IN AN ORGANIZED HEALTH CARE EDUCATION/TRAINING PROGRAM

## 2023-02-03 PROCEDURE — 85027 COMPLETE CBC AUTOMATED: CPT

## 2023-02-03 PROCEDURE — 82947 ASSAY GLUCOSE BLOOD QUANT: CPT

## 2023-02-03 PROCEDURE — 6360000002 HC RX W HCPCS: Performed by: STUDENT IN AN ORGANIZED HEALTH CARE EDUCATION/TRAINING PROGRAM

## 2023-02-03 PROCEDURE — 6360000002 HC RX W HCPCS

## 2023-02-03 PROCEDURE — 84484 ASSAY OF TROPONIN QUANT: CPT

## 2023-02-03 PROCEDURE — 2060000000 HC ICU INTERMEDIATE R&B

## 2023-02-03 PROCEDURE — 6360000004 HC RX CONTRAST MEDICATION: Performed by: FAMILY MEDICINE

## 2023-02-03 RX ORDER — DIPHENHYDRAMINE HYDROCHLORIDE 50 MG/ML
25 INJECTION INTRAMUSCULAR; INTRAVENOUS ONCE
Status: COMPLETED | OUTPATIENT
Start: 2023-02-03 | End: 2023-02-03

## 2023-02-03 RX ORDER — ATORVASTATIN CALCIUM 20 MG/1
20 TABLET, FILM COATED ORAL DAILY
Status: DISCONTINUED | OUTPATIENT
Start: 2023-02-04 | End: 2023-02-04 | Stop reason: HOSPADM

## 2023-02-03 RX ORDER — INSULIN LISPRO 100 [IU]/ML
0-8 INJECTION, SOLUTION INTRAVENOUS; SUBCUTANEOUS
Status: DISCONTINUED | OUTPATIENT
Start: 2023-02-04 | End: 2023-02-04 | Stop reason: HOSPADM

## 2023-02-03 RX ORDER — ASPIRIN 81 MG/1
81 TABLET ORAL DAILY
Qty: 90 TABLET | Refills: 1 | Status: SHIPPED | OUTPATIENT
Start: 2023-02-03

## 2023-02-03 RX ORDER — SODIUM CHLORIDE 0.9 % (FLUSH) 0.9 %
5-40 SYRINGE (ML) INJECTION PRN
Status: DISCONTINUED | OUTPATIENT
Start: 2023-02-03 | End: 2023-02-04 | Stop reason: HOSPADM

## 2023-02-03 RX ORDER — PANTOPRAZOLE SODIUM 40 MG/1
40 TABLET, DELAYED RELEASE ORAL
Status: DISCONTINUED | OUTPATIENT
Start: 2023-02-04 | End: 2023-02-04 | Stop reason: HOSPADM

## 2023-02-03 RX ORDER — OMEPRAZOLE 20 MG/1
CAPSULE, DELAYED RELEASE ORAL
Qty: 90 CAPSULE | Refills: 1 | OUTPATIENT
Start: 2023-02-03

## 2023-02-03 RX ORDER — LISINOPRIL AND HYDROCHLOROTHIAZIDE 12.5; 1 MG/1; MG/1
1 TABLET ORAL DAILY
Status: DISCONTINUED | OUTPATIENT
Start: 2023-02-04 | End: 2023-02-04 | Stop reason: HOSPADM

## 2023-02-03 RX ORDER — LEVOTHYROXINE SODIUM 88 UG/1
88 TABLET ORAL DAILY
Status: DISCONTINUED | OUTPATIENT
Start: 2023-02-04 | End: 2023-02-04 | Stop reason: HOSPADM

## 2023-02-03 RX ORDER — ASPIRIN 81 MG/1
81 TABLET ORAL DAILY
Status: DISCONTINUED | OUTPATIENT
Start: 2023-02-04 | End: 2023-02-04 | Stop reason: HOSPADM

## 2023-02-03 RX ORDER — ACETAMINOPHEN 325 MG/1
650 TABLET ORAL EVERY 6 HOURS PRN
Status: DISCONTINUED | OUTPATIENT
Start: 2023-02-03 | End: 2023-02-04 | Stop reason: HOSPADM

## 2023-02-03 RX ORDER — ONDANSETRON 2 MG/ML
4 INJECTION INTRAMUSCULAR; INTRAVENOUS EVERY 6 HOURS PRN
Status: DISCONTINUED | OUTPATIENT
Start: 2023-02-03 | End: 2023-02-04 | Stop reason: HOSPADM

## 2023-02-03 RX ORDER — ASPIRIN 81 MG/1
TABLET, COATED ORAL
Qty: 90 TABLET | Refills: 1 | OUTPATIENT
Start: 2023-02-03

## 2023-02-03 RX ORDER — SPIRONOLACTONE 25 MG/1
25 TABLET ORAL DAILY
Status: DISCONTINUED | OUTPATIENT
Start: 2023-02-04 | End: 2023-02-04 | Stop reason: HOSPADM

## 2023-02-03 RX ORDER — ACETAMINOPHEN 650 MG/1
650 SUPPOSITORY RECTAL EVERY 6 HOURS PRN
Status: DISCONTINUED | OUTPATIENT
Start: 2023-02-03 | End: 2023-02-04 | Stop reason: HOSPADM

## 2023-02-03 RX ORDER — 0.9 % SODIUM CHLORIDE 0.9 %
1000 INTRAVENOUS SOLUTION INTRAVENOUS ONCE
Status: COMPLETED | OUTPATIENT
Start: 2023-02-03 | End: 2023-02-03

## 2023-02-03 RX ORDER — POLYETHYLENE GLYCOL 3350 17 G/17G
17 POWDER, FOR SOLUTION ORAL DAILY PRN
Status: DISCONTINUED | OUTPATIENT
Start: 2023-02-03 | End: 2023-02-04 | Stop reason: HOSPADM

## 2023-02-03 RX ORDER — ONDANSETRON 4 MG/1
4 TABLET, ORALLY DISINTEGRATING ORAL EVERY 8 HOURS PRN
Status: DISCONTINUED | OUTPATIENT
Start: 2023-02-03 | End: 2023-02-04 | Stop reason: HOSPADM

## 2023-02-03 RX ORDER — DEXTROSE MONOHYDRATE 100 MG/ML
INJECTION, SOLUTION INTRAVENOUS CONTINUOUS PRN
Status: DISCONTINUED | OUTPATIENT
Start: 2023-02-03 | End: 2023-02-04 | Stop reason: HOSPADM

## 2023-02-03 RX ORDER — OMEPRAZOLE 20 MG/1
CAPSULE, DELAYED RELEASE ORAL
Qty: 90 CAPSULE | Refills: 1 | Status: SHIPPED | OUTPATIENT
Start: 2023-02-03

## 2023-02-03 RX ORDER — SODIUM CHLORIDE 0.9 % (FLUSH) 0.9 %
5-40 SYRINGE (ML) INJECTION EVERY 12 HOURS SCHEDULED
Status: DISCONTINUED | OUTPATIENT
Start: 2023-02-03 | End: 2023-02-04 | Stop reason: HOSPADM

## 2023-02-03 RX ORDER — INSULIN LISPRO 100 [IU]/ML
0-4 INJECTION, SOLUTION INTRAVENOUS; SUBCUTANEOUS NIGHTLY
Status: DISCONTINUED | OUTPATIENT
Start: 2023-02-03 | End: 2023-02-04 | Stop reason: HOSPADM

## 2023-02-03 RX ORDER — ONDANSETRON 2 MG/ML
4 INJECTION INTRAMUSCULAR; INTRAVENOUS ONCE
Status: COMPLETED | OUTPATIENT
Start: 2023-02-03 | End: 2023-02-03

## 2023-02-03 RX ORDER — ENOXAPARIN SODIUM 100 MG/ML
40 INJECTION SUBCUTANEOUS DAILY
Status: DISCONTINUED | OUTPATIENT
Start: 2023-02-03 | End: 2023-02-04 | Stop reason: HOSPADM

## 2023-02-03 RX ORDER — SODIUM CHLORIDE, SODIUM LACTATE, POTASSIUM CHLORIDE, CALCIUM CHLORIDE 600; 310; 30; 20 MG/100ML; MG/100ML; MG/100ML; MG/100ML
INJECTION, SOLUTION INTRAVENOUS CONTINUOUS
Status: DISCONTINUED | OUTPATIENT
Start: 2023-02-03 | End: 2023-02-04

## 2023-02-03 RX ORDER — PROCHLORPERAZINE EDISYLATE 5 MG/ML
10 INJECTION INTRAMUSCULAR; INTRAVENOUS ONCE
Status: COMPLETED | OUTPATIENT
Start: 2023-02-03 | End: 2023-02-03

## 2023-02-03 RX ORDER — SODIUM CHLORIDE 9 MG/ML
INJECTION, SOLUTION INTRAVENOUS PRN
Status: DISCONTINUED | OUTPATIENT
Start: 2023-02-03 | End: 2023-02-04 | Stop reason: HOSPADM

## 2023-02-03 RX ADMIN — ONDANSETRON 4 MG: 2 INJECTION INTRAMUSCULAR; INTRAVENOUS at 11:30

## 2023-02-03 RX ADMIN — ACETAMINOPHEN 650 MG: 325 TABLET ORAL at 20:14

## 2023-02-03 RX ADMIN — IOPAMIDOL 75 ML: 755 INJECTION, SOLUTION INTRAVENOUS at 14:28

## 2023-02-03 RX ADMIN — SODIUM CHLORIDE, PRESERVATIVE FREE 10 ML: 5 INJECTION INTRAVENOUS at 20:15

## 2023-02-03 RX ADMIN — DIPHENHYDRAMINE HYDROCHLORIDE 25 MG: 50 INJECTION, SOLUTION INTRAMUSCULAR; INTRAVENOUS at 15:44

## 2023-02-03 RX ADMIN — SODIUM CHLORIDE, POTASSIUM CHLORIDE, SODIUM LACTATE AND CALCIUM CHLORIDE: 600; 310; 30; 20 INJECTION, SOLUTION INTRAVENOUS at 23:40

## 2023-02-03 RX ADMIN — PROCHLORPERAZINE EDISYLATE 10 MG: 5 INJECTION INTRAMUSCULAR; INTRAVENOUS at 15:44

## 2023-02-03 RX ADMIN — SODIUM CHLORIDE 1000 ML: 9 INJECTION, SOLUTION INTRAVENOUS at 15:24

## 2023-02-03 RX ADMIN — POTASSIUM BICARBONATE 40 MEQ: 782 TABLET, EFFERVESCENT ORAL at 16:04

## 2023-02-03 RX ADMIN — SODIUM CHLORIDE 1000 ML: 9 INJECTION, SOLUTION INTRAVENOUS at 11:24

## 2023-02-03 RX ADMIN — ENOXAPARIN SODIUM 40 MG: 100 INJECTION SUBCUTANEOUS at 21:30

## 2023-02-03 ASSESSMENT — PAIN - FUNCTIONAL ASSESSMENT: PAIN_FUNCTIONAL_ASSESSMENT: NONE - DENIES PAIN

## 2023-02-03 NOTE — ED NOTES
His urine today is infected.  Hx of recurrent urethral stricture.  Hx of Pseudomonas UTI.  Will cancel his surgery today and reschedule him for next week.    Please reschedule his surgery, cysto, laser DVIU for 1 hour next Weds on 6/20.    I will do urine culture today.  Treat him with gentamicin 1 dose and cipro for 1 wk.   The following labs were labeled with appropriate pt sticker and tubed to lab:     [x] Blue     [x] Lavender   [] on ice  [x] Green/yellow  [x] Green/black [] on ice  [] Summerfield Johnson  [] on ice  [] Yellow  [x] Red  [] Type/ Screen  [] ABG  [] VBG    [] COVID-19 swab    [] Rapid  [] PCR  [] Flu swab  [] Peds Viral Panel     [] Urine Sample  [] Fecal Sample  [] Pelvic Cultures  [] Blood Cultures  [] X 2  [] STREP Cultures     Yessenia Purdy RN  02/03/23 6395

## 2023-02-03 NOTE — H&P
[facility]     Department of Internal Medicine - Staff Internal Medicine Teaching Service          ADMISSION NOTE/HISTORY AND PHYSICAL EXAMINATION   Date: 2/3/2023  Patient Name: Phyllis Chen  Date of admission: 2/3/2023 11:06 AM  YOB: 1970  PCP: Aristides Cee MD  History Obtained From:  patient, electronic medical record    CHIEF COMPLAINT     Chief complaint: Presyncope, diarrhea    HISTORY OF PRESENTING ILLNESS     The patient is a pleasant 47 y.o. female with a past medical history significant for diabetes mellitus on insulin , GERD and hypothyroidism presents with a chief complaint of dizziness and diarrhea. Patient reported that she was in the bathroom and when she tried to sit up she felt dizzy and almost blacked out but he did not fell down. Patient also reported loose diarrhea. She denies loss of consciousness, palpitation, or chest pain. Patient reported that she had a dental crown put early morning the day of admission and when she went to her work he was somehow hungry. Patient denied any history of smoking, alcohol or illicit drugs use. In the ED glucose 163, potassium 3.3 replaced. WBC 16.9. Flu and COVID were negative. CT head CT chest were negative.   Orthostatic was positive      Review of Systems:  General ROS: Completed and except as mentioned above were negative   HEENT ROS: Completed and except as mentioned above were negative   Allergy and Immunology ROS:  Completed and except as mentioned above were negative  Hematological and Lymphatic ROS:  Completed and except as mentioned above were negative  Respiratory ROS:  Completed and except as mentioned above were negative  Cardiovascular ROS:  Completed and except as mentioned above were negative  Gastrointestinal ROS: Completed and except as mentioned above were negative  Genito-Urinary ROS:  Completed and except as mentioned above were negative  Musculoskeletal ROS:  Completed and except as mentioned above were negative  Neurological ROS:  Completed and except as mentioned above were negative  Skin & Dermatological ROS:  Completed and except as mentioned above were negative  Psychological ROS:  Completed and except as mentioned above were negative    PAST MEDICAL HISTORY     Past Medical History:   Diagnosis Date    Diabetes mellitus type 2, insulin dependent (HCC)     GERD (gastroesophageal reflux disease)     Hypothyroid        PAST SURGICAL HISTORY     Past Surgical History:   Procedure Laterality Date     SECTION       SECTION      ENDOMETRIAL ABLATION      TUBAL LIGATION      UMBILICAL HERNIA REPAIR         ALLERGIES     Codeine and Tricor [fenofibrate]    MEDICATIONS PRIOR TO ADMISSION     Prior to Admission medications    Medication Sig Start Date End Date Taking? Authorizing Provider   aspirin (ASPIRIN LOW DOSE) 81 MG EC tablet Take 1 tablet by mouth daily 2/3/23   Demetria Alarcon MD   omeprazole (PRILOSEC) 20 MG delayed release capsule TAKE 1 CAPSULE BY MOUTH ONE TIME A DAY 2/3/23   Demetria Alarcon MD   Semaglutide, 1 MG/DOSE, (OZEMPIC, 1 MG/DOSE,) 4 MG/3ML SOPN Inject 1mg once weekly as directed.  23   Demetria Alarcon MD   Continuous Blood Gluc Transmit (DEXCOM G6 TRANSMITTER) MISC Change every 90 days for continuous glucose monitoring 10/27/22   Demetria Alarcon MD   LANTUS SOLOSTAR 100 UNIT/ML injection pen INJECT 50 UNITS INTO THE SKIN TWO TIMES A DAY 10/19/22   Demetria Alarcon MD   Continuous Blood Gluc Sensor (DEXCOM G6 SENSOR) MISC CHANGE EVERY 10 DAYS AS DIRECTED FOR CONTINUOUS GLUCOSE MONITORING 10/19/22   Demetria Alarcon MD   Continuous Blood Gluc Sensor (DEXCOM G6 SENSOR) MISC Change every 10 days as directed for continuous glucose monitoring 10/19/22   Demetria Alarcon MD   insulin glargine (LANTUS SOLOSTAR) 100 UNIT/ML injection pen Inject 50 Units into the skin 2 times daily  Patient taking differently: Inject 60 Units into the skin 2 times daily 10/19/22   Betsey Giles MD   insulin lispro, 1 Unit Dial, (HUMALOG/ADMELOG) 100 UNIT/ML SOPN Inject 10 Units into the skin 3 times daily (with meals) 10/19/22   Betsey Giles MD   Icosapent Ethyl (VASCEPA) 1 g CAPS capsule TAKE TWO CAPSULES BY MOUTH TWICE A DAY 10/18/22   Betsey Giles MD   Insulin Pen Needle 29G X 12MM MISC Use to inject insulin 5 times daily 10/5/22   Betsey Giles MD   [address] COVID-19 [address] TEST KIT  9/20/22   Historical Provider, MD   levothyroxine (SYNTHROID) 88 MCG tablet TAKE 1 TABLET BY MOUTH ONE TIME A DAY 4/25/22   Betsey Giles MD   atorvastatin (LIPITOR) 20 MG tablet TAKE 1 TABLET BY MOUTH ONE TIME A DAY 4/19/22   Betsey Giles MD   gemfibrozil (LOPID) 600 MG tablet TAKE 1 TABLET BY MOUTH 2 TIMES A DAY 4/19/22   Betsey Giles MD   metFORMIN (GLUCOPHAGE) 1000 MG tablet TAKE 1 TABLET BY MOUTH 2 TIMES A DAY WITH MEALS 4/19/22   Betsey Giles MD   lisinopril-hydroCHLOROthiazide (PRINZIDE;ZESTORETIC) 10-12.5 MG per tablet TAKE 1 TABLET BY MOUTH ONE TIME A DAY 4/19/22   Betsey Giles MD   spironolactone (ALDACTONE) 25 MG tablet TAKE 1 TABLET BY MOUTH ONE TIME A DAY 4/19/22   Betsey Giles MD   Prodigy Lancets 28G MISC Use to test blood sugar up to 5 times daily 9/27/20   Jass Monroy, DO   Multiple Vitamins-Minerals (THERAPEUTIC MULTIVITAMIN-MINERALS) tablet Take 1 tablet by mouth daily    Historical Provider, MD   vitamin D (CHOLECALCIFEROL) 25 MCG (1000 UT) TABS tablet Take 1,000 Units by mouth daily    Historical Provider, MD   vitamin B-12 (CYANOCOBALAMIN) 500 MCG tablet Take 500 mcg by mouth daily    Historical Provider, MD       SOCIAL HISTORY     Tobacco: Denied  Alcohol: Denied  Illicits: Denied  Occupation: Nurse practitioner    FAMILY HISTORY     Family History   Problem Relation Age of Onset    Ovarian Cancer Mother     Other Father         intracranial hemorrhage     Ovarian Cancer Maternal Grandmother     Stroke Maternal Grandmother Diabetes Maternal Grandfather     Rheum Arthritis Paternal Grandmother     Diabetes Paternal Grandfather     High Blood Pressure Paternal Grandfather     Colon Cancer Paternal Grandfather     Colon Cancer Brother     Cancer Brother         AML       PHYSICAL EXAM     Vitals: /76   Pulse 87   Temp 98 °F (36.7 °C) (Oral)   Resp 22   SpO2 92%   Tmax: Temp (24hrs), Av °F (36.7 °C), Min:98 °F (36.7 °C), Max:98 °F (36.7 °C)    Last Body weight:   Wt Readings from Last 3 Encounters:   11/15/22 226 lb 8 oz (102.7 kg)   10/04/22 231 lb 6.4 oz (105 kg)   22 230 lb (104.3 kg)     Body Mass Index : There is no height or weight on file to calculate BMI. PHYSICAL EXAMINATION:  Constitutional: This is a well developed, well nourished, 47y.o. year old female who is alert, oriented, cooperative and in no apparent distress. Head:normocephalic and atraumatic. EENT:  PERRLA. No conjunctival injections. Septum was midline, mucosa was without erythema, exudates or cobblestoning. No thrush was noted. Neck: Supple without thyromegaly. No elevated JVP. Trachea was midline. Respiratory: Chest was symmetrical without dullness to percussion. Breath sounds bilaterally were clear to auscultation. There were no wheezes, rhonchi or rales. There is no intercostal retraction or use of accessory muscles. No egophony noted. Cardiovascular: Regular without murmur, clicks, gallops or rubs. Abdomen: Slightly rounded and soft without organomegaly. No rebound, rigidity or guarding was appreciated. Lymphatic: No lymphadenopathy. Musculoskeletal: Normal curvature of the spine. No gross muscle weakness. Extremities:  No lower extremity edema, ulcerations, tenderness, varicosities or erythema. Muscle size, tone and strength are normal.  No involuntary movements are noted. Skin:  Warm and dry. Good color, turgor and pigmentation. No lesions or scars.   No cyanosis or clubbing  Neurological/Psychiatric: The patient's general behavior, level of consciousness, thought content and emotional status is normal.          INVESTIGATIONS     Laboratory Testing:     Recent Results (from the past 24 hour(s))   COVID-19, Rapid    Collection Time: 02/03/23 11:35 AM    Specimen: Nasopharyngeal Swab   Result Value Ref Range    Specimen Description . NASOPHARYNGEAL SWAB     SARS-CoV-2, Rapid Not Detected Not Detected   CBC    Collection Time: 02/03/23 11:56 AM   Result Value Ref Range    WBC 16.9 (H) 3.5 - 11.3 k/uL    RBC 4.67 3.95 - 5.11 m/uL    Hemoglobin 13.1 11.9 - 15.1 g/dL    Hematocrit 39.4 36.3 - 47.1 %    MCV 84.4 82.6 - 102.9 fL    MCH 28.1 25.2 - 33.5 pg    MCHC 33.2 28.4 - 34.8 g/dL    RDW 13.0 11.8 - 14.4 %    Platelets 646 965 - 150 k/uL    MPV 9.5 8.1 - 13.5 fL    NRBC Automated 0.0 0.0 per 100 WBC   Comprehensive Metabolic Panel    Collection Time: 02/03/23 11:56 AM   Result Value Ref Range    Glucose 158 (H) 70 - 99 mg/dL    BUN 14 6 - 20 mg/dL    Creatinine 0.66 0.50 - 0.90 mg/dL    Est, Glom Filt Rate >60 >60 mL/min/1.73m2    Calcium 10.3 8.6 - 10.4 mg/dL    Sodium 139 135 - 144 mmol/L    Potassium 3.3 (L) 3.7 - 5.3 mmol/L    Chloride 101 98 - 107 mmol/L    CO2 19 (L) 20 - 31 mmol/L    Anion Gap 19 (H) 9 - 17 mmol/L    Alkaline Phosphatase 80 35 - 104 U/L    ALT 25 5 - 33 U/L    AST 22 <32 U/L    Total Bilirubin 0.3 0.3 - 1.2 mg/dL    Total Protein 8.1 6.4 - 8.3 g/dL    Albumin 4.8 3.5 - 5.2 g/dL    Albumin/Globulin Ratio 1.5 1.0 - 2.5   Troponin    Collection Time: 02/03/23 11:56 AM   Result Value Ref Range    Troponin, High Sensitivity 11 0 - 14 ng/L   Lipase    Collection Time: 02/03/23 11:56 AM   Result Value Ref Range    Lipase 49 13 - 60 U/L   Magnesium    Collection Time: 02/03/23 11:56 AM   Result Value Ref Range    Magnesium 1.6 1.6 - 2.6 mg/dL   Rapid influenza A/B antigens    Collection Time: 02/03/23 12:15 PM    Specimen: Nasopharyngeal   Result Value Ref Range    Flu A Antigen NEGATIVE NEGATIVE Flu B Antigen NEGATIVE NEGATIVE   Troponin    Collection Time: 02/03/23  1:51 PM   Result Value Ref Range    Troponin, High Sensitivity 11 0 - 14 ng/L   HCG Qualitative, Serum    Collection Time: 02/03/23  1:51 PM   Result Value Ref Range    hCG Qual NEGATIVE NEGATIVE   POC Glucose Fingerstick    Collection Time: 02/03/23  3:16 PM   Result Value Ref Range    POC Glucose 161 (H) 65 - 105 mg/dL       Imaging:   XR CHEST (2 VW)    Result Date: 2/3/2023  Unchanged appearance of the chest without acute airspace disease identified. CT HEAD WO CONTRAST    Result Date: 2/3/2023  No acute intracranial abnormality. CT ABDOMEN PELVIS W IV CONTRAST Additional Contrast? None    Result Date: 2/3/2023  1. No evidence of pulmonary embolism or acute pulmonary abnormality. 2.  No acute inflammatory process identified in the abdomen or pelvis. Punctate gallstones. 3.  Hepatosplenomegaly. RECOMMENDATIONS: Unavailable     CT CHEST PULMONARY EMBOLISM W CONTRAST    Result Date: 2/3/2023  1. No evidence of pulmonary embolism or acute pulmonary abnormality. 2.  No acute inflammatory process identified in the abdomen or pelvis. Punctate gallstones. 3.  Hepatosplenomegaly. RECOMMENDATIONS: Unavailable       ASSESSMENT & PLAN     ASSESSMENT / PLAN:     IMPRESSION  This is a 47 y.o. female who presented with Dizziness and diarrhea and found to have Orthostatic hypotension. Principal Problem:  Orthostatic hypotension  -Started on IV fluids LR 50 mL/h  -Replace electrolytes as needed    Diabetes mellitus  -Use Lantus at home  -Started on medium dose sliding scale  -Hypoglycemia protocol initiated    Hypothyroidism  -Resume levothyroxine 88 mg    Hyperlipidemia  - Lipitor 20 mg. GERD  -Home omeprazole    DVT ppx: Lovenox  GI ppx: Resumed omeprazole    PT/OT: Consulted  Discharge Planning:  consulted    Micha Wiseman MD  Internal Medicine Resident, PGY-1  [facility];  [place], [place]  2/3/2023, 5:18 PM

## 2023-02-03 NOTE — ED NOTES
Dr. Rollene Brunner at bedside to evaluate the patient     Geraldo Serrano, MICHOACANO  02/03/23 4877

## 2023-02-03 NOTE — PROGRESS NOTES
[facility]     Emergency/Trauma Note    PATIENT NAME: Edgard Boyer    Shift date: 02/03/2023  Shift day: Friday   Shift # 1    Room # 29/29     Name: Edgard Boyer            Age: 47 y.o. Gender: female          Jehovah's witness: [address] of [employer]:     Trauma/Incident type: Rapid Response  Admit Date & Time: 2/3/2023 11:06 AM  TRAUMA NAME: None    ADVANCE DIRECTIVES IN CHART? No    NAME OF DECISION MAKER: Unknown    RELATIONSHIP OF DECISION MAKER TO PATIENT:     PATIENT/EVENT DESCRIPTION:  Edgard Boyer is a 47 y.o. female who arrived ED as rapid response. Patient was at work when it happened. Patient to be admitted to 29/29. SPIRITUAL ASSESSMENT-INTERVENTION-OUTCOME:  Patient was very receptive to spiritual and open to prayer. Patient was raised [employer]. Patient declined anointing of the sick. Family was  not present at the time. However, patient hinted that her  was on his way.  provided ministry of presence, offered support and prayed with patient. Patient expressed appreciation for the blessing she received. PATIENT BELONGINGS:  No belongings noted    ANY BELONGINGS OF SIGNIFICANT VALUE NOTED:  Unknown    REGISTRATION STAFF NOTIFIED? Yes    WHAT IS YOUR SPIRITUAL CARE PLAN FOR THIS PATIENT?:  Follow up visits recommended for more prayers and support. Electronically signed by Fr. Hitesh Hook on 2/3/2023 at 11:44 AM.  Ruel Blankenship  [phone]

## 2023-02-03 NOTE — ED NOTES
Patient desatting 88-89% on RA. Patient placed on 2L NC oxygen with slight increase to 91%, oxygen increased to 3L NC.      Kelly Hubbard RN  02/03/23 4906

## 2023-02-03 NOTE — ED NOTES
The following labs were labeled with appropriate pt sticker and tubed to lab:     [] Blue     [] Lavender   [] on ice  [x] Green/yellow  [] Green/black [] on ice  [] Ladi Chai  [] on ice  [] Yellow  [] Red  [] Type/ Screen  [] ABG  [] VBG    [] COVID-19 swab    [] Rapid  [] PCR  [] Flu swab  [] Peds Viral Panel     [] Urine Sample  [] Fecal Sample  [] Pelvic Cultures  [] Blood Cultures  [] X 2  [] STREP Cultures     Delma Sim RN  02/03/23 2888

## 2023-02-03 NOTE — ED NOTES
Patient returned from [address] [facility] [facility]  02/03/23 5219

## 2023-02-03 NOTE — ED NOTES
Dr. Court Knight at bedside to update pt and family on plan of care. Pt to be admitted.       Efrem Pacheco RN  02/03/23 3525

## 2023-02-03 NOTE — ED PROVIDER NOTES
[facility] ED  Emergency Department Encounter  Emergency Medicine Resident     Pt Kelle Cannon Dalila Hazel  MRN: [id number]  [place] 1970  Date of evaluation: 2/3/23  PCP:  John Casiano MD      [address] [place] [address]       Chief Complaint   Patient presents with    Loss of Consciousness    Emesis    Dizziness       HISTORY OF PRESENT ILLNESS  (Location/Symptom, Timing/Onset, Context/Setting, Quality, Duration, Modifying Factors, Severity.)      Vanita Phalen is a 47 y.o. female who presents today for evaluation nausea lightheadedness and syncope. The patient was at work here today when she felt a sensation of passing out she was going up and on the elevator. She sat down to have a bowel movement at the toilet and passed out. She was found on the ground with stool and vomit all over herself. Earlier in the day she had a crown placed with the dentist team.  She states that her face was still numb from that at the time. Otherwise last night she had noted that she had a little bit of an upset stomach. No chest pain or shortness of breath. History of insulin-dependent diabetes. PAST MEDICAL / SURGICAL / SOCIAL / FAMILY HISTORY      has a past medical history of Diabetes mellitus type 2, insulin dependent ([address] GERD (gastroesophageal reflux disease), and Hypothyroid. has a past surgical history that includes  section (); Tubal ligation;  section (); Endometrial ablation; and Umbilical hernia repair.       Social History     Socioeconomic History    Marital status:      Spouse name: Not on file    Number of children: Not on file    Years of education: Not on file    Highest education level: Not on file   Occupational History    Not on file   Tobacco Use    Smoking status: Never    Smokeless tobacco: Never   Vaping Use    Vaping Use: Never used   Substance and Sexual Activity    Alcohol use: Yes    Drug use: Never    Sexual activity: Yes     Comment: tubal    Other Topics Concern    Not on file   Social History Narrative    Not on file     Social Determinants of Health     Financial Resource Strain: Low Risk     Difficulty of Paying Living Expenses: Not hard at all   Food Insecurity: No Food Insecurity    Worried About Running Out of Food in the Last Year: Never true    Ran Out of Food in the Last Year: Never true   Transportation Needs: Not on file   Physical Activity: Not on file   Stress: Not on file   Social Connections: Not on file   Intimate Partner Violence: Not on file   Housing Stability: Not on file       Family History   Problem Relation Age of Onset    Ovarian Cancer Mother     Other Father         intracranial hemorrhage     Ovarian Cancer Maternal Grandmother     Stroke Maternal Grandmother     Diabetes Maternal Grandfather     Rheum Arthritis Paternal Grandmother     Diabetes Paternal Grandfather     High Blood Pressure Paternal Grandfather     Colon Cancer Paternal Grandfather     Colon Cancer Brother     Cancer Brother         AML       Allergies:  Codeine and Tricor [fenofibrate]    Home Medications:  Prior to Admission medications    Medication Sig Start Date End Date Taking? Authorizing Provider   aspirin (ASPIRIN LOW DOSE) 81 MG EC tablet Take 1 tablet by mouth daily 2/3/23   Kemal Monroe MD   omeprazole (PRILOSEC) 20 MG delayed release capsule TAKE 1 CAPSULE BY MOUTH ONE TIME A DAY 2/3/23   Kemal Monroe MD   Semaglutide, 1 MG/DOSE, (OZEMPIC, 1 MG/DOSE,) 4 MG/3ML SOPN Inject 1mg once weekly as directed.  1/13/23   Kemal Monroe MD   Continuous Blood Gluc Transmit (DEXCOM G6 TRANSMITTER) MISC Change every 90 days for continuous glucose monitoring 10/27/22   Kemal Monroe MD   LANTUS SOLOSTAR 100 UNIT/ML injection pen INJECT 50 UNITS INTO THE SKIN TWO TIMES A DAY 10/19/22   Kemal Monroe MD   Continuous Blood Gluc Sensor (DEXCOM G6 SENSOR) MISC CHANGE EVERY 10 DAYS AS DIRECTED FOR CONTINUOUS GLUCOSE MONITORING 10/19/22   Ezequiel Olson Dusty Llanos MD   Continuous Blood Gluc Sensor (DEXCOM G6 SENSOR) MISC Change every 10 days as directed for continuous glucose monitoring 10/19/22   Kelle Rosa MD   insulin glargine (LANTUS SOLOSTAR) 100 UNIT/ML injection pen Inject 50 Units into the skin 2 times daily  Patient taking differently: Inject 60 Units into the skin 2 times daily 10/19/22   Kelle Rosa MD   insulin lispro, 1 Unit Dial, (HUMALOG/ADMELOG) 100 UNIT/ML SOPN Inject 10 Units into the skin 3 times daily (with meals) 10/19/22   Kelle Rosa MD   Icosapent Ethyl (VASCEPA) 1 g CAPS capsule TAKE TWO CAPSULES BY MOUTH TWICE A DAY 10/18/22   Kelle Rosa MD   Insulin Pen Needle 29G X 12MM MISC Use to inject insulin 5 times daily 10/5/22   Kelle Rosa MD   [address] TEST KIT  9/20/22   Historical Provider, MD   levothyroxine (SYNTHROID) 88 MCG tablet TAKE 1 TABLET BY MOUTH ONE TIME A DAY 4/25/22   Kelle Rosa MD   atorvastatin (LIPITOR) 20 MG tablet TAKE 1 TABLET BY MOUTH ONE TIME A DAY 4/19/22   Kelle Rosa MD   gemfibrozil (LOPID) 600 MG tablet TAKE 1 TABLET BY MOUTH 2 TIMES A DAY 4/19/22   Kelle Rosa MD   metFORMIN (GLUCOPHAGE) 1000 MG tablet TAKE 1 TABLET BY MOUTH 2 TIMES A DAY WITH MEALS 4/19/22   Kelle Rosa MD   lisinopril-hydroCHLOROthiazide (PRINZIDE;ZESTORETIC) 10-12.5 MG per tablet TAKE 1 TABLET BY MOUTH ONE TIME A DAY 4/19/22   Kelle Rosa MD   spironolactone (ALDACTONE) 25 MG tablet TAKE 1 TABLET BY MOUTH ONE TIME A DAY 4/19/22   Kelle Rosa MD   Prodigy Lancets 28G MISC Use to test blood sugar up to 5 times daily 9/27/20   Nino Soto DO   Multiple Vitamins-Minerals (THERAPEUTIC MULTIVITAMIN-MINERALS) tablet Take 1 tablet by mouth daily    Historical Provider, MD   vitamin D (CHOLECALCIFEROL) 25 MCG (1000 UT) TABS tablet Take 1,000 Units by mouth daily    Historical Provider, MD   vitamin B-12 (CYANOCOBALAMIN) 500 MCG tablet Take 500 mcg by mouth daily    Historical Provider, MD       REVIEW OF SYSTEMS       Review of Systems  Syncope, abdominal pain, diarrhea, vomiting, nausea, dizziness  PHYSICAL EXAM      INITIAL VITALS:   /76   Pulse 87   Temp 98 °F (36.7 °C) (Oral)   Resp 22   SpO2 92%     Physical Exam  Belly soft nontender, AOx4, no focal neurologic deficits, heart sounds normal breath sounds clear minimal swelling bilateral lower extremities, obese habitus    DDX/DIAGNOSTIC RESULTS / EMERGENCY DEPARTMENT COURSE / MDM     Medical Decision Making  Amount and/or Complexity of Data Reviewed  Labs: ordered. Decision-making details documented in ED Course. Radiology: ordered. ECG/medicine tests: ordered. Risk  Prescription drug management. Decision regarding hospitalization. 71-year-old female coming in today for evaluation of syncope. She just had a crown placed on one of her teeth earlier in the day. She then came to work afterwards. She was going to see a new patient when she felt lightheaded going up the elevator. She went to sit down on the toilet she had a large bowel movement that she could not control and vomited. She was found passed out and a rapid response was called she was then brought on the emergency department. At this time she still has a queasy feeling in her abdomen. Her belly soft nontender on exam.  Her vitals are stable. Her breath sounds are clear. Orthostatic vitals however did indicate that she was hypotensive and she became symptomatic when she stood up. History of diabetes with insulin use. Blood glucose normal limits. Plan for CT of the head CT chest PE study, and CT abdomen. Concern for vasovagal syncope versus colitis versus AAA versus ACS. Imaging negative. Patient did have orthostatic vital changes. Will admit to medicine.       EMERGENCY DEPARTMENT COURSE:    ED Course as of 02/03/23 1532   Fri Feb 03, 2023   1244 WBC(!): 16.9 [TJ]   1504 CTH neg [EM]      ED Course User Index  [EM] Nikko Collins MD  [TJ] Yo Rosa MD       PROCEDURES:    Procedures    CONSULTS:  IP CONSULT TO INTERNAL MEDICINE        FINAL IMPRESSION      1. Syncope and collapse    2. Vasovagal syncope          DISPOSITION / PLAN     DISPOSITION Decision To Admit 02/03/2023 03:25:41 PM    PATIENT REFERRED TO:  No follow-up provider specified.     DISCHARGE MEDICATIONS:  New Prescriptions    No medications on file       Yo Rosa MD  Emergency Medicine Resident    (Please note that portions of thisnote were completed with a voice recognition program.  Efforts were made to edit the dictations but occasionally words are mis-transcribed.)       Yo Rosa MD  Resident  02/03/23 [address] Luiza Marin MD  Resident  02/03/23 [id number]

## 2023-02-03 NOTE — ED NOTES
Pt called out, states she has horrible headache, asking to have her BS checked. POC glucose 161. PT asking for update on ct results and plan of care. Resident notified.       Nikita Sterling RN  02/03/23 1686

## 2023-02-03 NOTE — ED NOTES
Bed change made, pt now inpatient, step down. Awaiting new bed placement.       Obinna Morales RN  02/03/23 7934

## 2023-02-03 NOTE — ED PROVIDER NOTES
[facility] [address] ED     Emergency Department     Faculty Attestation        I performed a history and physical examination of the patient and discussed management with the resident. I reviewed the residents note and agree with the documented findings and plan of care. Any areas of disagreement are noted on the chart. I was personally present for the key portions of any procedures. I have documented in the chart those procedures where I was not present during the key portions. I have reviewed the emergency nurses triage note. I agree with the chief complaint, past medical history, past surgical history, allergies, medications, social and family history as documented unless otherwise noted below. For Physician Assistant/ Nurse Practitioner cases/documentation I have personally evaluated this patient and have completed at least one if not all key elements of the E/M (history, physical exam, and MDM). Additional findings are as noted. Vital Signs: BP: 104/80  Heart Rate: 95  Resp: 14  Temp: 98 °F (36.7 °C) SpO2: 93 %  PCP:  Tina Ortiz MD    Pertinent Comments:     Patient is a 78-year-old female with history of hypothyroidism as well as diabetes. The patient was not feeling quite well you last night and did have episode of vomiting. This morning had a crown put on her tooth and a localized dental block done at that time and afterwards has been feeling nauseated and near syncopal.   When she came to work eventually had nausea and vomiting associate with some diarrhea and then a syncopal event for which she awoke on the floor with persistent nausea vomiting and diarrhea. There is some possible abdominal cramping but abdomen is soft/nontender at this time and specifically no right lower quadrant tenderness or McBurney's point or Woods sign. Heart is regular rate and rhythm to slightly tachycardic but no obvious systolic ejection murmur.    Lungs are clear to auscultation bilaterally but does have oxygen saturation between 88 and 93%. Denies shortness of breath or chest pain but overall \"just does not feel well\". Also denies headache or neck pain. Neurologically patient is intact with strength 5/5 in bilateral upper and lower extremities proximally distally as well as sensation light touch in all extremities. Cranial nerves II through XII intact except for slight facial droop on the left which was where the dental block occurred and states this was secondary to the dental block. No obvious nystagmus on exam and finger-nose is intact bilaterally. Pupils are 3 mm and briskly reactive to light bilaterally and equal.    Assessment/plan: Patient with likely viral etiology given amount of vomiting and diarrhea and likely vasovagal syncope however given borderline hypoxia will work-up cardiac potentiality's as well as PE/other thoracic etiology. CT brain as well. Attempt symptomatic control and reevaluate after    Critical Care  None      (Please note that portions of this note were completed with a voice recognition program. Efforts were made to edit the dictations but occasionally words are mis-transcribed.  Whenever words are used in this note in any gender, they shall be construed as though they were used in the gender appropriate to the circumstances; and whenever words are used in this note in the singular or plural form, they shall be construed as though they were used in the form appropriate to the circumstances.)    Narendra Corona MD Emmons Bancroft  Attending Emergency Medicine Physician            Theron Salas MD  02/03/23 9668

## 2023-02-03 NOTE — ED NOTES
Pt placed on cardiac monitor, BP cuff, and pulse ox. Alarms set.       Kerri Pleitez RN  02/03/23 1960

## 2023-02-03 NOTE — ED NOTES
Pt assisted to bedside commode. Pt remains alert, oriented, speaking in full, complete sentences, no distress noted. Friend at bedside. Pt updated on bed change, awaiting clean bed. Pt denies any needs other than asking for dinner tray.       Sabra Kim RN  02/03/23 8189

## 2023-02-03 NOTE — TELEPHONE ENCOUNTER
[facility] is calling to request a refill on the following medication(s):    Last Visit Date (If Applicable):  0/70/6045    Next Visit Date:    Visit date not found    Medication Request:  Requested Prescriptions     Pending Prescriptions Disp Refills    omeprazole (PRILOSEC) 20 MG delayed release capsule 90 capsule 1

## 2023-02-03 NOTE — ED NOTES
The following labs were labeled with appropriate pt sticker and tubed to lab:     [] Blue     [] Lavender   [] on ice  [] Green/yellow  [] Green/black [] on ice  [] Mann Crate  [] on ice  [] Yellow  [] Red  [] Type/ Screen  [] ABG  [] VBG    [x] COVID-19 swab    [x] Rapid  [] PCR  [x] Flu swab  [] Peds Viral Panel     [] Urine Sample  [] Fecal Sample  [] Pelvic Cultures  [] Blood Cultures  [] X 2  [] STREP Cultures     Kemal Gramajo RN  02/03/23 1133

## 2023-02-04 VITALS
DIASTOLIC BLOOD PRESSURE: 79 MMHG | HEART RATE: 81 BPM | SYSTOLIC BLOOD PRESSURE: 111 MMHG | WEIGHT: 233.03 LBS | HEIGHT: 69 IN | RESPIRATION RATE: 25 BRPM | TEMPERATURE: 97.9 F | OXYGEN SATURATION: 96 % | BODY MASS INDEX: 34.51 KG/M2

## 2023-02-04 PROBLEM — D72.829 LEUKOCYTOSIS: Status: ACTIVE | Noted: 2023-02-04

## 2023-02-04 PROBLEM — I95.1 ORTHOSTATIC HYPOTENSION: Status: ACTIVE | Noted: 2023-02-04

## 2023-02-04 PROBLEM — R55 SYNCOPE AND COLLAPSE: Status: ACTIVE | Noted: 2023-02-04

## 2023-02-04 PROBLEM — Z98.811: Status: ACTIVE | Noted: 2023-02-04

## 2023-02-04 PROBLEM — R55 MICTURITION SYNCOPE: Status: ACTIVE | Noted: 2023-02-04

## 2023-02-04 PROBLEM — E87.6 HYPOKALEMIA: Status: ACTIVE | Noted: 2023-02-04

## 2023-02-04 LAB
ABSOLUTE EOS #: 0.31 K/UL (ref 0–0.44)
ABSOLUTE IMMATURE GRANULOCYTE: 0.04 K/UL (ref 0–0.3)
ABSOLUTE LYMPH #: 2.04 K/UL (ref 1.1–3.7)
ABSOLUTE MONO #: 0.59 K/UL (ref 0.1–1.2)
ANION GAP SERPL CALCULATED.3IONS-SCNC: 9 MMOL/L (ref 9–17)
BASOPHILS # BLD: 0 % (ref 0–2)
BASOPHILS ABSOLUTE: 0.03 K/UL (ref 0–0.2)
BUN SERPL-MCNC: 10 MG/DL (ref 6–20)
CALCIUM SERPL-MCNC: 8.5 MG/DL (ref 8.6–10.4)
CHLORIDE SERPL-SCNC: 101 MMOL/L (ref 98–107)
CO2 SERPL-SCNC: 24 MMOL/L (ref 20–31)
CREAT SERPL-MCNC: 0.57 MG/DL (ref 0.5–0.9)
EOSINOPHILS RELATIVE PERCENT: 3 % (ref 1–4)
GFR SERPL CREATININE-BSD FRML MDRD: >60 ML/MIN/1.73M2
GLUCOSE BLD-MCNC: 166 MG/DL (ref 65–105)
GLUCOSE SERPL-MCNC: 193 MG/DL (ref 70–99)
HCT VFR BLD AUTO: 31.2 % (ref 36.3–47.1)
HGB BLD-MCNC: 10.7 G/DL (ref 11.9–15.1)
IMMATURE GRANULOCYTES: 0 %
LYMPHOCYTES # BLD: 21 % (ref 24–43)
MCH RBC QN AUTO: 28.5 PG (ref 25.2–33.5)
MCHC RBC AUTO-ENTMCNC: 34.3 G/DL (ref 28.4–34.8)
MCV RBC AUTO: 83 FL (ref 82.6–102.9)
MONOCYTES # BLD: 6 % (ref 3–12)
NRBC AUTOMATED: 0 PER 100 WBC
PDW BLD-RTO: 13.1 % (ref 11.8–14.4)
PLATELET # BLD AUTO: 316 K/UL (ref 138–453)
PMV BLD AUTO: 9.2 FL (ref 8.1–13.5)
POTASSIUM SERPL-SCNC: 3.6 MMOL/L (ref 3.7–5.3)
RBC # BLD: 3.76 M/UL (ref 3.95–5.11)
SEG NEUTROPHILS: 70 % (ref 36–65)
SEGMENTED NEUTROPHILS ABSOLUTE COUNT: 6.94 K/UL (ref 1.5–8.1)
SODIUM SERPL-SCNC: 134 MMOL/L (ref 135–144)
WBC # BLD AUTO: 10 K/UL (ref 3.5–11.3)

## 2023-02-04 PROCEDURE — 6370000000 HC RX 637 (ALT 250 FOR IP)

## 2023-02-04 PROCEDURE — 85025 COMPLETE CBC W/AUTO DIFF WBC: CPT

## 2023-02-04 PROCEDURE — 80048 BASIC METABOLIC PNL TOTAL CA: CPT

## 2023-02-04 PROCEDURE — 36415 COLL VENOUS BLD VENIPUNCTURE: CPT

## 2023-02-04 PROCEDURE — 82947 ASSAY GLUCOSE BLOOD QUANT: CPT

## 2023-02-04 PROCEDURE — 99223 1ST HOSP IP/OBS HIGH 75: CPT | Performed by: INTERNAL MEDICINE

## 2023-02-04 PROCEDURE — 2580000003 HC RX 258: Performed by: STUDENT IN AN ORGANIZED HEALTH CARE EDUCATION/TRAINING PROGRAM

## 2023-02-04 RX ORDER — SODIUM CHLORIDE 9 MG/ML
INJECTION, SOLUTION INTRAVENOUS CONTINUOUS
Status: DISCONTINUED | OUTPATIENT
Start: 2023-02-04 | End: 2023-02-04

## 2023-02-04 RX ADMIN — SODIUM CHLORIDE: 9 INJECTION, SOLUTION INTRAVENOUS at 09:40

## 2023-02-04 RX ADMIN — Medication 81 MG: at 08:41

## 2023-02-04 RX ADMIN — PANTOPRAZOLE SODIUM 40 MG: 40 TABLET, DELAYED RELEASE ORAL at 06:21

## 2023-02-04 RX ADMIN — ACETAMINOPHEN 650 MG: 325 TABLET ORAL at 03:52

## 2023-02-04 RX ADMIN — LEVOTHYROXINE SODIUM 88 MCG: 88 TABLET ORAL at 06:21

## 2023-02-04 NOTE — ED NOTES
Report given to MICHOACANO Leos.  All questions answered at this time     Tory Arnold RN  02/03/23 1924

## 2023-02-04 NOTE — ED NOTES
Patient presents to ED as rapid response from the floor. 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 Patient reports feeling okay prior to today. Patient had crown placed on her tooth earlier this morning and was feeling okay after.       Alan Joshua RN  02/03/23 Nery Hernández

## 2023-02-04 NOTE — PLAN OF CARE
Problem: Discharge Planning  Goal: Discharge to home or other facility with appropriate resources  2/4/2023 1131 by Luis Coronado RN  Outcome: Completed  2/4/2023 1131 by Luis Coronado RN  Outcome: Adequate for Discharge  2/4/2023 0210 by Luis Enrique Do RN  Outcome: Progressing     Problem: Safety - Adult  Goal: Free from fall injury  2/4/2023 1131 by Luis Coronado RN  Outcome: Completed  2/4/2023 1131 by Luis Coronado RN  Outcome: Adequate for Discharge  2/4/2023 0210 by Luis Enrique Do RN  Outcome: Progressing

## 2023-02-04 NOTE — CARE COORDINATION
Case Management Assessment  Initial Evaluation    Date/Time of Evaluation: 2/4/2023 10:25 AM  Assessment Completed by: Vangie Wilson RN    If patient is discharged prior to next notation, then this note serves as note for discharge by case management. Patient Name: Raynaldo Dancer                   YOB: 1970  Diagnosis: Syncope and collapse [R55]  Vasovagal syncope [R55]                   Date / Time: 2/3/2023 11:06 AM    Patient Admission Status: Inpatient   Readmission Risk (Low < 19, Mod (19-27), High > 27): Readmission Risk Score: 9.8    Current PCP: Hemalatha Smalls MD  PCP verified by CM? (P) Yes (Dr. Hemalatha Smalls)    Chart Reviewed: Yes      History Provided by: (P) Patient  Patient Orientation: (P) Alert and Oriented, Person, Place, Situation    Patient Cognition: (P) Alert    Hospitalization in the last 30 days (Readmission):  No    If yes, Readmission Assessment in  Navigator will be completed.     Advance Directives:      Code Status: Full Code   Patient's Primary Decision Maker is: (P) Legal Next of Kin      Discharge Planning:    Patient lives with: (P) Spouse/Significant Other, Children Type of Home: (P) House  Primary Care Giver: (P) Self  Patient Support Systems include: (P) Spouse/Significant Other, Children   Current Financial resources: (P) Other (Comment) (BCBS)  Current community resources:    Current services prior to admission: (P) Durable Medical Equipment            Current DME: (P) Glucometer            Type of Home Care services:  None    ADLS  Prior functional level: (P) Independent in ADLs/IADLs  Current functional level: (P) Independent in ADLs/IADLs    PT AM-PAC:   /24  OT AM-PAC:   /24    Family can provide assistance at DC: (P) Yes  Would you like Case Management to discuss the discharge plan with any other family members/significant others, and if so, who? (P) No  Plans to Return to Present Housing: (P) Yes  Other Identified Issues/Barriers to RETURNING to current housing: na  Potential Assistance needed at discharge: (P) N/A            Potential DME:    Patient expects to discharge to: (P) [facility] for transportation at discharge: (P) Family    Financial    Payor: Araceli Cooper / Plan: MARIEL DONAHUE [address] / Product Type: *No Product type* /     Does insurance require precert for SNF: Yes    Potential assistance Purchasing Medications:    Meds-to-Beds request:        [address], [address], [address]  [address] [facility], [address] [facility]  [address] [facility] [address]  Phone: [phone] Fax: [phone]    [facility] [address] [facility], [address]  [address] [facility] [address] [address]  Phone: [phone] Fax: [phone]    [address] [facility]  [address] [facility] [address] [facility] [phone] - f [phone]  [address]  [address] [facility] [address]  Phone: [phone] Fax: [phone]    [address] [facility] [address] [place], [address] [facility] [phone] [facility] [phone]  [address] [facility] [address] [facility] [address] [address]  Phone: [phone] Fax: [phone]      Notes:    Factors facilitating achievement of predicted outcomes: Family support, Motivated, Cooperative, Pleasant, and Has needed Durable Medical Equipment at home    Barriers to discharge: Decreased endurance    Additional Case Management Notes: Spoke with patient, role explained. Plan to return home with spouse, has transportation, denies further needs. Address, telephone numbers, PCP, insurance reviewed. The Plan for Transition of Care is related to the following treatment goals of Syncope and collapse [R55]  Vasovagal syncope [L58]    IF APPLICABLE: The Patient and/or patient representative Jolene Camargo and her family were provided with a choice of provider and agrees with the discharge plan.  Freedom of choice list with basic dialogue that supports the patient's individualized plan of care/goals and shares the quality data associated with the providers was provided to: (P) Patient   Patient Representative Name:       The Patient and/or Patient Representative Agree with the Discharge Plan?       Yaz Alex RN  Case Management Department  Ph: [phone]

## 2023-02-04 NOTE — DISCHARGE INSTRUCTIONS
Please take your medications as prescribed. Please make sure to adequately hydrate. Get an echocardiogram as an outpatient to evaluate your dizziness. We are holding your blood pressure medications secondary to your recent dizziness-please discuss with your Primary Care Provider re: resuming these in the next 7-10 days. You should follow-up with your Primary Care Provider in 7-10 days. Return to the Emergency Department and/or call 911 if recurrent dizziness, lightheadedness, and/or loss of consciousness.

## 2023-02-04 NOTE — PROGRESS NOTES
[facility]  Internal Medicine Teaching Residency Program  Inpatient Daily Progress Note  ______________________________________________________________________________    Patient: Surjit Ray  YOB: 1970   NXI:[id number]    Acct: [de-identified]     Room: [address] [place] [address]  Admit date: 2/3/2023  Today's date: 02/04/23  Number of days in the hospital: 1    SUBJECTIVE   Admitting Diagnosis: Vasovagal syncope  CC: Presyncope  Pt examined at bedside. Chart & results reviewed. Patient is hemodynamically stable and afebrile  No acute events overnight    ROS:  Constitutional:  negative for chills, fevers, sweats  Respiratory:  negative for cough, dyspnea on exertion, hemoptysis, shortness of breath, wheezing  Cardiovascular:  negative for chest pain, chest pressure/discomfort, lower extremity edema, palpitations  Gastrointestinal:  negative for abdominal pain, constipation, diarrhea, nausea, vomiting  Neurological:  negative for dizziness, headache  BRIEF HISTORY     The patient is a pleasant 47 y.o. female with a past medical history significant for diabetes mellitus on insulin , GERD and hypothyroidism presents with a chief complaint of dizziness and diarrhea. Patient reported that she was in the bathroom and when she tried to sit up she felt dizzy and almost blacked out but he did not fell down. Patient also reported loose diarrhea. She denies loss of consciousness, palpitation, or chest pain. Patient reported that she had a dental crown put early morning the day of admission and when she went to her work he was somehow hungry. Patient denied any history of smoking, alcohol or illicit drugs use. In the ED glucose 163, potassium 3.3 replaced. WBC 16.9. Flu and COVID were negative. CT head CT chest were negative.   Orthostatic was positive    OBJECTIVE     Vital Signs:  BP 94/70   Pulse 84   Temp 98.3 °F (36.8 °C) (Oral)   Resp 17   Ht 5' 9\" (1.753 m) Wt 233 lb 0.4 oz (105.7 kg)   SpO2 94%   BMI 34.41 kg/m²     Temp (24hrs), Av.3 °F (36.8 °C), Min:98 °F (36.7 °C), Max:98.7 °F (37.1 °C)    In:    Out: 200 [Urine:200]    Body Mass Index : There is no height or weight on file to calculate BMI. PHYSICAL EXAMINATION:  Constitutional: This is a well developed, well nourished, 47y.o. year old female who is alert, oriented, cooperative and in no apparent distress. Head:normocephalic and atraumatic. EENT:  PERRLA. No conjunctival injections. Septum was midline, mucosa was without erythema, exudates or cobblestoning. No thrush was noted. Neck: Supple without thyromegaly. No elevated JVP. Trachea was midline. Respiratory: Chest was symmetrical without dullness to percussion. Breath sounds bilaterally were clear to auscultation. There were no wheezes, rhonchi or rales. There is no intercostal retraction or use of accessory muscles. No egophony noted. Cardiovascular: Regular without murmur, clicks, gallops or rubs. Abdomen: Slightly rounded and soft without organomegaly. No rebound, rigidity or guarding was appreciated. Lymphatic: No lymphadenopathy. Musculoskeletal: Normal curvature of the spine. No gross muscle weakness. Extremities:  No lower extremity edema, ulcerations, tenderness, varicosities or erythema. Muscle size, tone and strength are normal.  No involuntary movements are noted. Skin:  Warm and dry. Good color, turgor and pigmentation. No lesions or scars.   No cyanosis or clubbing  Neurological/Psychiatric: The patient's general behavior, level of consciousness, thought content and emotional status is normal.         Medications:  Scheduled Medications:    sodium chloride flush  5-40 mL IntraVENous 2 times per day    enoxaparin  40 mg SubCUTAneous Daily    aspirin  81 mg Oral Daily    atorvastatin  20 mg Oral Daily    levothyroxine  88 mcg Oral Daily    [Held by provider] lisinopril-hydroCHLOROthiazide  1 tablet Oral Daily    pantoprazole  40 mg Oral QAM AC    [Held by provider] spironolactone  25 mg Oral Daily    insulin lispro  0-8 Units SubCUTAneous TID WC    insulin lispro  0-4 Units SubCUTAneous Nightly     Continuous Infusions:    sodium chloride      dextrose      lactated ringers IV soln 50 mL/hr at 02/03/23 2340     PRN Medicationssodium chloride flush, 5-40 mL, PRN  sodium chloride, , PRN  ondansetron, 4 mg, Q8H PRN   Or  ondansetron, 4 mg, Q6H PRN  polyethylene glycol, 17 g, Daily PRN  acetaminophen, 650 mg, Q6H PRN   Or  acetaminophen, 650 mg, Q6H PRN  glucose, 4 tablet, PRN  dextrose bolus, 125 mL, PRN   Or  dextrose bolus, 250 mL, PRN  glucagon (rDNA), 1 mg, PRN  dextrose, , Continuous PRN        Diagnostic Labs:  CBC:   Recent Labs     02/03/23  1156   WBC 16.9*   RBC 4.67   HGB 13.1   HCT 39.4   MCV 84.4   RDW 13.0        BMP:   Recent Labs     02/03/23  1156      K 3.3*      CO2 19*   BUN 14   CREATININE 0.66     BNP: No results for input(s): BNP in the last 72 hours. PT/INR: No results for input(s): PROTIME, INR in the last 72 hours. APTT: No results for input(s): APTT in the last 72 hours. CARDIAC ENZYMES: No results for input(s): CKMB, CKMBINDEX, TROPONINI in the last 72 hours. Invalid input(s): CKTOTAL;3  FASTING LIPID PANEL:  Lab Results   Component Value Date    CHOL 119 09/06/2022    HDL 26 (L) 09/06/2022    TRIG 267 (H) 09/06/2022     LIVER PROFILE:   Recent Labs     02/03/23  1156   AST 22   ALT 25   BILITOT 0.3   ALKPHOS 80      MICROBIOLOGY: No results found for: CULTURE    Imaging:    XR CHEST (2 VW)    Result Date: 2/3/2023  Unchanged appearance of the chest without acute airspace disease identified. CT HEAD WO CONTRAST    Result Date: 2/3/2023  No acute intracranial abnormality. CT ABDOMEN PELVIS W IV CONTRAST Additional Contrast? None    Result Date: 2/3/2023  1. No evidence of pulmonary embolism or acute pulmonary abnormality.  2.  No acute inflammatory process identified in the abdomen or pelvis. Punctate gallstones. 3.  Hepatosplenomegaly. RECOMMENDATIONS: Unavailable     CT CHEST PULMONARY EMBOLISM W CONTRAST    Result Date: 2/3/2023  1. No evidence of pulmonary embolism or acute pulmonary abnormality. 2.  No acute inflammatory process identified in the abdomen or pelvis. Punctate gallstones. 3.  Hepatosplenomegaly. RECOMMENDATIONS: Unavailable       ASSESSMENT & PLAN     ASSESSMENT / PLAN:      IMPRESSION  This is a 47 y.o. female who presented with Dizziness and diarrhea and found to have Orthostatic hypotension. Principal Problem:  Orthostatic hypotension  -Started on IV fluids LR 50 mL/h  -Replace electrolytes as needed     Diabetes mellitus  -Use Lantus at home  -Started on medium dose sliding scale  -Hypoglycemia protocol initiated     Hypothyroidism  -Resume levothyroxine 88 mg     Hyperlipidemia  - Lipitor 20 mg. GERD  -Home omeprazole     DVT ppx: Lovenox  GI ppx: Resumed omeprazole     PT/OT: Consulted  Discharge Planning:  consulted    Tay Faulkner MD  Internal Medicine Resident, PGY-1  [address], [place]  2/4/2023, 2:44 AM

## 2023-02-04 NOTE — PLAN OF CARE
Problem: Discharge Planning  Goal: Discharge to home or other facility with appropriate resources  2/4/2023 1131 by Giselle Rea RN  Outcome: Adequate for Discharge  2/4/2023 0210 by Erin Rivera RN  Outcome: Progressing     Problem: Safety - Adult  Goal: Free from fall injury  2/4/2023 1131 by Giselle Rea RN  Outcome: Adequate for Discharge  2/4/2023 0210 by Erin Rivera RN  Outcome: Progressing

## 2023-02-05 LAB
EKG ATRIAL RATE: 98 BPM
EKG P AXIS: 16 DEGREES
EKG P-R INTERVAL: 170 MS
EKG Q-T INTERVAL: 370 MS
EKG QRS DURATION: 110 MS
EKG QTC CALCULATION (BAZETT): 472 MS
EKG R AXIS: -42 DEGREES
EKG T AXIS: 30 DEGREES
EKG VENTRICULAR RATE: 98 BPM

## 2023-02-05 PROCEDURE — 93010 ELECTROCARDIOGRAM REPORT: CPT | Performed by: INTERNAL MEDICINE

## 2023-02-06 ENCOUNTER — OFFICE VISIT (OUTPATIENT)
Dept: FAMILY MEDICINE CLINIC | Age: 53
End: 2023-02-06

## 2023-02-06 ENCOUNTER — CARE COORDINATION (OUTPATIENT)
Dept: OTHER | Facility: CLINIC | Age: 53
End: 2023-02-06

## 2023-02-06 VITALS
OXYGEN SATURATION: 99 % | SYSTOLIC BLOOD PRESSURE: 99 MMHG | HEART RATE: 72 BPM | TEMPERATURE: 97.2 F | DIASTOLIC BLOOD PRESSURE: 64 MMHG | BODY MASS INDEX: 33.7 KG/M2 | WEIGHT: 228.2 LBS

## 2023-02-06 DIAGNOSIS — R55 VASOVAGAL SYNCOPE: ICD-10-CM

## 2023-02-06 DIAGNOSIS — I95.9 HYPOTENSION, UNSPECIFIED HYPOTENSION TYPE: ICD-10-CM

## 2023-02-06 DIAGNOSIS — Z09 HOSPITAL DISCHARGE FOLLOW-UP: Primary | ICD-10-CM

## 2023-02-06 SDOH — ECONOMIC STABILITY: INCOME INSECURITY: HOW HARD IS IT FOR YOU TO PAY FOR THE VERY BASICS LIKE FOOD, HOUSING, MEDICAL CARE, AND HEATING?: NOT HARD AT ALL

## 2023-02-06 SDOH — ECONOMIC STABILITY: HOUSING INSECURITY
IN THE LAST 12 MONTHS, WAS THERE A TIME WHEN YOU DID NOT HAVE A STEADY PLACE TO SLEEP OR SLEPT IN A SHELTER (INCLUDING NOW)?: NO

## 2023-02-06 SDOH — ECONOMIC STABILITY: FOOD INSECURITY: WITHIN THE PAST 12 MONTHS, YOU WORRIED THAT YOUR FOOD WOULD RUN OUT BEFORE YOU GOT MONEY TO BUY MORE.: NEVER TRUE

## 2023-02-06 SDOH — ECONOMIC STABILITY: FOOD INSECURITY: WITHIN THE PAST 12 MONTHS, THE FOOD YOU BOUGHT JUST DIDN'T LAST AND YOU DIDN'T HAVE MONEY TO GET MORE.: NEVER TRUE

## 2023-02-06 ASSESSMENT — PATIENT HEALTH QUESTIONNAIRE - PHQ9
SUM OF ALL RESPONSES TO PHQ9 QUESTIONS 1 & 2: 0
1. LITTLE INTEREST OR PLEASURE IN DOING THINGS: 0
SUM OF ALL RESPONSES TO PHQ QUESTIONS 1-9: 0
SUM OF ALL RESPONSES TO PHQ QUESTIONS 1-9: 0
2. FEELING DOWN, DEPRESSED OR HOPELESS: 0
SUM OF ALL RESPONSES TO PHQ QUESTIONS 1-9: 0
SUM OF ALL RESPONSES TO PHQ QUESTIONS 1-9: 0

## 2023-02-06 NOTE — DISCHARGE SUMMARY
[address]     Department of Internal Medicine - Staff Internal Medicine Teaching Service    INPATIENT DISCHARGE SUMMARY      Patient Identification:  Yassine Delgado is a 47 y.o. female. :  1970  MRN: [id number]     Acct: [de-identified]   PCP: Fabienne Leyden, MD  Admit Date:  2/3/2023  Discharge date and time: 2023 12:04 PM   Attending Provider: No att. providers found                                     [facility]  Principal Problem:    Vasovagal syncope  Active Problems:    Micturition syncope    S/p dental crown    Orthostatic hypotension    Hypokalemia    Leukocytosis    Syncope and collapse    Hypothyroidism    Diabetes ([address]  Resolved Problems:    * No resolved hospital problems. *      HOSPITAL STAY     Brief Inpatient course: The patient is a pleasant 47 y.o. female with a past medical history significant for diabetes mellitus on insulin , GERD and hypothyroidism presents with a chief complaint of dizziness and diarrhea. Patient reported that she was in the bathroom and when she tried to sit up she felt dizzy and almost blacked out but he did not fell down. Patient also reported loose diarrhea. She denies loss of consciousness, palpitation, or chest pain. Patient reported that she had a dental crown put early morning the day of admission and when she went to her work he was somehow hungry. Patient denied any history of smoking, alcohol or illicit drugs use. In the ED glucose 163, potassium 3.3 replaced. WBC 16.9. Flu and COVID were negative. CT head CT chest were negative. Orthostatic was positive and patient was managed with IV fluid and discharged stable.         Procedures/ Significant Interventions:    None    Consults:     Consults:     Final Specialist Recommendations/Findings:   IP CONSULT TO INTERNAL MEDICINE  IP CONSULT TO CASE MANAGEMENT      Any Hospital Acquired Infections: none    Discharge Functional Status:  stable    DISCHARGE PLAN     Disposition: home    Patient Instructions:   Discharge Medication List as of 2/4/2023 11:33 AM        CONTINUE these medications which have NOT CHANGED    Details   aspirin (ASPIRIN LOW DOSE) 81 MG EC tablet Take 1 tablet by mouth daily, Disp-90 tablet, R-1Normal      omeprazole (PRILOSEC) 20 MG delayed release capsule TAKE 1 CAPSULE BY MOUTH ONE TIME A DAY, Disp-90 capsule, R-1Normal      Semaglutide, 1 MG/DOSE, (OZEMPIC, 1 MG/DOSE,) 4 MG/3ML SOPN Inject 1mg once weekly as directed., Disp-9 mL, R-1Normal      Continuous Blood Gluc Transmit (DEXCOM G6 TRANSMITTER) MISC Change every 90 days for continuous glucose monitoring, Disp-1 each, R-3Normal      !! Continuous Blood Gluc Sensor (DEXCOM G6 SENSOR) MISC CHANGE EVERY 10 DAYS AS DIRECTED FOR CONTINUOUS GLUCOSE MONITORING, Disp-9 each, R-3Normal      !! Continuous Blood Gluc Sensor (DEXCOM G6 SENSOR) MISC Change every 10 days as directed for continuous glucose monitoring, Disp-9 each, R-3Patient DOES NOT need  as she will be using her phone. Normal      insulin glargine (LANTUS SOLOSTAR) 100 UNIT/ML injection pen Inject 50 Units into the skin 2 times daily, Disp-15 Adjustable Dose Pre-filled Pen Syringe, R-1Normal      insulin lispro, 1 Unit Dial, (HUMALOG/ADMELOG) 100 UNIT/ML SOPN Inject 10 Units into the skin 3 times daily (with meals), Disp-15 Adjustable Dose Pre-filled Pen Syringe, R-1Normal      Icosapent Ethyl (VASCEPA) 1 g CAPS capsule TAKE TWO CAPSULES BY MOUTH TWICE A DAY, Disp-360 capsule, R-1Normal      Insulin Pen Needle 29G X 12MM MISC Disp-200 each, R-3, NormalUse to inject insulin 5 times daily      FLOWFLEX COVID-19 AG HOME TEST KIT DAWHistorical Med      levothyroxine (SYNTHROID) 88 MCG tablet TAKE 1 TABLET BY MOUTH ONE TIME A DAY, Disp-90 tablet, R-5Normal      atorvastatin (LIPITOR) 20 MG tablet TAKE 1 TABLET BY MOUTH ONE TIME A DAY, Disp-90 tablet, R-5Normal      gemfibrozil (LOPID) 600 MG tablet TAKE 1 TABLET BY MOUTH 2 TIMES A DAY, Disp-180 tablet, R-5Normal      metFORMIN (GLUCOPHAGE) 1000 MG tablet TAKE 1 TABLET BY MOUTH 2 TIMES A DAY WITH MEALS, Disp-180 tablet, R-5Normal      Prodigy Lancets 28G MISC Disp-500 each,R-3, NormalUse to test blood sugar up to 5 times daily      Multiple Vitamins-Minerals (THERAPEUTIC MULTIVITAMIN-MINERALS) tablet Take 1 tablet by mouth dailyHistorical Med      vitamin D (CHOLECALCIFEROL) 25 MCG (1000 UT) TABS tablet Take 1,000 Units by mouth dailyHistorical Med      vitamin B-12 (CYANOCOBALAMIN) 500 MCG tablet Take 500 mcg by mouth dailyHistorical Med       !! - Potential duplicate medications found. Please discuss with provider. STOP taking these medications       lisinopril-hydroCHLOROthiazide (PRINZIDE;ZESTORETIC) 10-12.5 MG per tablet Comments:   Reason for Stopping:         spironolactone (ALDACTONE) 25 MG tablet Comments:   Reason for Stopping:               Activity: activity as tolerated    Diet: diabetic diet    Follow-up:    Osiel Valenzuela MD  [address] [place] [id number] [phone]    Follow up in 1 week(s)  hospital discharge follow-up: dizziness-should get TTE as OP. DM      Patient Instructions:   Please take your medications as prescribed. Please make sure to adequately hydrate. Get an echocardiogram as an outpatient to evaluate your dizziness. We are holding your blood pressure medications secondary to your recent dizziness-please discuss with your Primary Care Provider re: resuming these in the next 7-10 days. You should follow-up with your Primary Care Provider in 7-10 days. Return to the Emergency Department and/or call 911 if recurrent dizziness, lightheadedness, and/or loss of consciousness.    Follow up labs: None  Follow up imaging: None    Note that over 30 minutes was spent in preparing discharge papers, discussing discharge with patient, medication review, etc.      Milena Raymundo MD, MD  Internal Medicine Resident, PGY-1  [address] [facility];  [place], [place]  2/6/2023, 2:26 PM

## 2023-02-06 NOTE — PROGRESS NOTES
Post-Discharge Transitional Care  Follow Up      Manjula Delgado   YOB: 1970    Date of Office Visit:  2/6/2023  Date of Hospital Admission: 2/3/23  Date of Hospital Discharge: 2/4/23  Risk of hospital readmission (high >=14%. Medium >=10%) :Readmission Risk Score: 9.8      Care management risk score Rising risk (score 2-5) and Complex Care (Scores >=6): No Risk Score On File     Non face to face  following discharge, date last encounter closed (first attempt may have been earlier): *No documented post hospital discharge outreach found in the last 14 days    Call initiated 2 business days of discharge: *No response recorded in the last 14 days    ASSESSMENT/PLAN:   Hospital discharge follow-up  -     [facility] DISCHARGE MEDS RECONCILED W/ CURRENT OUTPATIENT MED LIST  Vasovagal syncope  -     [facility] - Annamae Peabody, MD, Cardiology, [facility]  Hypotension, unspecified hypotension type    Patient will continue her insulin as she has been taking before. She needs to see cardiologist as her blood pressure has not come up as before. She really does not have any other concerns besides the weakness this squeezing feeling. Call or return to clinic prn if these symptoms worsen or fail to improve as anticipated. I have reviewed the instructions with the patient, answering all questions to her satisfaction. Medical Decision Making: moderate complexity  Return in about 6 months (around 8/6/2023), or if symptoms worsen or fail to improve. On this date 2/6/2023 I have spent 20 minutes reviewing previous notes, test results and face to face with the patient discussing the diagnosis and importance of compliance with the treatment plan as well as documenting on the day of the visit. Subjective:   HPI:  Follow up of Hospital problems/diagnosis(es): 76-year-old female coming in today after she was admitted to the hospital for a syncope collapse/vasovagal syncope.   After she was at the dentist she went to the hospital where she did have a a syncope. She did take her insulin in AM and then did not eat a lot. When she wanted to see her pt she almost passed out, when sitting on the toilet she passed out. BP was very low, pulse very low. Still has been feeling weak and N and squeezing. She stopped all her blood pressure medication or all the medication which could affect her blood pressure. She still feels again weak not able to drive not able to work. Her sister brought her today to her appointment. [facility] 1 week 02/06/2023 - 02/13/2023    Inpatient course: Discharge summary reviewed- see chart. Interval history/Current status: Good    Patient Active Problem List   Diagnosis    Anxiety    Atopic dermatitis    Atopic rhinitis    Benign essential hypertension    Complicated varicose veins    Gastroesophageal reflux disease    Hyperlipidemia    Hypothyroidism    Low HDL (under 40)    Mass of left thigh    Mild intermittent asthma with acute exacerbation    Obesity (BMI 30-39. 9)    Osteochondroma of left fibula    Diabetes ([address]    Vitamin D deficiency    Vasovagal syncope    Micturition syncope    S/p dental crown    Orthostatic hypotension    Hypokalemia    Leukocytosis    Syncope and collapse       Medications listed as ordered at the time of discharge from hospital     Medication List            Accurate as of February 6, 2023 11:21 AM. If you have any questions, ask your nurse or doctor.                 CHANGE how you take these medications      Lantus SoloStar 100 UNIT/ML injection pen  Generic drug: insulin glargine  Inject 50 Units into the skin 2 times daily  What changed: how much to take            CONTINUE taking these medications      aspirin 81 MG EC tablet  Commonly known as: Aspirin Low Dose  Take 1 tablet by mouth daily     atorvastatin 20 MG tablet  Commonly known as: LIPITOR  TAKE 1 TABLET BY MOUTH ONE TIME A DAY     * Dexcom G6 Sensor Misc  CHANGE EVERY 10 DAYS AS DIRECTED FOR CONTINUOUS GLUCOSE MONITORING     * Dexcom G6 Sensor Misc  Change every 10 days as directed for continuous glucose monitoring     Dexcom G6 Transmitter Misc  Change every 90 days for continuous glucose monitoring     Flowflex COVID-19 Ag Home Test Kit  Generic drug: COVID-19 At Home Antigen Test     gemfibrozil 600 MG tablet  Commonly known as: LOPID  TAKE 1 TABLET BY MOUTH 2 TIMES A DAY     Icosapent Ethyl 1 g Caps capsule  Commonly known as: Vascepa  TAKE TWO CAPSULES BY MOUTH TWICE A DAY     insulin lispro (1 Unit Dial) 100 UNIT/ML Sopn  Commonly known as: HUMALOG/ADMELOG  Inject 10 Units into the skin 3 times daily (with meals)     Insulin Pen Needle 29G X 12MM Misc  Use to inject insulin 5 times daily     levothyroxine 88 MCG tablet  Commonly known as: SYNTHROID  TAKE 1 TABLET BY MOUTH ONE TIME A DAY     metFORMIN 1000 MG tablet  Commonly known as: GLUCOPHAGE  TAKE 1 TABLET BY MOUTH 2 TIMES A DAY WITH MEALS     omeprazole 20 MG delayed release capsule  Commonly known as: PRILOSEC  TAKE 1 CAPSULE BY MOUTH ONE TIME A DAY     Ozempic (1 MG/DOSE) 4 MG/3ML Sopn  Generic drug: Semaglutide (1 MG/DOSE)  Inject 1mg once weekly as directed. Prodigy Lancets 28G Misc  Use to test blood sugar up to 5 times daily     therapeutic multivitamin-minerals tablet     vitamin B-12 500 MCG tablet  Commonly known as: CYANOCOBALAMIN     vitamin D 25 MCG (1000 UT) Tabs tablet  Commonly known as: CHOLECALCIFEROL           * This list has 2 medication(s) that are the same as other medications prescribed for you. Read the directions carefully, and ask your doctor or other care provider to review them with you.                     Medications marked \"taking\" at this time  Outpatient Medications Marked as Taking for the 2/6/23 encounter (Office Visit) with Diamond Duval MD   Medication Sig Dispense Refill    aspirin (ASPIRIN LOW DOSE) 81 MG EC tablet Take 1 tablet by mouth daily 90 tablet 1    omeprazole (PRILOSEC) 20 MG delayed release capsule TAKE 1 CAPSULE BY MOUTH ONE TIME A DAY 90 capsule 1    Semaglutide, 1 MG/DOSE, (OZEMPIC, 1 MG/DOSE,) 4 MG/3ML SOPN Inject 1mg once weekly as directed.  9 mL 1    Continuous Blood Gluc Transmit (DEXCOM G6 TRANSMITTER) MISC Change every 90 days for continuous glucose monitoring 1 each 3    Continuous Blood Gluc Sensor (DEXCOM G6 SENSOR) MISC CHANGE EVERY 10 DAYS AS DIRECTED FOR CONTINUOUS GLUCOSE MONITORING 9 each 3    Continuous Blood Gluc Sensor (DEXCOM G6 SENSOR) MISC Change every 10 days as directed for continuous glucose monitoring 9 each 3    insulin glargine (LANTUS SOLOSTAR) 100 UNIT/ML injection pen Inject 50 Units into the skin 2 times daily (Patient taking differently: Inject 60 Units into the skin 2 times daily) 15 Adjustable Dose Pre-filled Pen Syringe 1    insulin lispro, 1 Unit Dial, (HUMALOG/ADMELOG) 100 UNIT/ML SOPN Inject 10 Units into the skin 3 times daily (with meals) 15 Adjustable Dose Pre-filled Pen Syringe 1    Icosapent Ethyl (VASCEPA) 1 g CAPS capsule TAKE TWO CAPSULES BY MOUTH TWICE A  capsule 1    Insulin Pen Needle 29G X 12MM MISC Use to inject insulin 5 times daily 200 each 3    FLOWFLEX COVID-19 AG HOME TEST KIT       levothyroxine (SYNTHROID) 88 MCG tablet TAKE 1 TABLET BY MOUTH ONE TIME A DAY 90 tablet 5    atorvastatin (LIPITOR) 20 MG tablet TAKE 1 TABLET BY MOUTH ONE TIME A DAY 90 tablet 5    gemfibrozil (LOPID) 600 MG tablet TAKE 1 TABLET BY MOUTH 2 TIMES A  tablet 5    metFORMIN (GLUCOPHAGE) 1000 MG tablet TAKE 1 TABLET BY MOUTH 2 TIMES A DAY WITH MEALS 180 tablet 5    Prodigy Lancets 28G MISC Use to test blood sugar up to 5 times daily 500 each 3    Multiple Vitamins-Minerals (THERAPEUTIC MULTIVITAMIN-MINERALS) tablet Take 1 tablet by mouth daily      vitamin D (CHOLECALCIFEROL) 25 MCG (1000 UT) TABS tablet Take 1,000 Units by mouth daily      vitamin B-12 (CYANOCOBALAMIN) 500 MCG tablet Take 500 mcg by mouth daily          Medications patient taking as of now reconciled against medications ordered at time of hospital discharge: Yes    A comprehensive review of systems was negative except for what was noted in the HPI. Objective:    BP 99/64   Pulse 72   Temp 97.2 °F (36.2 °C)   Wt 228 lb 3.2 oz (103.5 kg)   SpO2 99%   BMI 33.70 kg/m²   General Appearance: alert and oriented to person, place and time, well-developed and well-nourished, in no acute distress  Pulmonary/Chest: clear to auscultation bilaterally- no wheezes, rales or rhonchi, normal air movement, no respiratory distress  Cardiovascular: normal rate, normal S1 and S2, and no gallops      An electronic signature was used to authenticate this note.   --Bijal Guerrero MD    (Please note that portions of this note were completed with a voice recognition program. Efforts were made to edit the dictations but occasionally words are mis-transcribed.)

## 2023-02-06 NOTE — CARE COORDINATION
[facility] Care Transitions Initial Follow Up Call    Call within 2 business days of discharge: Yes    Care Transition Nurse contacted the patient by telephone to perform post hospital discharge assessment. Verified name and  with patient as identifiers. Provided introduction to self, and explanation of the Care Transition Nurse role. Patient: Manjula Delgado Patient : 1970   MRN: [id number]  Reason for Admission: Vasovagal syncope  Discharge Date: 23 RARS: Readmission Risk Score: 9.8      Last Discharge  [address]       Date Complaint Diagnosis Description Type Department Provider    2/3/23 Loss of Consciousness; Emesis; Dizziness Syncope and collapse . .. ED to Hosp-Admission (Discharged) (ADMITTED) [facility] Ayah Nielson MD; Juli Messina . .. Was this an external facility discharge? No Discharge Facility: [address] [place] [address] to be reviewed by the provider   Additional needs identified to be addressed with provider: No  none               Method of communication with provider: chart routing- to notify PCP of new CT episode    Called patient for initial CT follow up. She states still \"weak and foggy\" since home. PCP follow up was today- referral sent to cardiology Dr. Fabiola Magdaleno. She has initial cardiology OV 23. States PCP said to wait on IRINA until discussing with cardiology. Also to discuss return to work with cardiology. Per PCP OV note today, patient to remain off work until 23. ACM advised patient to call Vijaya Whipple (associate services phone number provided) to notify of AYALA. Verbalized understanding. Patient states appetite decreased from baseline- although still eating. Drinking ~80 oz water daily. Denies nausea or vomiting. Denies issues with urination or bowels. She verified has been holding Lisinopril-HCTZ and Spironolactone due to low BP. States BP at PCP OV today 99/56. Denies dizziness. States BS increased since being sick- now in the low 200s.  She reports was not getting any insulin during recent hospitalization. States BS was \"controlled\" prior to IP stay. Uses Dexcom G6. States takes Humalog when eating high carb meals. Denies immediate concerns or needs for ACM. Agreeable to follow up calls. Care Transition Nurse reviewed discharge instructions, medical action plan, and red flags with patient who verbalized understanding. The patient was given an opportunity to ask questions and does not have any further questions or concerns at this time. Were discharge instructions available to patient? Yes. Reviewed appropriate site of care based on symptoms and resources available to patient including: PCP  Benefits related nurse triage line. The patient agrees to contact the PCP office for questions related to their healthcare. Advance Care Planning:   Does patient have an Advance Directive: decision maker updated. Medication reconciliation was performed with patient, who verbalizes understanding of administration of home medications.      Was patient discharged with a pulse oximeter? no    Non-face-to-face services provided:  Obtained and reviewed discharge summary and/or continuity of care documents  Assessment and support for treatment adherence and medication management-complete medication review performed with patient      Care Transitions 24 Hour Call    Do you have a copy of your discharge instructions?: Yes  Do you have all of your prescriptions and are they filled?: Yes  Have you scheduled your follow up appointment?: Yes  How are you going to get to your appointment?: Car - family or friend to transport  Do you have support at home?: Partner/Spouse/SO  Do you feel like you have everything you need to keep you well at home?: Yes  Care Transitions Interventions         Follow Up  Future Appointments   Date Time Provider Beck Campos   10/6/2023  7:30 AM DEVI Saha - CNP [facility] Isidro Chau MD Cardiology  [phone]  [address] [place] [address]  Initial OV on 2/8/2023      Care Transition Nurse provided contact information. Plan for follow-up call in 5-7 days based on severity of symptoms and risk factors. Plan for next call:  discuss POC after cardiology OV, monitor BP    Oneil Mancuso MSN RN  Associate Care Manager  Phone: [phone]  Email: [email or web address]

## 2023-02-08 ENCOUNTER — TELEPHONE (OUTPATIENT)
Dept: FAMILY MEDICINE CLINIC | Age: 53
End: 2023-02-08

## 2023-02-08 NOTE — PROGRESS NOTES
Physician Progress Note      Elma Gonzalez  [facility] #:                  [id number]  :                       1970  ADMIT DATE:       2/3/2023 11:06 AM  DISCH DATE:        2023 12:04 PM  RESPONDING  PROVIDER #:        KIERRA Guillen          QUERY TEXT:    Patient admitted with syncope with orthostatic hypotension. Patient noted to   have diarrhea, prior NPO status for dental procedure and took antihypertensive   medication that morning. If possible, please document in progress notes and discharge summary after   study the etiology of the syncope with orthostatic hypotension: The medical record reflects the following:  Risk Factors: HX DM2, HTN  Clinical Indicators: NPO status for dental procedure, Diarrhea and emesis,   orthostatics: 102/62 lying>78/62 standing HR 95  Treatment: Home medications including HCTZ, Aldactone DC 'd on discharge. IVF,   lab monitoring, follow up OP    Thank you please reach out for any questions! Lelo Arriaga RN CCDS, CDI Supervisor [phone]  Options provided:  -- Syncope due to orthostatic hypotension secondary to dehydration  -- Syncope due to orthostatic hypotension secondary to dehydration and effects   of antihypertensive medications  -- Syncope due to orthostatic hypotension unrelated to dehydration and effects   of antihypertensive medications  -- Other - I will add my own diagnosis  -- Disagree - Not applicable / Not valid  -- Disagree - Clinically unable to determine / Unknown  -- Refer to Clinical Documentation Reviewer    PROVIDER RESPONSE TEXT:    The syncope is due to orthostatic hypotension secondary to dehydration. Query created by: Christal Molina on 2023 11:54 AM      Electronically signed by:   KIERRA Guillen 2023 5:48 PM

## 2023-02-08 NOTE — TELEPHONE ENCOUNTER
Patient called in you took her off of work until she saw her cardiologist and she saw him today and she would need a release to go back to work for Monday.     She would like to  the letter tomorrow it has to be to her employer by Friday         Please advise

## 2023-02-13 ENCOUNTER — CARE COORDINATION (OUTPATIENT)
Dept: OTHER | Facility: CLINIC | Age: 53
End: 2023-02-13

## 2023-02-14 ENCOUNTER — CARE COORDINATION (OUTPATIENT)
Dept: OTHER | Facility: CLINIC | Age: 53
End: 2023-02-14

## 2023-02-14 NOTE — CARE COORDINATION
[facility] Care Transitions Follow Up Call    Patient Current Location: [address]  Transition Nurse contacted the patient by telephone to follow up after admission on 2/3/2023. Verified name and  with patient as identifiers. Patient: Rosa Villarreal  Patient : 1970   MRN: [id number]  Reason for Admission: Vasovagal syncope  Discharge Date: 23 RARS: Readmission Risk Score: 9.8      Needs to be reviewed by the provider   Additional needs identified to be addressed with provider: No  none             Method of communication with provider: none. Called patient for CT follow up. She states has been feeling good. Returned to work this week. Had follow up with Dr. Héctor Hupmhreys last week and was told no need for cardiac work up at this time. States she was told not to resume Lisinopril/HCTZ or Spironolactone. Reports BP this morning 112/60. States she was instructed to follow up with PCP in 2 weeks. Patient to call PCP office and schedule. She plans to discuss starting Lisinopril (due to DM) with PCP. ACM will follow up with patient in 3 weeks to discuss plan of care & assess for ongoing needs. Addressed changes since last contact:   discuss POC after cardiology OV, monitor BP  Discussed follow-up appointments. If no appointment was previously scheduled, appointment scheduling offered: No.   Is follow up appointment scheduled within 7 days of discharge? Yes. Follow Up  Future Appointments   Date Time Provider Beck Campos   10/6/2023  7:30 AM DEVI Solis - KRYSTIN [facility]     Non-BS follow up appointment(s): None    Care Transition Nurse reviewed medical action plan with patient and discussed any barriers to care and/or understanding of plan of care after discharge. Discussed appropriate site of care based on symptoms and resources available to patient including: PCP. The patient agrees to contact the PCP office for questions related to their healthcare.      Advance Care Planning:   not on file. Patients top risk factors for readmission: medical condition-recent hospitalization for vasovagal syncope; history DM, HLD  Interventions to address risk factors:  Reinforced importance of PCP follow up       Care Transitions Subsequent and Final Call    Subsequent and Final Calls  Do you have any ongoing symptoms?: No  Have your medications changed?: No  Do you have any questions related to your medications?: No  Do you currently have any active services?: No  Do you have any needs or concerns that I can assist you with?: No  Identified Barriers: None  Care Transitions Interventions  Other Interventions:             Care Transition Nurse provided contact information for future needs. Plan for follow up call in 14-21 days  based on severity of symptoms and risk factors. Plan for next call:  discuss plan of care after PCP follow up , assess for ongoing needs    KIRSTEN Gonzalez RN  Associate Care Manager  Phone: [phone]  Email: [email or web address]

## 2023-03-07 ENCOUNTER — CARE COORDINATION (OUTPATIENT)
Dept: OTHER | Facility: CLINIC | Age: 53
End: 2023-03-07

## 2023-03-09 NOTE — CARE COORDINATION
ACM attempted to reach patient for follow up call regarding PCP follow up and BP. HIPAA compliant message left requesting a return phone call at patients convenience. Will continue to follow. Colletta Schiff, MSN RN  Associate Care Manager  Phone: [phone]  Email: [email or web address]

## 2023-03-16 ENCOUNTER — CARE COORDINATION (OUTPATIENT)
Dept: OTHER | Facility: CLINIC | Age: 53
End: 2023-03-16

## 2023-03-16 NOTE — CARE COORDINATION
[address] Care Transitions Follow Up Call    Patient Current Location: [address]  Transition Nurse contacted the patient by telephone to follow up after admission on 2/3/2023. Verified name and  with patient as identifiers. Patient: Paulo Cardona  Patient : 1970   MRN: [id number]  Reason for Admission: Vasovagal syncope  Discharge Date: 23 RARS: Readmission Risk Score: 9.8      Needs to be reviewed by the provider   Additional needs identified to be addressed with provider: No  none             Method of communication with provider: none. Called patient for CT follow up. She states BP and blood sugar good. [facility] did not yet have PCP follow up. She reports had PCP OV scheduled but had to cancel due to busy at work and unable to get away for the appt. She is planning to reschedule. [facility] work has been busy and they have been working short handed. Patient still wanting to get started on Lisinopril (due to DM). She plans to either ask PCP about this or planning to schedule with Terrie Dos Santos, pharmacist with DM management at [facility] and may ask her about it. Of note, patient states now believes the syncope that took her to ED on 2/3 may have been related to norovirus as she states her family was sick with this around that time and co-worker had same virus. Patient denies needs or concerns for ACM. Signing off. Episode ended. Addressed changes since last contact:  discuss plan of care after PCP follow up , assess for ongoing needs  Discussed follow-up appointments. If no appointment was previously scheduled, appointment scheduling offered: No.   Is follow up appointment scheduled within 7 days of discharge? Yes.     Follow Up  Future Appointments   Date Time Provider Beck Campos   10/6/2023  7:30 AM DEVI Lin CNP OB/Gyn [facility]     Non-[facility] follow up appointment(s): None    Care Transition Nurse reviewed medical action plan with patient and discussed any barriers to care and/or understanding of plan of care after discharge. Discussed appropriate site of care based on symptoms and resources available to patient including: PCP  Specialist  Benefits related nurse triage line. The patient agrees to contact the PCP office for questions related to their healthcare. Advance Care Planning:   not on file. Patients top risk factors for readmission: medical condition-history DM, HLD  Interventions to address risk factors:  ACM reinforced importance of PCP follow up. Care Transitions Subsequent and Final Call    Subsequent and Final Calls  Do you have any ongoing symptoms?: No  Have your medications changed?: No  Do you have any questions related to your medications?: No  Do you currently have any active services?: No  Do you have any needs or concerns that I can assist you with?: No  Identified Barriers: None  Care Transitions Interventions  Other Interventions:             Care Transition Nurse provided contact information for future needs. No further follow-up call indicated based on severity of symptoms and risk factors. Bee Carr MSN RN  Associate Care Manager  Phone: [phone]  Email: [email or web address]

## 2023-05-01 RX ORDER — GEMFIBROZIL 600 MG/1
TABLET, FILM COATED ORAL
Qty: 180 TABLET | Refills: 5 | Status: SHIPPED | OUTPATIENT
Start: 2023-05-01

## 2023-05-01 RX ORDER — LEVOTHYROXINE SODIUM 88 UG/1
TABLET ORAL
Qty: 90 TABLET | Refills: 5 | Status: SHIPPED | OUTPATIENT
Start: 2023-05-01

## 2023-05-01 RX ORDER — ATORVASTATIN CALCIUM 20 MG/1
TABLET, FILM COATED ORAL
Qty: 90 TABLET | Refills: 5 | Status: SHIPPED | OUTPATIENT
Start: 2023-05-01

## 2023-05-01 RX ORDER — INSULIN GLARGINE 100 [IU]/ML
60 INJECTION, SOLUTION SUBCUTANEOUS 2 TIMES DAILY
Qty: 45 ML | Refills: 5 | Status: SHIPPED | OUTPATIENT
Start: 2023-05-01

## 2023-05-03 ENCOUNTER — TELEPHONE (OUTPATIENT)
Dept: PHARMACY | Age: 53
End: 2023-05-03

## 2023-05-03 NOTE — TELEPHONE ENCOUNTER
Patient called requesting f/u appt for Diabetes Med Mgmt. She states she has joined a weight loss program and is down over 20lb. She has gotten her BG under better control and has reduced her insulin down from 60U BID to 30U BID. She would like to review her BG trends and assess further insulin de-escalation.  Scheduled for 5/9/23 at 8am.

## 2023-05-09 ENCOUNTER — HOSPITAL ENCOUNTER (OUTPATIENT)
Dept: PHARMACY | Age: 53
Setting detail: THERAPIES SERIES
Discharge: HOME | End: 2023-05-09
Payer: COMMERCIAL

## 2023-05-09 VITALS
SYSTOLIC BLOOD PRESSURE: 133 MMHG | WEIGHT: 215.3 LBS | HEART RATE: 71 BPM | OXYGEN SATURATION: 98 % | DIASTOLIC BLOOD PRESSURE: 83 MMHG | TEMPERATURE: 97.5 F | BODY MASS INDEX: 31.79 KG/M2

## 2023-05-09 LAB — HBA1C MFR BLD: 5.8 %

## 2023-05-09 PROCEDURE — 83036 HEMOGLOBIN GLYCOSYLATED A1C: CPT

## 2023-05-09 PROCEDURE — 99212 OFFICE O/P EST SF 10 MIN: CPT

## 2023-05-09 RX ORDER — INSULIN GLARGINE 100 [IU]/ML
INJECTION, SOLUTION SUBCUTANEOUS
Qty: 23 ADJUSTABLE DOSE PRE-FILLED PEN SYRINGE | Refills: 1 | Status: SHIPPED | OUTPATIENT
Start: 2023-05-09

## 2023-05-09 NOTE — PROGRESS NOTES
medications/testing supplies). Yes: [facility] Antonio [facility]any [address] mail order    Labs needed (A1c, lipids, microalbumin, SCr etc) Yes: A1C due - completed POC in office today  Lipids due in September 2023    Up to date with eye/foot exams  Due in September 2023    Patient is on appropriate statin, ACE/ARB, and ASA no: Patient was taken off Lisinopril. HCTZ and Aldactone while in the hospital due to low BP/syncopal episode. She states she has been feeling fine since being off the medications but is concerned not being on ACE for renal protection in diabetes. PLAN   Diabetic Action Plan is shown below. Patient and provider worked together to create goals which patient will work toward prior to the next appointment. Physician Follow-up:  As scheduled    Medication Management Follow-up:   Diabetes Service  1 month in office. Will f/u regarding insulin titration within 1 week.     Electronically signed by Radha Aguilar [facility] on 5/9/2023 at 8:02 AM    For Pharmacy Admin Tracking Only    Program: Medication Management  CPA in place:  Yes  Recommendation Provided To: Patient/Caregiver: 1 via In person  Intervention Detail: Dose Adjustment: 1, reason: Therapy De-escalation, Lab(s) Ordered, and Refill(s) Provided  Intervention Accepted By: Patient/Caregiver: 3  Gap Closed?: Yes   Time Spent (min): 60

## 2023-05-12 ENCOUNTER — HOSPITAL ENCOUNTER (OUTPATIENT)
Dept: PHARMACY | Age: 53
Setting detail: THERAPIES SERIES
Discharge: HOME | End: 2023-05-12
Payer: COMMERCIAL

## 2023-05-19 ENCOUNTER — HOSPITAL ENCOUNTER (OUTPATIENT)
Dept: PHARMACY | Age: 53
Setting detail: THERAPIES SERIES
Discharge: HOME | End: 2023-05-19
Payer: COMMERCIAL

## 2023-05-22 ENCOUNTER — OFFICE VISIT (OUTPATIENT)
Dept: FAMILY MEDICINE CLINIC | Age: 53
End: 2023-05-22
Payer: COMMERCIAL

## 2023-05-22 VITALS
SYSTOLIC BLOOD PRESSURE: 132 MMHG | OXYGEN SATURATION: 98 % | BODY MASS INDEX: 32.4 KG/M2 | WEIGHT: 219.4 LBS | HEART RATE: 66 BPM | DIASTOLIC BLOOD PRESSURE: 86 MMHG | TEMPERATURE: 97 F

## 2023-05-22 DIAGNOSIS — E11.9 TYPE 2 DIABETES MELLITUS WITHOUT COMPLICATION, WITH LONG-TERM CURRENT USE OF INSULIN (HCC): Primary | ICD-10-CM

## 2023-05-22 DIAGNOSIS — Z79.4 TYPE 2 DIABETES MELLITUS WITHOUT COMPLICATION, WITH LONG-TERM CURRENT USE OF INSULIN (HCC): Primary | ICD-10-CM

## 2023-05-22 PROCEDURE — 3044F HG A1C LEVEL LT 7.0%: CPT | Performed by: FAMILY MEDICINE

## 2023-05-22 PROCEDURE — 99213 OFFICE O/P EST LOW 20 MIN: CPT | Performed by: FAMILY MEDICINE

## 2023-05-22 PROCEDURE — 3079F DIAST BP 80-89 MM HG: CPT | Performed by: FAMILY MEDICINE

## 2023-05-22 PROCEDURE — 3075F SYST BP GE 130 - 139MM HG: CPT | Performed by: FAMILY MEDICINE

## 2023-05-22 RX ORDER — LISINOPRIL 5 MG/1
5 TABLET ORAL DAILY
Qty: 90 TABLET | Refills: 1 | Status: SHIPPED | OUTPATIENT
Start: 2023-05-22

## 2023-05-22 RX ORDER — SEMAGLUTIDE 2.68 MG/ML
2 INJECTION, SOLUTION SUBCUTANEOUS WEEKLY
Qty: 6 ML | Refills: 1 | Status: SHIPPED | OUTPATIENT
Start: 2023-05-22

## 2023-05-22 ASSESSMENT — PATIENT HEALTH QUESTIONNAIRE - PHQ9
SUM OF ALL RESPONSES TO PHQ QUESTIONS 1-9: 0
SUM OF ALL RESPONSES TO PHQ QUESTIONS 1-9: 0
SUM OF ALL RESPONSES TO PHQ9 QUESTIONS 1 & 2: 0
SUM OF ALL RESPONSES TO PHQ QUESTIONS 1-9: 0
1. LITTLE INTEREST OR PLEASURE IN DOING THINGS: 0
SUM OF ALL RESPONSES TO PHQ QUESTIONS 1-9: 0
2. FEELING DOWN, DEPRESSED OR HOPELESS: 0

## 2023-05-22 NOTE — PROGRESS NOTES
Luci Prince is a 47 y.o. female who presents today for follow up on her  medical conditions as noted below. Luci Prince is c/o of   Chief Complaint   Patient presents with    Diabetes       Patient Active Problem List:     Anxiety     Atopic dermatitis     Atopic rhinitis     Benign essential hypertension     Complicated varicose veins     Gastroesophageal reflux disease     Hyperlipidemia     Hypothyroidism     Low HDL (under 40)     Mass of left thigh     Mild intermittent asthma with acute exacerbation     Obesity (BMI 30-39. 9)     Osteochondroma of left fibula     Diabetes ([address]     Vitamin D deficiency     Vasovagal syncope     Micturition syncope     S/p dental crown     Orthostatic hypotension     Hypokalemia     Leukocytosis     Syncope and collapse     Past Medical History:   Diagnosis Date    Diabetes mellitus type 2, insulin dependent (HCC)     GERD (gastroesophageal reflux disease)     Hypothyroid       Past Surgical History:   Procedure Laterality Date     SECTION       SECTION      ENDOMETRIAL ABLATION      TUBAL LIGATION      UMBILICAL HERNIA REPAIR       Family History   Problem Relation Age of Onset    Ovarian Cancer Mother     Other Father         intracranial hemorrhage     Ovarian Cancer Maternal Grandmother     Stroke Maternal Grandmother     Diabetes Maternal Grandfather     Rheum Arthritis Paternal Grandmother     Diabetes Paternal Grandfather     High Blood Pressure Paternal Grandfather     Colon Cancer Paternal Grandfather     Colon Cancer Brother     Cancer Brother         AML     Current Outpatient Medications   Medication Sig Dispense Refill    lisinopril (PRINIVIL;ZESTRIL) 5 MG tablet Take 1 tablet by mouth daily 90 tablet 1    Semaglutide, 2 MG/DOSE, (OZEMPIC, 2 MG/DOSE,) 8 MG/3ML SOPN Inject 2 mg into the skin once a week 6 mL 1    Cyanocobalamin (VITAMIN B-12) 500 MCG LOZG Take by mouth      insulin glargine (LANTUS SOLOSTAR) 100 UNIT/ML

## 2023-06-02 ENCOUNTER — APPOINTMENT (OUTPATIENT)
Dept: PHARMACY | Age: 53
End: 2023-06-02
Payer: COMMERCIAL

## 2023-06-07 ENCOUNTER — HOSPITAL ENCOUNTER (OUTPATIENT)
Dept: PHARMACY | Age: 53
Setting detail: THERAPIES SERIES
Discharge: HOME | End: 2023-06-07
Payer: COMMERCIAL

## 2023-06-07 VITALS
OXYGEN SATURATION: 98 % | WEIGHT: 211.4 LBS | HEART RATE: 61 BPM | DIASTOLIC BLOOD PRESSURE: 83 MMHG | BODY MASS INDEX: 31.22 KG/M2 | TEMPERATURE: 96.9 F | SYSTOLIC BLOOD PRESSURE: 131 MMHG

## 2023-06-07 PROCEDURE — 99212 OFFICE O/P EST SF 10 MIN: CPT

## 2023-06-07 RX ORDER — BLOOD-GLUCOSE SENSOR
EACH MISCELLANEOUS
Qty: 9 EACH | Refills: 2 | Status: SHIPPED | OUTPATIENT
Start: 2023-06-07

## 2023-06-07 NOTE — PROGRESS NOTES
Diabetic Medication Management Program  [address]  Phone: [phone]  Fax: [phone]    NAME: Griselda Nyhan RECORD NUMBER:  [id number]  AGE: 47 y.o. GENDER: female  : 1970  EPISODE DATE:  2023     Ms. Taty Alvarenga was referred to [facility] by Dr. Dajuan Clayton. Patient acknowledges working in consult agreement with clinical pharmacist and this provider. Goals per referral:   Fasting blood glucose: < 130  Peak postprandial glucose: < 180  A1C: < 7    SUBJECTIVE     Ms. Taty Alvarenga is a 47 y.o. female here for the Diabetes Service for self-management education, medication review including over the counter medications and herbal products, overall wellbeing assessment, transition of care and any needed adjustments with updates and recommendations communicated to the referring physician. Patient Findings:   Medication changes  Yes: Lisinopril added with PCP - Ozempic increase to 2mg  Diet changes  Yes: continues to follow [employer] and has been focusing on smaller bfast  Activity changes  no  Emergency Room Visit or Hospitalization  no  Acute Illness/new problems  no  Symptoms of hypoglycemia  yes - dizziness when BG was rapidly dropping 189 to 112  Symptoms of hyperglycemia  no - none  Medication adverse reactions   Nausea 2 days after injection of Ozempic. One episode of projectile vomiting     Comments: Episode of vomiting explained due to eating pattern that day. Educated patient to keep small snacks on hand to avoid going too long without eating even with hectic work schedule. OBJECTIVE     PMHx:    Past Medical History:   Diagnosis Date    Diabetes mellitus type 2, insulin dependent (HCC)     GERD (gastroesophageal reflux disease)     Hypothyroid      Social History:    Social History     Tobacco Use    Smoking status: Never    Smokeless tobacco: Never   Substance Use Topics    Alcohol use:  Yes

## 2023-07-03 RX ORDER — OMEPRAZOLE 20 MG/1
CAPSULE, DELAYED RELEASE ORAL
Qty: 90 CAPSULE | Refills: 1 | Status: SHIPPED | OUTPATIENT
Start: 2023-07-03

## 2023-07-03 RX ORDER — ASPIRIN 81 MG/1
TABLET, COATED ORAL
Qty: 90 TABLET | Refills: 1 | Status: SHIPPED | OUTPATIENT
Start: 2023-07-03

## 2023-07-03 NOTE — TELEPHONE ENCOUNTER
Dulce Iqbal is calling to request a refill on the following medication(s):    Last Visit Date (If Applicable):  6/58/1267    Next Visit Date:    Visit date not found    Medication Request:  Requested Prescriptions     Pending Prescriptions Disp Refills    ASPIRIN LOW DOSE 81 MG EC tablet [Pharmacy Med Name: ASPIRIN LOW DOSE 81MG TBEC] 90 tablet 1     Sig: TAKE ONE TABLET BY MOUTH EVERY DAY    omeprazole (PRILOSEC) 20 MG delayed release capsule [Pharmacy Med Name: OMEPRAZOLE 20MG CPDR] 90 capsule 1     Sig: TAKE ONE CAPSULE BY MOUTH EVERY DAY

## 2023-07-11 ENCOUNTER — HOSPITAL ENCOUNTER (OUTPATIENT)
Dept: PHARMACY | Age: 53
Setting detail: THERAPIES SERIES
Discharge: HOME | End: 2023-07-11
Payer: COMMERCIAL

## 2023-07-11 VITALS
HEART RATE: 74 BPM | DIASTOLIC BLOOD PRESSURE: 83 MMHG | BODY MASS INDEX: 31.9 KG/M2 | WEIGHT: 216 LBS | SYSTOLIC BLOOD PRESSURE: 129 MMHG | TEMPERATURE: 97.7 F | OXYGEN SATURATION: 96 %

## 2023-07-11 PROCEDURE — 99212 OFFICE O/P EST SF 10 MIN: CPT

## 2023-07-11 NOTE — PROGRESS NOTES
Diabetic Medication Management Program  [address] [place] [address] [address]  Phone: [phone]  Fax: [phone]    NAME: Erendira [place] RECORD NUMBER:  [id number]  AGE: 47 y.o. GENDER: female  : 1970  EPISODE DATE:  2023     Ms. Fredy Lane was referred to [facility] by Dr. Jaswinder Shi. Patient acknowledges working in consult agreement with clinical pharmacist and this provider. Goals per referral:   Fasting blood glucose: < 130  Peak postprandial glucose: < 180  A1C: < 7    SUBJECTIVE     Ms. Fredy Lane is a 47 y.o. female here for the Diabetes Service for self-management education, medication review including over the counter medications and herbal products, overall wellbeing assessment, transition of care and any needed adjustments with updates and recommendations communicated to the referring physician. Patient Findings:     Medication changes  no  Diet changes  no  Activity changes  Yes: walking on vacation/kayak/hiking  Emergency Room Visit or Hospitalization  no  Acute Illness/new problems  no  Symptoms of hypoglycemia  no - none  Symptoms of hyperglycemia  no - none  Medication adverse reactions  GI upset - nausea and vomiting for the first 2 days after injecting Ozempic. Comments:  Patient states the 2 mg dose has been much harder to tolerate. Educated patient about MOA and to further alter dietary intake to reduce these VALARIE. If still intolerable we can reduce dose. Patient will also try to change her dietary intake one day prior to injection day along with the next 2 days. OBJECTIVE     PMHx:    Past Medical History:   Diagnosis Date    Diabetes mellitus type 2, insulin dependent (HCC)     GERD (gastroesophageal reflux disease)     Hypothyroid      Social History:    Social History     Tobacco Use    Smoking status: Never    Smokeless tobacco: Never   Substance Use Topics    Alcohol use:  Yes

## 2023-07-12 DIAGNOSIS — Z79.4 TYPE 2 DIABETES MELLITUS WITHOUT COMPLICATION, WITH LONG-TERM CURRENT USE OF INSULIN (HCC): Primary | ICD-10-CM

## 2023-07-12 DIAGNOSIS — E11.9 TYPE 2 DIABETES MELLITUS WITHOUT COMPLICATION, WITH LONG-TERM CURRENT USE OF INSULIN (HCC): Primary | ICD-10-CM

## 2023-07-18 ENCOUNTER — APPOINTMENT (OUTPATIENT)
Dept: PHARMACY | Age: 53
End: 2023-07-18
Payer: COMMERCIAL

## 2023-07-18 ENCOUNTER — TELEPHONE (OUTPATIENT)
Dept: PHARMACY | Age: 53
End: 2023-07-18

## 2023-07-18 DIAGNOSIS — E11.9 TYPE 2 DIABETES MELLITUS WITHOUT COMPLICATION, WITH LONG-TERM CURRENT USE OF INSULIN (HCC): ICD-10-CM

## 2023-07-18 DIAGNOSIS — Z79.4 TYPE 2 DIABETES MELLITUS WITHOUT COMPLICATION, WITH LONG-TERM CURRENT USE OF INSULIN (HCC): ICD-10-CM

## 2023-07-18 RX ORDER — SEMAGLUTIDE 2.68 MG/ML
2 INJECTION, SOLUTION SUBCUTANEOUS WEEKLY
Qty: 6 ML | Refills: 1 | Status: SHIPPED | OUTPATIENT
Start: 2023-07-18

## 2023-07-18 NOTE — TELEPHONE ENCOUNTER
Spoke with patient via phone to check in on nausea/vomiting she was experiencing with Ozempic 2mg. She was to try further altering her dietary intake the day before through one day after her injection. She states she had a half sandwich and otherwise fruits/veggies as she planned and did not have any vomiting. She notes still some nausea. Also she notes a headache that startes 36 hours after injection on the right side of her head above her eye brow that radiates down her neck. She took Motrin 800mg TID this last week to help. Advised to monitor the frequency/duration/changes of this headache and follow up with PCP if not improved. Also advised to limit high dose motrin use and be sure to take with food to avoid further GI upset.

## 2023-07-21 RX ORDER — SEMAGLUTIDE 1.34 MG/ML
1 INJECTION, SOLUTION SUBCUTANEOUS WEEKLY
Qty: 9 ML | Refills: 0 | Status: SHIPPED | OUTPATIENT
Start: 2023-07-21

## 2023-08-10 ENCOUNTER — HOSPITAL ENCOUNTER (OUTPATIENT)
Dept: PHARMACY | Age: 53
Setting detail: THERAPIES SERIES
Discharge: HOME | End: 2023-08-10
Payer: COMMERCIAL

## 2023-08-10 VITALS
HEART RATE: 73 BPM | WEIGHT: 219.3 LBS | DIASTOLIC BLOOD PRESSURE: 89 MMHG | OXYGEN SATURATION: 96 % | SYSTOLIC BLOOD PRESSURE: 134 MMHG | BODY MASS INDEX: 32.38 KG/M2 | TEMPERATURE: 97.5 F

## 2023-08-10 LAB — HBA1C MFR BLD: 6.2 %

## 2023-08-10 PROCEDURE — 83036 HEMOGLOBIN GLYCOSYLATED A1C: CPT

## 2023-08-10 PROCEDURE — 99212 OFFICE O/P EST SF 10 MIN: CPT

## 2023-08-10 NOTE — PROGRESS NOTES
01/08/2020    Pneumococcal, PPSV23, PNEUMOVAX 23, (age 2y+), SC/IM, 0.5mL 11/20/2015    TDaP, ADACEL (age 6y-58y), Moon Soler (age 10y+), IM, 0.5mL 02/26/2020     Home Blood Glucose Results: See below                              Blood glucose trends noted:  Meeting CGM goals    ASSESSMENT     Recent A1c: 6.2 TODAY! Lab Results   Component Value Date    LABA1C 6.2 08/10/2023    LABA1C 5.8 05/09/2023    LABA1C 7.5 (H) 12/22/2022      Eating patterns: Last couple weeks doing crockpot meals - some not low carb - continues to use portion controls. Using weight watchers daily. Exercise patterns: Working in the yard more. Blood Glucose Testing: Patient is currently using CGM    Current Diabetic Medications: Ozempic 1mg, Metformin 1000mg BID, Lantus 55U     Diabetic Regimen/Compliance: Compliant     Other Medication Compliance: Compliant    Refills needed (diabetes medications/testing supplies). Yes: Pen Needles - patient to call clinic back with preferred needle length    Labs needed (A1c, lipids, microalbumin, SCr etc) Yes: A1c    Up to date with eye/foot exams Yes    Patient is on appropriate statin, ACE/ARB, and ASA Yes    PLAN   Diabetic Action Plan is shown below. Patient and provider worked together to create goals which patient will work toward prior to the next appointment.       Physician Follow-up:  As scheduled    Medication Management Follow-up:   Diabetes Service  3 months    Electronically signed by MILAD Davis [facility] on 8/10/2023 at 7:09 AM    For Pharmacy Admin Tracking Only    Program: Medication Management  CPA in place:  Yes  Recommendation Provided To: Patient/Caregiver: 1 via In person  Intervention Detail: Lab(s) Ordered and Refill(s) Provided  Intervention Accepted By: Patient/Caregiver: 2  Gap Closed?: Yes   Time Spent (min): 60

## 2023-08-14 ENCOUNTER — TELEPHONE (OUTPATIENT)
Dept: PHARMACY | Facility: CLINIC | Age: 53
End: 2023-08-14

## 2023-08-14 NOTE — TELEPHONE ENCOUNTER
As of 8/8/23 patient has used $713 of the maximum of $600 a year in waived co pays for specific medications and pharmacy-related supplies through the [employer] for [employer] DM Program.    Patient currently enrolled in the DM Program and has used all of the maximum of $600 a year in waived co pays for specific medications and pharmacy-related supplies through the [employer]. Courtesy call placed to patient to advise them of the above information. Spoke to patient and advised them of the above information. Patient verified understanding.         Gaetano Chaudhary   Phone: [phone], option #3       For Pharmacy Admin Tracking Only    Program: Luda in place:  No  Time Spent (min): 5

## 2023-08-20 DIAGNOSIS — Z79.4 TYPE 2 DIABETES MELLITUS WITHOUT COMPLICATION, WITH LONG-TERM CURRENT USE OF INSULIN (HCC): ICD-10-CM

## 2023-08-20 DIAGNOSIS — E11.9 TYPE 2 DIABETES MELLITUS WITHOUT COMPLICATION, WITH LONG-TERM CURRENT USE OF INSULIN (HCC): ICD-10-CM

## 2023-08-21 RX ORDER — ASPIRIN 81 MG/1
81 TABLET ORAL DAILY
Qty: 90 TABLET | Refills: 1 | Status: SHIPPED | OUTPATIENT
Start: 2023-08-21

## 2023-08-21 RX ORDER — LISINOPRIL 5 MG/1
5 TABLET ORAL DAILY
Qty: 90 TABLET | Refills: 1 | Status: SHIPPED | OUTPATIENT
Start: 2023-08-21

## 2023-08-21 RX ORDER — LISINOPRIL 5 MG/1
TABLET ORAL
Qty: 90 TABLET | Refills: 1 | Status: SHIPPED | OUTPATIENT
Start: 2023-08-21

## 2023-09-01 NOTE — TELEPHONE ENCOUNTER
Fei Jolly is calling to request a refill on the following medication(s):    Last Visit Date (If Applicable):  Visit date not found    Next Visit Date:    Visit date not found    Medication Request:  Requested Prescriptions     Pending Prescriptions Disp Refills    aspirin (ASPIRIN LOW DOSE) 81 MG EC tablet 90 tablet 1 RT will encourage pts participation is group sessions offer support as needed .

## 2023-09-28 RX ORDER — SEMAGLUTIDE 1.34 MG/ML
INJECTION, SOLUTION SUBCUTANEOUS
Qty: 9 ML | Refills: 0 | Status: SHIPPED | OUTPATIENT
Start: 2023-09-28

## 2023-10-02 ENCOUNTER — APPOINTMENT (OUTPATIENT)
Dept: CT IMAGING | Age: 53
End: 2023-10-02
Payer: COMMERCIAL

## 2023-10-02 ENCOUNTER — HOSPITAL ENCOUNTER (EMERGENCY)
Age: 53
Discharge: HOME | End: 2023-10-02
Attending: EMERGENCY MEDICINE
Payer: COMMERCIAL

## 2023-10-02 VITALS
RESPIRATION RATE: 20 BRPM | TEMPERATURE: 97.7 F | WEIGHT: 220 LBS | SYSTOLIC BLOOD PRESSURE: 151 MMHG | BODY MASS INDEX: 32.49 KG/M2 | OXYGEN SATURATION: 93 % | DIASTOLIC BLOOD PRESSURE: 78 MMHG | HEART RATE: 62 BPM

## 2023-10-02 DIAGNOSIS — R51.9 ACUTE NONINTRACTABLE HEADACHE, UNSPECIFIED HEADACHE TYPE: Primary | ICD-10-CM

## 2023-10-02 DIAGNOSIS — I10 HYPERTENSION, UNSPECIFIED TYPE: ICD-10-CM

## 2023-10-02 LAB
ANION GAP SERPL CALCULATED.3IONS-SCNC: 12 MMOL/L (ref 9–17)
BASOPHILS # BLD: 0.04 K/UL (ref 0–0.2)
BASOPHILS NFR BLD: 0 % (ref 0–2)
BUN SERPL-MCNC: 12 MG/DL (ref 6–20)
BUN/CREAT SERPL: 24 (ref 9–20)
CALCIUM SERPL-MCNC: 9 MG/DL (ref 8.6–10.4)
CHLORIDE SERPL-SCNC: 98 MMOL/L (ref 98–107)
CO2 SERPL-SCNC: 25 MMOL/L (ref 20–31)
CREAT SERPL-MCNC: 0.5 MG/DL (ref 0.5–0.9)
EOSINOPHIL # BLD: 0.08 K/UL (ref 0–0.44)
EOSINOPHILS RELATIVE PERCENT: 1 % (ref 1–4)
ERYTHROCYTE [DISTWIDTH] IN BLOOD BY AUTOMATED COUNT: 12.6 % (ref 11.8–14.4)
GFR SERPL CREATININE-BSD FRML MDRD: >60 ML/MIN/1.73M2
GLUCOSE SERPL-MCNC: 303 MG/DL (ref 70–99)
HCT VFR BLD AUTO: 36.1 % (ref 36.3–47.1)
HGB BLD-MCNC: 12.1 G/DL (ref 11.9–15.1)
IMM GRANULOCYTES # BLD AUTO: 0.03 K/UL (ref 0–0.3)
IMM GRANULOCYTES NFR BLD: 0 %
LYMPHOCYTES NFR BLD: 1 K/UL (ref 1.1–3.7)
LYMPHOCYTES RELATIVE PERCENT: 11 % (ref 24–43)
MCH RBC QN AUTO: 27 PG (ref 25.2–33.5)
MCHC RBC AUTO-ENTMCNC: 33.5 G/DL (ref 28.4–34.8)
MCV RBC AUTO: 80.6 FL (ref 82.6–102.9)
MONOCYTES NFR BLD: 0.26 K/UL (ref 0.1–1.2)
MONOCYTES NFR BLD: 3 % (ref 3–12)
NEUTROPHILS NFR BLD: 85 % (ref 36–65)
NEUTS SEG NFR BLD: 7.61 K/UL (ref 1.5–8.1)
NRBC BLD-RTO: 0 PER 100 WBC
PLATELET # BLD AUTO: 239 K/UL (ref 138–453)
PMV BLD AUTO: 9.6 FL (ref 8.1–13.5)
POTASSIUM SERPL-SCNC: 3.6 MMOL/L (ref 3.7–5.3)
RBC # BLD AUTO: 4.48 M/UL (ref 3.95–5.11)
RBC # BLD: ABNORMAL 10*6/UL
SODIUM SERPL-SCNC: 135 MMOL/L (ref 135–144)
WBC OTHER # BLD: 9 K/UL (ref 3.5–11.3)

## 2023-10-02 PROCEDURE — 6360000002 HC RX W HCPCS: Performed by: EMERGENCY MEDICINE

## 2023-10-02 PROCEDURE — 96376 TX/PRO/DX INJ SAME DRUG ADON: CPT

## 2023-10-02 PROCEDURE — 70450 CT HEAD/BRAIN W/O DYE: CPT

## 2023-10-02 PROCEDURE — 96375 TX/PRO/DX INJ NEW DRUG ADDON: CPT

## 2023-10-02 PROCEDURE — 6360000002 HC RX W HCPCS: Performed by: NURSE PRACTITIONER

## 2023-10-02 PROCEDURE — 99285 EMERGENCY DEPT VISIT HI MDM: CPT

## 2023-10-02 PROCEDURE — 6360000004 HC RX CONTRAST MEDICATION: Performed by: NURSE PRACTITIONER

## 2023-10-02 PROCEDURE — 6370000000 HC RX 637 (ALT 250 FOR IP): Performed by: NURSE PRACTITIONER

## 2023-10-02 PROCEDURE — 2580000003 HC RX 258: Performed by: EMERGENCY MEDICINE

## 2023-10-02 PROCEDURE — 2580000003 HC RX 258: Performed by: NURSE PRACTITIONER

## 2023-10-02 PROCEDURE — 96372 THER/PROPH/DIAG INJ SC/IM: CPT

## 2023-10-02 PROCEDURE — 80048 BASIC METABOLIC PNL TOTAL CA: CPT

## 2023-10-02 PROCEDURE — 85025 COMPLETE CBC W/AUTO DIFF WBC: CPT

## 2023-10-02 PROCEDURE — 70498 CT ANGIOGRAPHY NECK: CPT

## 2023-10-02 PROCEDURE — 96374 THER/PROPH/DIAG INJ IV PUSH: CPT

## 2023-10-02 RX ORDER — SODIUM CHLORIDE 0.9 % (FLUSH) 0.9 %
10 SYRINGE (ML) INJECTION PRN
Status: DISCONTINUED | OUTPATIENT
Start: 2023-10-02 | End: 2023-10-03 | Stop reason: HOSPADM

## 2023-10-02 RX ORDER — KETOROLAC TROMETHAMINE 30 MG/ML
30 INJECTION, SOLUTION INTRAMUSCULAR; INTRAVENOUS ONCE
Status: COMPLETED | OUTPATIENT
Start: 2023-10-02 | End: 2023-10-02

## 2023-10-02 RX ORDER — LORAZEPAM 2 MG/ML
1 INJECTION INTRAMUSCULAR ONCE
Status: COMPLETED | OUTPATIENT
Start: 2023-10-02 | End: 2023-10-02

## 2023-10-02 RX ORDER — ONDANSETRON 4 MG/1
4 TABLET, ORALLY DISINTEGRATING ORAL 3 TIMES DAILY PRN
Qty: 21 TABLET | Refills: 0 | Status: SHIPPED | OUTPATIENT
Start: 2023-10-02

## 2023-10-02 RX ORDER — HALOPERIDOL 5 MG/ML
2.5 INJECTION INTRAMUSCULAR ONCE
Status: COMPLETED | OUTPATIENT
Start: 2023-10-02 | End: 2023-10-02

## 2023-10-02 RX ORDER — HYDRALAZINE HYDROCHLORIDE 20 MG/ML
10 INJECTION INTRAMUSCULAR; INTRAVENOUS ONCE
Status: COMPLETED | OUTPATIENT
Start: 2023-10-02 | End: 2023-10-02

## 2023-10-02 RX ORDER — 0.9 % SODIUM CHLORIDE 0.9 %
1000 INTRAVENOUS SOLUTION INTRAVENOUS ONCE
Status: COMPLETED | OUTPATIENT
Start: 2023-10-02 | End: 2023-10-02

## 2023-10-02 RX ORDER — FENTANYL CITRATE 0.05 MG/ML
50 INJECTION, SOLUTION INTRAMUSCULAR; INTRAVENOUS ONCE
Status: COMPLETED | OUTPATIENT
Start: 2023-10-02 | End: 2023-10-02

## 2023-10-02 RX ORDER — DIPHENHYDRAMINE HYDROCHLORIDE 50 MG/ML
25 INJECTION INTRAMUSCULAR; INTRAVENOUS ONCE
Status: COMPLETED | OUTPATIENT
Start: 2023-10-02 | End: 2023-10-02

## 2023-10-02 RX ORDER — 0.9 % SODIUM CHLORIDE 0.9 %
80 INTRAVENOUS SOLUTION INTRAVENOUS ONCE
Status: COMPLETED | OUTPATIENT
Start: 2023-10-02 | End: 2023-10-02

## 2023-10-02 RX ORDER — LISINOPRIL 10 MG/1
5 TABLET ORAL ONCE
Status: COMPLETED | OUTPATIENT
Start: 2023-10-02 | End: 2023-10-02

## 2023-10-02 RX ORDER — DEXAMETHASONE SODIUM PHOSPHATE 10 MG/ML
8 INJECTION, SOLUTION INTRAMUSCULAR; INTRAVENOUS ONCE
Status: COMPLETED | OUTPATIENT
Start: 2023-10-02 | End: 2023-10-02

## 2023-10-02 RX ORDER — PROCHLORPERAZINE EDISYLATE 5 MG/ML
10 INJECTION INTRAMUSCULAR; INTRAVENOUS ONCE
Status: COMPLETED | OUTPATIENT
Start: 2023-10-02 | End: 2023-10-02

## 2023-10-02 RX ADMIN — HYDRALAZINE HYDROCHLORIDE 10 MG: 20 INJECTION, SOLUTION INTRAMUSCULAR; INTRAVENOUS at 22:16

## 2023-10-02 RX ADMIN — DEXAMETHASONE SODIUM PHOSPHATE 8 MG: 10 INJECTION, SOLUTION INTRAMUSCULAR; INTRAVENOUS at 15:16

## 2023-10-02 RX ADMIN — SODIUM CHLORIDE 80 ML: 9 INJECTION, SOLUTION INTRAVENOUS at 16:53

## 2023-10-02 RX ADMIN — SODIUM CHLORIDE 1000 ML: 9 INJECTION, SOLUTION INTRAVENOUS at 18:15

## 2023-10-02 RX ADMIN — DIPHENHYDRAMINE HYDROCHLORIDE 25 MG: 50 INJECTION INTRAMUSCULAR; INTRAVENOUS at 15:17

## 2023-10-02 RX ADMIN — IOPAMIDOL 75 ML: 755 INJECTION, SOLUTION INTRAVENOUS at 16:53

## 2023-10-02 RX ADMIN — KETOROLAC TROMETHAMINE 30 MG: 30 INJECTION, SOLUTION INTRAMUSCULAR at 18:10

## 2023-10-02 RX ADMIN — LISINOPRIL 5 MG: 5 TABLET ORAL at 18:09

## 2023-10-02 RX ADMIN — FENTANYL CITRATE 50 MCG: 0.05 INJECTION, SOLUTION INTRAMUSCULAR; INTRAVENOUS at 16:02

## 2023-10-02 RX ADMIN — DIPHENHYDRAMINE HYDROCHLORIDE 25 MG: 50 INJECTION INTRAMUSCULAR; INTRAVENOUS at 20:21

## 2023-10-02 RX ADMIN — SODIUM CHLORIDE 1000 ML: 9 INJECTION, SOLUTION INTRAVENOUS at 15:16

## 2023-10-02 RX ADMIN — PROCHLORPERAZINE EDISYLATE 10 MG: 5 INJECTION INTRAMUSCULAR; INTRAVENOUS at 15:16

## 2023-10-02 RX ADMIN — SODIUM CHLORIDE, PRESERVATIVE FREE 10 ML: 5 INJECTION INTRAVENOUS at 16:53

## 2023-10-02 RX ADMIN — HALOPERIDOL LACTATE 2.5 MG: 5 INJECTION, SOLUTION INTRAMUSCULAR at 20:19

## 2023-10-02 RX ADMIN — KETOROLAC TROMETHAMINE 30 MG: 30 INJECTION, SOLUTION INTRAMUSCULAR; INTRAVENOUS at 20:21

## 2023-10-02 RX ADMIN — LORAZEPAM 1 MG: 2 INJECTION INTRAMUSCULAR; INTRAVENOUS at 20:19

## 2023-10-02 RX ADMIN — SODIUM CHLORIDE 1000 ML: 9 INJECTION, SOLUTION INTRAVENOUS at 20:18

## 2023-10-02 ASSESSMENT — PAIN DESCRIPTION - FREQUENCY: FREQUENCY: CONTINUOUS

## 2023-10-02 ASSESSMENT — PAIN SCALES - GENERAL
PAINLEVEL_OUTOF10: 6
PAINLEVEL_OUTOF10: 7
PAINLEVEL_OUTOF10: 10
PAINLEVEL_OUTOF10: 5
PAINLEVEL_OUTOF10: 8

## 2023-10-02 ASSESSMENT — ENCOUNTER SYMPTOMS
VOMITING: 1
BACK PAIN: 0
NAUSEA: 1
PHOTOPHOBIA: 1
SHORTNESS OF BREATH: 0
ABDOMINAL PAIN: 0

## 2023-10-02 ASSESSMENT — PAIN DESCRIPTION - DESCRIPTORS: DESCRIPTORS: SHOOTING;STABBING;THROBBING

## 2023-10-02 ASSESSMENT — VISUAL ACUITY: OU: 1

## 2023-10-02 ASSESSMENT — PAIN DESCRIPTION - LOCATION: LOCATION: HEAD

## 2023-10-02 ASSESSMENT — PAIN - FUNCTIONAL ASSESSMENT: PAIN_FUNCTIONAL_ASSESSMENT: 0-10

## 2023-10-02 NOTE — ED PROVIDER NOTES
[facility] ED  eMERGENCY dEPARTMENT eNCOUnter      Pt Name: Gomez Ibarra  MRN: [id number]  [address] [facility], [place] 1970  Date of evaluation: 10/2/2023  Provider: Priyanka Blackburn, [facility]       Chief Complaint   Patient presents with    Headache     Onset 24 hrs ago, family states hx of aneursym/stroke in family    Nausea & Vomiting         HISTORY OF PRESENT ILLNESS  (Location/Symptom, Timing/Onset, Context/Setting, Quality, Duration, Modifying Factors, Severity.)   Gomez Ibarra is a 47 y.o. female who presents to the emergency department for evaluation of right-sided headache, nausea and vomiting that started yesterday. Patient states symptoms were gradual onset but they worsened today. Denies recent fall or injury. Pain radiates to her neck at times. Complains of photophobia but no visual disturbance. No fever or chills. No chest pain or shortness of breath. Nursing Notes were reviewed. ALLERGIES     Codeine and Tricor [fenofibrate]    CURRENT MEDICATIONS       Discharge Medication List as of 10/2/2023 11:18 PM        CONTINUE these medications which have NOT CHANGED    Details   OZEMPIC, 1 MG/DOSE, 4 MG/3ML SOPN INJECT 1MG UNDER THE SKIN ONCE WEEKLY, Disp-9 mL, R-0Normal      !! lisinopril (PRINIVIL;ZESTRIL) 5 MG tablet TAKE ONE TABLET BY MOUTH ONCE A DAY, Disp-90 tablet, R-1Normal      Insulin Pen Needle 29G X 12MM MISC Disp-200 each, R-3, NormalUse to inject insulin 5 times daily      !! lisinopril (PRINIVIL;ZESTRIL) 5 MG tablet Take 1 tablet by mouth daily, Disp-90 tablet, R-1Normal      aspirin (ASPIRIN LOW DOSE) 81 MG EC tablet Take 1 tablet by mouth daily, Disp-90 tablet, R-1Normal      omeprazole (PRILOSEC) 20 MG delayed release capsule TAKE ONE CAPSULE BY MOUTH EVERY DAY, Disp-90 capsule, R-1Normal      Cholecalciferol 50 MCG (2000 UT) TABS Take by mouth dailyHistorical Med      !!  Continuous Blood Gluc Sensor (DEXCOM G7 SENSOR) MISC Change sensor every 10 days appearance and was seen in conjunction with Dr. Gregory Dumont. DDx include SAH, migraine      I will order labs including   Orders Placed This Encounter   Procedures    CT HEAD WO CONTRAST     Standing Status:   Standing     Number of Occurrences:   1     Order Specific Question:   Reason for exam:     Answer:   Right-sided headache started yesterday     Order Specific Question:   Has a \"code stroke\" or \"stroke alert\" been called? Answer:   No     Order Specific Question:   Decision Support Exception - unselect if not a suspected or confirmed emergency medical condition     Answer:   Emergency Medical Condition (MA) [1]    CTA HEAD NECK W CONTRAST     Standing Status:   Standing     Number of Occurrences:   1     Order Specific Question:   Reason for exam:     Answer:   Right-sided headache, neck pain, family history of stroke and aneurysm     Order Specific Question:   Has a \"code stroke\" or \"stroke alert\" been called? Answer:   No     Order Specific Question:   Decision Support Exception - unselect if not a suspected or confirmed emergency medical condition     Answer:   Emergency Medical Condition (MA) [1]    BMP     Standing Status:   Standing     Number of Occurrences:   1    CBC with Auto Differential     Standing Status:   Standing     Number of Occurrences:   1    Insert peripheral IV     Standing Status:   Standing     Number of Occurrences:   1          The patient was involved in his/her plan of care through shared decision making. The testing that was ordered was discussed with the patient. Any medications that may have been ordered were discussed with the patient. I have reviewed the patient's previous medical records using the electronic health record that we have available. Labs and imaging were reviewed. Imaging was reviewed and reported by the radiologist. Results showed CT head no acute intracranial abnormality. CTA head and neck no acute findings. No focal neurological deficits.

## 2023-10-02 NOTE — ED PROVIDER NOTES
eMERGENCY dEPARTMENT eNCOUnter   [address] Name: Mike Serrano  MRN: [id number]  [address] 1970  Date of evaluation: 10/2/23     Mike Serrano is a 47 y.o. female with CC: Headache (Onset 24 hrs ago, family states hx of aneursym/stroke in family) and Nausea & Vomiting      This visit was performed by both a physician and an APC. I performed all aspects of the MDM as documented. Based on the medical record the care appears appropriate. I was personally available for consultation in the Emergency Department.     The care is provided during an unprecedented national emergency due to the novel coronavirus, Leoncio Burton MD  Attending Emergency Physician                  Vane Mccauley MD  10/02/23 9486

## 2023-10-02 NOTE — ED NOTES
Pt called out stating she is still uncomfortable. Cris Salinas NP notified.       [address]  10/02/23 6302

## 2023-10-02 NOTE — ED NOTES
Pt to ed c/o HA, N/V since yesterday. Pt rates pain 10/10. Pt states hx migraines when she was younger. Pt a/o x4. Respirations equal and nonlabored. Pt speaking in full and complete sentences. Bed locked and in lowest position. Call light in reach. Family at bedside.       [place], [place]  10/02/23 9596

## 2023-10-03 RX ORDER — ICOSAPENT ETHYL 1000 MG/1
CAPSULE ORAL
Qty: 360 CAPSULE | Refills: 1 | Status: SHIPPED | OUTPATIENT
Start: 2023-10-03

## 2023-10-03 NOTE — ED PROVIDER NOTES
Patient is a signout from previous provider on staff. Patient complaining of a headache and high blood pressure. The patient was provided with multiple doses of migraine cocktails which did help improve symptoms. The patient was accompanied by the daughter who was worried about the patient's elevated blood pressure in the systolic range of 625L. It was explained to the patient and her daughter that treating for high blood pressure is not critical but could be attempted. A dose of hydralazine was ordered and the patient's blood pressure did decrease to the 635D systolic. The patient's symptoms were still present but milder than when she presented to the ER and she did wish to follow-up outpatient. The patient was instructed to follow-up with the primary care physician within 2 days and to return to the ER immediately if symptoms worsen or change. Patient and family understand and agree with the plan.         Impression: Headache, HTN     Blair Guajardo DO  10/03/23 0221

## 2023-10-04 ENCOUNTER — PATIENT MESSAGE (OUTPATIENT)
Dept: PHARMACY | Facility: CLINIC | Age: 53
End: 2023-10-04

## 2023-10-05 ENCOUNTER — OFFICE VISIT (OUTPATIENT)
Dept: FAMILY MEDICINE CLINIC | Age: 53
End: 2023-10-05
Payer: COMMERCIAL

## 2023-10-05 VITALS
BODY MASS INDEX: 33.08 KG/M2 | HEART RATE: 70 BPM | SYSTOLIC BLOOD PRESSURE: 137 MMHG | DIASTOLIC BLOOD PRESSURE: 88 MMHG | OXYGEN SATURATION: 96 % | TEMPERATURE: 97.2 F | WEIGHT: 224 LBS

## 2023-10-05 DIAGNOSIS — Z79.4 TYPE 2 DIABETES MELLITUS WITHOUT COMPLICATION, WITH LONG-TERM CURRENT USE OF INSULIN (HCC): ICD-10-CM

## 2023-10-05 DIAGNOSIS — E11.9 TYPE 2 DIABETES MELLITUS WITHOUT COMPLICATION, WITH LONG-TERM CURRENT USE OF INSULIN (HCC): ICD-10-CM

## 2023-10-05 DIAGNOSIS — G44.89 OTHER HEADACHE SYNDROME: Primary | ICD-10-CM

## 2023-10-05 PROCEDURE — 3079F DIAST BP 80-89 MM HG: CPT | Performed by: FAMILY MEDICINE

## 2023-10-05 PROCEDURE — 3075F SYST BP GE 130 - 139MM HG: CPT | Performed by: FAMILY MEDICINE

## 2023-10-05 PROCEDURE — 3044F HG A1C LEVEL LT 7.0%: CPT | Performed by: FAMILY MEDICINE

## 2023-10-05 PROCEDURE — 99213 OFFICE O/P EST LOW 20 MIN: CPT | Performed by: FAMILY MEDICINE

## 2023-10-05 RX ORDER — LISINOPRIL 10 MG/1
10 TABLET ORAL DAILY
Qty: 90 TABLET | Refills: 1 | Status: SHIPPED | OUTPATIENT
Start: 2023-10-05

## 2023-10-05 ASSESSMENT — PATIENT HEALTH QUESTIONNAIRE - PHQ9
SUM OF ALL RESPONSES TO PHQ9 QUESTIONS 1 & 2: 0
2. FEELING DOWN, DEPRESSED OR HOPELESS: 0
SUM OF ALL RESPONSES TO PHQ QUESTIONS 1-9: 0
1. LITTLE INTEREST OR PLEASURE IN DOING THINGS: 0

## 2023-10-05 NOTE — PROGRESS NOTES
General FM note    Gene Mccain is a 47 y.o. female who presents today for follow up on her  medical conditions as noted below. Gene Mccain is c/o of   Chief Complaint   Patient presents with    Migraine     10/2/23 [facility] ED       Patient Active Problem List:     Anxiety     Atopic dermatitis     Atopic rhinitis     Benign essential hypertension     Complicated varicose veins     Gastroesophageal reflux disease     Hyperlipidemia     Hypothyroidism     Low HDL (under 40)     Mass of left thigh     Mild intermittent asthma with acute exacerbation     Obesity (BMI 30-39. 9)     Osteochondroma of left fibula     Diabetes ([address])     Vitamin D deficiency     Vasovagal syncope     Micturition syncope     S/p dental crown     Orthostatic hypotension     Hypokalemia     Leukocytosis     Syncope and collapse     Past Medical History:   Diagnosis Date    Diabetes mellitus type 2, insulin dependent (HCC)     GERD (gastroesophageal reflux disease)     Hypothyroid       Past Surgical History:   Procedure Laterality Date     SECTION       SECTION      ENDOMETRIAL ABLATION      TUBAL LIGATION      UMBILICAL HERNIA REPAIR       Family History   Problem Relation Age of Onset    Ovarian Cancer Mother     Other Father         intracranial hemorrhage     Ovarian Cancer Maternal Grandmother     Stroke Maternal Grandmother     Diabetes Maternal Grandfather     Rheum Arthritis Paternal Grandmother     Diabetes Paternal Grandfather     High Blood Pressure Paternal Grandfather     Colon Cancer Paternal Grandfather     Colon Cancer Brother     Cancer Brother         AML     Current Outpatient Medications   Medication Sig Dispense Refill    lisinopril (PRINIVIL;ZESTRIL) 10 MG tablet Take 1 tablet by mouth daily 90 tablet 1    Icosapent Ethyl (VASCEPA) 1 g CAPS capsule TAKE TWO CAPSULES BY MOUTH TWICE A  capsule 1    ondansetron (ZOFRAN-ODT) 4 MG disintegrating tablet Take 1 tablet by mouth 3 times

## 2023-10-05 NOTE — PROGRESS NOTES
[facility]  [facility] - [address] [place] [address]  Dept: [phone]           Patient: Tonny Banuelos  Primary Care Physician: Marc Reddy MD   Chief Complaint   Patient presents with    Annual Exam     Last pap 22 WNL last mammogram 22-WNL     Menstrual Problem     Irregular cycles      HPI: Tonny Banuelos is a 47 y.o. [id number] who presents today for her annual women's wellness exam. 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 She has a history of ablation. Still having Menstrual cycles which are irregular. States she will occasionally Go 3 months without a period. Typically bleeds 3-4 days     OBSTETRICAL & GYNECOLOGICAL HISTORY:  OB History    Para Term  AB Living   2 2 2 0 1 2   SAB IAB Ectopic Molar Multiple Live Births   1 0 0 0 1 2      # Outcome Date GA Lbr Sid/2nd Weight Sex Delivery Anes PTL Lv   2 Term     M CS-LTranv   HUI   1A Term     F CS-LTranv   HUI   1B SAB         FD   Her periods are described above  She denies intolerable dysmenorrhea. But does get headache before periods. Patient's last menstrual period was 2023. Sexually Active: with   Dyspareunia: No  STD History: No  Birth Control: tubal ligation  History of abnormal pap smear: ascus     FAMILY HISTORY:  Family History of Breast, Ovarian, Colon or Uterine Cancer: Yes   family history includes Cancer in her brother; Colon Cancer in her brother and paternal grandfather; Diabetes in her maternal grandfather and paternal grandfather; High Blood Pressure in her paternal grandfather; Other in her father; Ovarian Cancer in her maternal grandmother and mother; Rheum Arthritis in her paternal grandmother; Stroke in her maternal grandmother. SOCIAL HISTORY:    reports that she has never smoked.  She has never used smokeless tobacco. She

## 2023-10-06 ENCOUNTER — OFFICE VISIT (OUTPATIENT)
Dept: OBGYN CLINIC | Age: 53
End: 2023-10-06

## 2023-10-06 VITALS
DIASTOLIC BLOOD PRESSURE: 82 MMHG | SYSTOLIC BLOOD PRESSURE: 134 MMHG | HEIGHT: 69 IN | BODY MASS INDEX: 33 KG/M2 | WEIGHT: 222.8 LBS

## 2023-10-06 DIAGNOSIS — Z12.31 ENCOUNTER FOR SCREENING MAMMOGRAM FOR BREAST CANCER: ICD-10-CM

## 2023-10-06 DIAGNOSIS — Z01.419 ENCOUNTER FOR GYNECOLOGICAL EXAMINATION: Primary | ICD-10-CM

## 2023-10-09 NOTE — TELEPHONE ENCOUNTER
Keysha Maxwell is calling to request a refill on the following medication(s):    Last Visit Date (If Applicable):  67/4/7930    Next Visit Date:    Visit date not found    Medication Request:  Requested Prescriptions     Pending Prescriptions Disp Refills    insulin glargine (LANTUS SOLOSTAR) 100 UNIT/ML injection pen [Pharmacy Med Name: Coral Evington 100UNIT/ML (15ML=1BOX)] 75 mL 1     Sig: INJECT 55-75 UNITS UNDER THE SKIN DAILY AS DIRECTED

## 2023-10-10 RX ORDER — INSULIN GLARGINE 100 [IU]/ML
INJECTION, SOLUTION SUBCUTANEOUS
Qty: 75 ML | Refills: 1 | Status: SHIPPED | OUTPATIENT
Start: 2023-10-10

## 2023-10-16 NOTE — TELEPHONE ENCOUNTER
Chaparrita message not read by patient. Letter mailed.     For Pharmacy Admin Tracking Only    Program: Luda in place:  No  Gap Closed?: No   Time Spent (min): 5

## 2023-11-14 ENCOUNTER — TELEPHONE (OUTPATIENT)
Dept: PHARMACY | Age: 53
End: 2023-11-14

## 2023-11-14 NOTE — TELEPHONE ENCOUNTER
Patient was a no call/no show for Diabetic appointment today.  Called and left voice mail to reschedule.

## 2023-12-08 ENCOUNTER — TELEPHONE (OUTPATIENT)
Dept: PHARMACY | Facility: CLINIC | Age: 53
End: 2023-12-08

## 2023-12-08 RX ORDER — SEMAGLUTIDE 1.34 MG/ML
INJECTION, SOLUTION SUBCUTANEOUS
Qty: 3 ML | Refills: 0 | Status: SHIPPED | OUTPATIENT
Start: 2023-12-08

## 2023-12-08 NOTE — TELEPHONE ENCOUNTER
Cali Willis is calling to request a refill on the following medication(s):    Last Visit Date (If Applicable):  56/7/3669    Next Visit Date:    Visit date not found    Medication Request:  Requested Prescriptions     Pending Prescriptions Disp Refills    OZEMPIC, 1 MG/DOSE, 4 MG/3ML SOPN [Pharmacy Med Name: Katiuska Pedro (1MG/DOSE) 4MG/3ML PENS] 3 mL 0     Sig: INJECT 1MG UNDER THE SKIN ONCE WEEKLY

## 2023-12-08 NOTE — TELEPHONE ENCOUNTER
Patient is currently enrolled in the Be Well with Diabetes Program.   Our records indicate that we are missing documentation of the requirement(s) listed below. If these requirement(s) are not completed by December 31, 2023, the patient may be disenrolled from the program:     Urine Microalbumin  Lipid panel    Attempt made to contact patient at home number. Spoke to patient and reminded them to complete the above requirement(s) by 12/31/23 to avoid possible discharge from the DM Program.  Patient verified understanding. Patient states her employment has ended on 11/7/23. Informed her she would be disenrolled. Routed to  for disenrollment. Germaine Pang [facility].   [facility]   Department, toll free: [phone] Option #3    For Pharmacy Admin Tracking Only    Program: Luda in place:  No  Time Spent (min): 5

## 2023-12-11 NOTE — TELEPHONE ENCOUNTER
Noted.  Patient will be discharged from the [facility] DM Program.

## 2023-12-27 RX ORDER — OMEPRAZOLE 20 MG/1
CAPSULE, DELAYED RELEASE ORAL
Qty: 90 CAPSULE | Refills: 1 | Status: SHIPPED | OUTPATIENT
Start: 2023-12-27

## 2023-12-27 RX ORDER — BLOOD-GLUCOSE METER
KIT MISCELLANEOUS
Qty: 200 EACH | Refills: 3 | Status: SHIPPED | OUTPATIENT
Start: 2023-12-27

## 2023-12-27 NOTE — TELEPHONE ENCOUNTER
Ariana Leach is calling to request a refill on the following medication(s):    Medication Request:  Requested Prescriptions     Pending Prescriptions Disp Refills    Insulin Pen Needle (TRUEPLUS PEN NEEDLES) 29G X 12MM MISC [Pharmacy Med Name: LEADER PEN NEEDLE 29G X 12MM (1/2\")] 200 each 3     Sig: USE TO INJECT INSULIN 5 TIMES DAILY    omeprazole (PRILOSEC) 20 MG delayed release capsule [Pharmacy Med Name: OMEPRAZOLE 20MG CPDR] 90 capsule 1     Sig: TAKE ONE CAPSULE BY MOUTH ONCE A DAY       Last Visit Date (If Applicable):  31/9/1692    Next Visit Date:    Visit date not found

## 2024-01-09 ENCOUNTER — CLINICAL DOCUMENTATION (OUTPATIENT)
Dept: PHARMACY | Facility: CLINIC | Age: 54
End: 2024-01-09

## 2024-01-09 NOTE — PROGRESS NOTES
Pharmacy Pop Care Documentation:     Dang Downey is being removed from the diabetes management program for the following reason(s):  DC from DM Program: Loss of Benefits: [facility]    Paige Platt    For Pharmacy Admin Tracking Only    Program: [facility]  CPA in place:  No  Gap Closed?: Yes   Time Spent (min): 5

## 2024-01-30 ENCOUNTER — HOSPITAL ENCOUNTER (OUTPATIENT)
Age: 54
Setting detail: SPECIMEN
Discharge: HOME | End: 2024-01-30

## 2024-01-30 ENCOUNTER — OFFICE VISIT (OUTPATIENT)
Dept: FAMILY MEDICINE CLINIC | Age: 54
End: 2024-01-30
Payer: COMMERCIAL

## 2024-01-30 VITALS
SYSTOLIC BLOOD PRESSURE: 138 MMHG | WEIGHT: 235.4 LBS | BODY MASS INDEX: 34.87 KG/M2 | HEIGHT: 69 IN | DIASTOLIC BLOOD PRESSURE: 88 MMHG | OXYGEN SATURATION: 98 % | HEART RATE: 71 BPM | TEMPERATURE: 97.2 F

## 2024-01-30 DIAGNOSIS — Z13.220 SCREENING FOR HYPERLIPIDEMIA: ICD-10-CM

## 2024-01-30 DIAGNOSIS — Z79.4 TYPE 2 DIABETES MELLITUS WITHOUT COMPLICATION, WITH LONG-TERM CURRENT USE OF INSULIN (HCC): ICD-10-CM

## 2024-01-30 DIAGNOSIS — I10 BENIGN ESSENTIAL HYPERTENSION: ICD-10-CM

## 2024-01-30 DIAGNOSIS — R25.1 TREMOR: ICD-10-CM

## 2024-01-30 DIAGNOSIS — Z23 NEED FOR PROPHYLACTIC VACCINATION AND INOCULATION AGAINST VARICELLA: ICD-10-CM

## 2024-01-30 DIAGNOSIS — E78.49 OTHER HYPERLIPIDEMIA: ICD-10-CM

## 2024-01-30 DIAGNOSIS — Z00.01 ENCOUNTER FOR WELL ADULT EXAM WITH ABNORMAL FINDINGS: Primary | ICD-10-CM

## 2024-01-30 DIAGNOSIS — E11.9 TYPE 2 DIABETES MELLITUS WITHOUT COMPLICATION, WITH LONG-TERM CURRENT USE OF INSULIN (HCC): ICD-10-CM

## 2024-01-30 DIAGNOSIS — E03.9 ACQUIRED HYPOTHYROIDISM: ICD-10-CM

## 2024-01-30 LAB
ALBUMIN SERPL-MCNC: 4.2 G/DL (ref 3.5–5.2)
ALBUMIN/GLOB SERPL: 2 {RATIO} (ref 1–2.5)
ALP SERPL-CCNC: 92 U/L (ref 35–104)
ALT SERPL-CCNC: 27 U/L (ref 10–35)
ANION GAP SERPL CALCULATED.3IONS-SCNC: 2 MMOL/L (ref 9–16)
AST SERPL-CCNC: 22 U/L (ref 10–35)
BASOPHILS # BLD: 0.04 K/UL (ref 0–0.2)
BASOPHILS NFR BLD: 1 % (ref 0–2)
BILIRUB SERPL-MCNC: 0.3 MG/DL (ref 0–1.2)
BUN SERPL-MCNC: 11 MG/DL (ref 6–20)
CALCIUM SERPL-MCNC: 9.4 MG/DL (ref 8.6–10.4)
CHLORIDE SERPL-SCNC: 99 MMOL/L (ref 98–107)
CHOLEST SERPL-MCNC: 156 MG/DL (ref 0–199)
CHOLESTEROL/HDL RATIO: 5
CO2 SERPL-SCNC: 36 MMOL/L (ref 20–31)
CREAT SERPL-MCNC: 0.7 MG/DL (ref 0.5–0.9)
CREAT UR-MCNC: 137 MG/DL (ref 28–217)
EOSINOPHIL # BLD: 0.24 K/UL (ref 0–0.44)
EOSINOPHILS RELATIVE PERCENT: 4 % (ref 1–4)
ERYTHROCYTE [DISTWIDTH] IN BLOOD BY AUTOMATED COUNT: 12.7 % (ref 11.8–14.4)
GFR SERPL CREATININE-BSD FRML MDRD: >60 ML/MIN/1.73M2
GLUCOSE SERPL-MCNC: 297 MG/DL (ref 74–99)
HBA1C MFR BLD: 9.5 %
HCT VFR BLD AUTO: 38 % (ref 36.3–47.1)
HDLC SERPL-MCNC: 31 MG/DL
HGB BLD-MCNC: 12.7 G/DL (ref 11.9–15.1)
IMM GRANULOCYTES # BLD AUTO: 0.03 K/UL (ref 0–0.3)
IMM GRANULOCYTES NFR BLD: 1 %
LDLC SERPL CALC-MCNC: ABNORMAL MG/DL (ref 0–100)
LDLC SERPL DIRECT ASSAY-MCNC: 74 MG/DL
LYMPHOCYTES NFR BLD: 1.53 K/UL (ref 1.1–3.7)
LYMPHOCYTES RELATIVE PERCENT: 24 % (ref 24–43)
MCH RBC QN AUTO: 27.6 PG (ref 25.2–33.5)
MCHC RBC AUTO-ENTMCNC: 33.4 G/DL (ref 28.4–34.8)
MCV RBC AUTO: 82.6 FL (ref 82.6–102.9)
MICROALBUMIN UR-MCNC: 2280 MG/L (ref 0–20)
MICROALBUMIN/CREAT UR-RTO: 1664 MCG/MG CREAT (ref 0–25)
MONOCYTES NFR BLD: 0.49 K/UL (ref 0.1–1.2)
MONOCYTES NFR BLD: 8 % (ref 3–12)
NEUTROPHILS NFR BLD: 62 % (ref 36–65)
NEUTS SEG NFR BLD: 4.08 K/UL (ref 1.5–8.1)
NRBC BLD-RTO: 0 PER 100 WBC
PLATELET # BLD AUTO: 279 K/UL (ref 138–453)
PMV BLD AUTO: 9.6 FL (ref 8.1–13.5)
POTASSIUM SERPL-SCNC: 4.1 MMOL/L (ref 3.7–5.3)
PROT SERPL-MCNC: 6.8 G/DL (ref 6.6–8.7)
RBC # BLD AUTO: 4.6 M/UL (ref 3.95–5.11)
SODIUM SERPL-SCNC: 137 MMOL/L (ref 136–145)
TRIGL SERPL-MCNC: 434 MG/DL
TSH SERPL DL<=0.05 MIU/L-ACNC: 2.28 UIU/ML (ref 0.27–4.2)
VLDLC SERPL CALC-MCNC: ABNORMAL MG/DL
WBC OTHER # BLD: 6.4 K/UL (ref 3.5–11.3)

## 2024-01-30 PROCEDURE — 3017F COLORECTAL CA SCREEN DOC REV: CPT | Performed by: FAMILY MEDICINE

## 2024-01-30 PROCEDURE — 3079F DIAST BP 80-89 MM HG: CPT | Performed by: FAMILY MEDICINE

## 2024-01-30 PROCEDURE — G8417 CALC BMI ABV UP PARAM F/U: HCPCS | Performed by: FAMILY MEDICINE

## 2024-01-30 PROCEDURE — 99396 PREV VISIT EST AGE 40-64: CPT | Performed by: FAMILY MEDICINE

## 2024-01-30 PROCEDURE — 2022F DILAT RTA XM EVC RTNOPTHY: CPT | Performed by: FAMILY MEDICINE

## 2024-01-30 PROCEDURE — 3075F SYST BP GE 130 - 139MM HG: CPT | Performed by: FAMILY MEDICINE

## 2024-01-30 PROCEDURE — G8427 DOCREV CUR MEDS BY ELIG CLIN: HCPCS | Performed by: FAMILY MEDICINE

## 2024-01-30 PROCEDURE — G8482 FLU IMMUNIZE ORDER/ADMIN: HCPCS | Performed by: FAMILY MEDICINE

## 2024-01-30 PROCEDURE — 1036F TOBACCO NON-USER: CPT | Performed by: FAMILY MEDICINE

## 2024-01-30 PROCEDURE — 99214 OFFICE O/P EST MOD 30 MIN: CPT | Performed by: FAMILY MEDICINE

## 2024-01-30 PROCEDURE — 3046F HEMOGLOBIN A1C LEVEL >9.0%: CPT | Performed by: FAMILY MEDICINE

## 2024-01-30 PROCEDURE — 83036 HEMOGLOBIN GLYCOSYLATED A1C: CPT | Performed by: FAMILY MEDICINE

## 2024-01-30 RX ORDER — GEMFIBROZIL 600 MG/1
600 TABLET, FILM COATED ORAL 2 TIMES DAILY
Qty: 180 TABLET | Refills: 5 | Status: SHIPPED | OUTPATIENT
Start: 2024-01-30

## 2024-01-30 RX ORDER — SEMAGLUTIDE 1.34 MG/ML
INJECTION, SOLUTION SUBCUTANEOUS
Qty: 3 ML | Refills: 3 | Status: SHIPPED | OUTPATIENT
Start: 2024-01-30

## 2024-01-30 RX ORDER — ZOSTER VACCINE RECOMBINANT, ADJUVANTED 50 MCG/0.5
0.5 KIT INTRAMUSCULAR ONCE
Qty: 1 EACH | Refills: 1 | Status: SHIPPED | OUTPATIENT
Start: 2024-01-30 | End: 2024-01-30

## 2024-01-30 RX ORDER — INSULIN GLARGINE 100 [IU]/ML
INJECTION, SOLUTION SUBCUTANEOUS
Qty: 75 ML | Refills: 1 | Status: SHIPPED | OUTPATIENT
Start: 2024-01-30

## 2024-01-30 RX ORDER — OMEPRAZOLE 20 MG/1
CAPSULE, DELAYED RELEASE ORAL
Qty: 90 CAPSULE | Refills: 1 | Status: SHIPPED | OUTPATIENT
Start: 2024-01-30

## 2024-01-30 RX ORDER — PROCHLORPERAZINE 25 MG/1
SUPPOSITORY RECTAL
Qty: 1 EACH | Refills: 3 | Status: SHIPPED | OUTPATIENT
Start: 2024-01-30

## 2024-01-30 RX ORDER — ATORVASTATIN CALCIUM 20 MG/1
20 TABLET, FILM COATED ORAL DAILY
Qty: 90 TABLET | Refills: 5 | Status: SHIPPED | OUTPATIENT
Start: 2024-01-30

## 2024-01-30 RX ORDER — ALBUTEROL SULFATE 90 UG/1
2 AEROSOL, METERED RESPIRATORY (INHALATION) EVERY 6 HOURS PRN
Qty: 18 G | Refills: 3 | Status: SHIPPED | OUTPATIENT
Start: 2024-01-30

## 2024-01-30 RX ORDER — ICOSAPENT ETHYL 1000 MG/1
CAPSULE ORAL
Qty: 360 CAPSULE | Refills: 1 | Status: SHIPPED | OUTPATIENT
Start: 2024-01-30

## 2024-01-30 RX ORDER — PROCHLORPERAZINE 25 MG/1
SUPPOSITORY RECTAL
Qty: 9 EACH | Refills: 3 | Status: SHIPPED | OUTPATIENT
Start: 2024-01-30

## 2024-01-30 RX ORDER — LEVOTHYROXINE SODIUM 88 UG/1
88 TABLET ORAL DAILY
Qty: 90 TABLET | Refills: 5 | Status: SHIPPED | OUTPATIENT
Start: 2024-01-30

## 2024-01-30 RX ORDER — HYDROCHLOROTHIAZIDE 12.5 MG/1
12.5 CAPSULE, GELATIN COATED ORAL EVERY MORNING
Qty: 90 CAPSULE | Refills: 1 | Status: SHIPPED | OUTPATIENT
Start: 2024-01-30

## 2024-01-30 RX ORDER — BLOOD-GLUCOSE METER
KIT MISCELLANEOUS
Qty: 200 EACH | Refills: 3 | Status: SHIPPED | OUTPATIENT
Start: 2024-01-30

## 2024-01-30 RX ORDER — INSULIN LISPRO 100 [IU]/ML
10 INJECTION, SOLUTION INTRAVENOUS; SUBCUTANEOUS
Qty: 15 ADJUSTABLE DOSE PRE-FILLED PEN SYRINGE | Refills: 1 | Status: SHIPPED | OUTPATIENT
Start: 2024-01-30

## 2024-01-30 RX ORDER — PROCHLORPERAZINE 25 MG/1
SUPPOSITORY RECTAL
Qty: 9 EACH | Refills: 3 | Status: CANCELLED | OUTPATIENT
Start: 2024-01-30

## 2024-01-30 RX ORDER — ASPIRIN 81 MG/1
81 TABLET ORAL DAILY
Qty: 90 TABLET | Refills: 1 | Status: SHIPPED | OUTPATIENT
Start: 2024-01-30

## 2024-01-30 RX ORDER — LISINOPRIL 10 MG/1
10 TABLET ORAL DAILY
Qty: 90 TABLET | Refills: 1 | Status: SHIPPED | OUTPATIENT
Start: 2024-01-30

## 2024-01-30 ASSESSMENT — PATIENT HEALTH QUESTIONNAIRE - PHQ9
2. FEELING DOWN, DEPRESSED OR HOPELESS: 0
SUM OF ALL RESPONSES TO PHQ QUESTIONS 1-9: 0
SUM OF ALL RESPONSES TO PHQ9 QUESTIONS 1 & 2: 0
SUM OF ALL RESPONSES TO PHQ QUESTIONS 1-9: 0
1. LITTLE INTEREST OR PLEASURE IN DOING THINGS: 0

## 2024-01-30 NOTE — PROGRESS NOTES
03/08/1992    Pneumococcal 0-64 years Vaccine (2 - PCV) 11/20/2016    Shingles vaccine (1 of 2) Never done    Diabetic foot exam  09/29/2021    Lipids  09/06/2023    Diabetic Alb to Cr ratio (uACR) test  12/22/2023    A1C test (Diabetic or Prediabetic)  04/30/2024    Breast cancer screen  09/28/2024    GFR test (Diabetes, CKD 3-4, OR last GFR 15-59)  10/02/2024    Depression Screen  10/05/2024    Colorectal Cancer Screen  01/07/2025    Cervical cancer screen  09/17/2026    DTaP/Tdap/Td vaccine (4 - Td or Tdap) 02/26/2030    Flu vaccine  Completed    COVID-19 Vaccine  Completed    Hepatitis A vaccine  Aged Out    Hib vaccine  Aged Out    Polio vaccine  Aged Out    Meningococcal (ACWY) vaccine  Aged Out    Hepatitis C screen  Discontinued    HIV screen  Discontinued     Recommendations for Preventive Services Due: see orders and patient instructions/AVS.    Return in about 3 months (around 4/30/2024), or if symptoms worsen or fail to improve, for DM II.    (Please note that portions of this note were completed with a voice recognition program. Efforts were made to edit the dictations but occasionally words are mis-transcribed.)

## 2024-02-01 ENCOUNTER — PATIENT MESSAGE (OUTPATIENT)
Dept: FAMILY MEDICINE CLINIC | Age: 54
End: 2024-02-01

## 2024-02-01 DIAGNOSIS — Z79.4 TYPE 2 DIABETES MELLITUS WITHOUT COMPLICATION, WITH LONG-TERM CURRENT USE OF INSULIN (HCC): Primary | ICD-10-CM

## 2024-02-01 DIAGNOSIS — Z79.4 TYPE 2 DIABETES MELLITUS WITHOUT COMPLICATION, WITH LONG-TERM CURRENT USE OF INSULIN (HCC): ICD-10-CM

## 2024-02-01 DIAGNOSIS — E11.9 TYPE 2 DIABETES MELLITUS WITHOUT COMPLICATION, WITH LONG-TERM CURRENT USE OF INSULIN (HCC): ICD-10-CM

## 2024-02-01 DIAGNOSIS — E11.9 TYPE 2 DIABETES MELLITUS WITHOUT COMPLICATION, WITH LONG-TERM CURRENT USE OF INSULIN (HCC): Primary | ICD-10-CM

## 2024-02-01 RX ORDER — BLOOD-GLUCOSE METER
EACH MISCELLANEOUS
Qty: 1 KIT | Refills: 0 | Status: SHIPPED | OUTPATIENT
Start: 2024-02-01

## 2024-02-01 NOTE — TELEPHONE ENCOUNTER
From: Dang Downey  To: Dr. Millicent Cortez  Sent: 2/1/2024 4:54 PM EST  Subject: Glucose monitor    Can you order me a daily glucose sita with strips, lancets. It is too expensive for Dexicom with new insurance.

## 2024-02-02 ENCOUNTER — HOSPITAL ENCOUNTER (OUTPATIENT)
Age: 54
Setting detail: SPECIMEN
Discharge: HOME | End: 2024-02-02

## 2024-02-02 DIAGNOSIS — E11.9 TYPE 2 DIABETES MELLITUS WITHOUT COMPLICATION, WITH LONG-TERM CURRENT USE OF INSULIN (HCC): ICD-10-CM

## 2024-02-02 DIAGNOSIS — Z79.4 TYPE 2 DIABETES MELLITUS WITHOUT COMPLICATION, WITH LONG-TERM CURRENT USE OF INSULIN (HCC): ICD-10-CM

## 2024-02-02 LAB
CREAT UR-MCNC: 126.1 MG/DL (ref 28–217)
MICROALBUMIN UR-MCNC: 1631 MG/L
MICROALBUMIN/CREAT UR-RTO: 1293 MCG/MG CREAT

## 2024-02-07 ENCOUNTER — TELEPHONE (OUTPATIENT)
Dept: FAMILY MEDICINE CLINIC | Age: 54
End: 2024-02-07

## 2024-02-07 RX ORDER — BLOOD-GLUCOSE METER
1 EACH MISCELLANEOUS DAILY
Qty: 1 KIT | Refills: 0 | Status: SHIPPED | OUTPATIENT
Start: 2024-02-07

## 2024-02-07 NOTE — TELEPHONE ENCOUNTER
[employer] called they have been trying to reach patient to inform them that the PRODIGY AUTO CODE BLOOD GLUCOSE  is not covered by insurance and is wanting the provider to send in a different kind that is covered. Listed below are the ones covered. Please advise.       One touch, ultra 2, flex, reflex, control next, accuchex, guide me.

## 2024-02-08 RX ORDER — LANCETS 30 GAUGE
1 EACH MISCELLANEOUS 2 TIMES DAILY
Qty: 300 EACH | Refills: 1 | Status: SHIPPED | OUTPATIENT
Start: 2024-02-08

## 2024-02-08 RX ORDER — GLUCOSAMINE HCL/CHONDROITIN SU 500-400 MG
CAPSULE ORAL
Qty: 100 STRIP | Refills: 3 | Status: SHIPPED | OUTPATIENT
Start: 2024-02-08

## 2024-02-08 NOTE — TELEPHONE ENCOUNTER
Please send over lancets and test strips for one touch ultra 2. Send correct gauage size per pharmacy. Send to [employer]

## 2024-02-14 ENCOUNTER — TELEPHONE (OUTPATIENT)
Dept: FAMILY MEDICINE CLINIC | Age: 54
End: 2024-02-14

## 2024-02-14 RX ORDER — GLUCOSAMINE HCL/CHONDROITIN SU 500-400 MG
CAPSULE ORAL
Qty: 100 STRIP | Refills: 3 | Status: SHIPPED | OUTPATIENT
Start: 2024-02-14

## 2024-02-14 RX ORDER — LANCETS 30 GAUGE
1 EACH MISCELLANEOUS 3 TIMES DAILY
Qty: 100 EACH | Refills: 1 | Status: SHIPPED | OUTPATIENT
Start: 2024-02-14

## 2024-02-14 NOTE — TELEPHONE ENCOUNTER
[employer] called needing clarification for the diabetic supplies, the monitor strips and lancets need to match directions and quantity. Please advise.     [phone]  Reference number   [id number]

## 2024-03-06 DIAGNOSIS — Z79.4 TYPE 2 DIABETES MELLITUS WITHOUT COMPLICATION, WITH LONG-TERM CURRENT USE OF INSULIN (HCC): ICD-10-CM

## 2024-03-06 DIAGNOSIS — E11.9 TYPE 2 DIABETES MELLITUS WITHOUT COMPLICATION, WITH LONG-TERM CURRENT USE OF INSULIN (HCC): ICD-10-CM

## 2024-03-06 RX ORDER — HYDROCHLOROTHIAZIDE 12.5 MG/1
12.5 CAPSULE, GELATIN COATED ORAL EVERY MORNING
Qty: 90 CAPSULE | Refills: 1 | Status: SHIPPED | OUTPATIENT
Start: 2024-03-06

## 2024-03-06 RX ORDER — ASPIRIN 81 MG/1
81 TABLET ORAL DAILY
Qty: 90 TABLET | Refills: 1 | Status: SHIPPED | OUTPATIENT
Start: 2024-03-06

## 2024-03-06 RX ORDER — GEMFIBROZIL 600 MG/1
600 TABLET, FILM COATED ORAL 2 TIMES DAILY
Qty: 180 TABLET | Refills: 5 | Status: SHIPPED | OUTPATIENT
Start: 2024-03-06

## 2024-03-06 RX ORDER — ATORVASTATIN CALCIUM 20 MG/1
20 TABLET, FILM COATED ORAL DAILY
Qty: 90 TABLET | Refills: 5 | Status: SHIPPED | OUTPATIENT
Start: 2024-03-06

## 2024-03-06 RX ORDER — SEMAGLUTIDE 1.34 MG/ML
INJECTION, SOLUTION SUBCUTANEOUS
Qty: 3 ML | Refills: 3 | Status: SHIPPED | OUTPATIENT
Start: 2024-03-06

## 2024-03-06 RX ORDER — GLUCOSAMINE HCL/CHONDROITIN SU 500-400 MG
CAPSULE ORAL
Qty: 100 STRIP | Refills: 3 | Status: SHIPPED | OUTPATIENT
Start: 2024-03-06

## 2024-03-06 RX ORDER — LISINOPRIL 10 MG/1
10 TABLET ORAL DAILY
Qty: 90 TABLET | Refills: 1 | Status: SHIPPED | OUTPATIENT
Start: 2024-03-06

## 2024-03-06 RX ORDER — LEVOTHYROXINE SODIUM 88 UG/1
88 TABLET ORAL DAILY
Qty: 90 TABLET | Refills: 5 | Status: SHIPPED | OUTPATIENT
Start: 2024-03-06

## 2024-03-06 RX ORDER — OMEPRAZOLE 20 MG/1
CAPSULE, DELAYED RELEASE ORAL
Qty: 90 CAPSULE | Refills: 1 | Status: SHIPPED | OUTPATIENT
Start: 2024-03-06

## 2024-03-06 NOTE — TELEPHONE ENCOUNTER
Dang Downey is calling to request a refill on the following medication(s):    Last Visit Date (If Applicable):  1/30/2024    Next Visit Date:    4/30/2024    Medication Request:  Requested Prescriptions     Pending Prescriptions Disp Refills    Semaglutide, 1 MG/DOSE, (OZEMPIC, 1 MG/DOSE,) 4 MG/3ML SOPN 3 mL 3     Sig: INJECT 1MG UNDER THE SKIN ONCE WEEKLY    levothyroxine (SYNTHROID) 88 MCG tablet 90 tablet 5     Sig: Take 1 tablet by mouth daily    aspirin (ASPIRIN LOW DOSE) 81 MG EC tablet 90 tablet 1     Sig: Take 1 tablet by mouth daily    gemfibrozil (LOPID) 600 MG tablet 180 tablet 5     Sig: Take 1 tablet by mouth 2 times daily    hydroCHLOROthiazide 12.5 MG capsule 90 capsule 1     Sig: Take 1 capsule by mouth every morning    omeprazole (PRILOSEC) 20 MG delayed release capsule 90 capsule 1     Sig: TAKE ONE CAPSULE BY MOUTH ONCE A DAY    metFORMIN (GLUCOPHAGE) 1000 MG tablet 180 tablet 5     Sig: Take 1 tablet by mouth 2 times daily (with meals)    lisinopril (PRINIVIL;ZESTRIL) 10 MG tablet 90 tablet 1     Sig: Take 1 tablet by mouth daily    atorvastatin (LIPITOR) 20 MG tablet 90 tablet 5     Sig: Take 1 tablet by mouth daily    blood glucose monitor strips 100 strip 3     Sig: Test 3 times a day & as needed for symptoms of irregular blood glucose. Dispense sufficient amount for indicated testing frequency plus additional to accommodate PRN testing needs.

## 2024-04-02 RX ORDER — METRONIDAZOLE 500 MG/1
500 TABLET ORAL 2 TIMES DAILY
Qty: 14 TABLET | Refills: 0 | Status: SHIPPED | OUTPATIENT
Start: 2024-04-02 | End: 2024-04-02

## 2024-04-02 RX ORDER — METRONIDAZOLE 500 MG/1
500 TABLET ORAL 2 TIMES DAILY
Qty: 14 TABLET | Refills: 0 | Status: SHIPPED | OUTPATIENT
Start: 2024-04-02 | End: 2024-04-09

## 2024-04-04 DIAGNOSIS — N89.8 VAGINAL ITCHING: ICD-10-CM

## 2024-04-04 DIAGNOSIS — N92.1 MENORRHAGIA WITH IRREGULAR CYCLE: Primary | ICD-10-CM

## 2024-04-04 RX ORDER — FLUCONAZOLE 150 MG/1
150 TABLET ORAL ONCE
Qty: 1 TABLET | Refills: 0 | Status: SHIPPED | OUTPATIENT
Start: 2024-04-04 | End: 2024-04-04

## 2024-04-12 DIAGNOSIS — N89.8 VAGINAL ITCHING: ICD-10-CM

## 2024-04-12 RX ORDER — FLUCONAZOLE 150 MG/1
150 TABLET ORAL ONCE
Qty: 1 TABLET | Refills: 0 | Status: SHIPPED | OUTPATIENT
Start: 2024-04-12 | End: 2024-04-12

## 2024-05-03 ENCOUNTER — HOSPITAL ENCOUNTER (OUTPATIENT)
Age: 54
Setting detail: SPECIMEN
Discharge: HOME | End: 2024-05-03

## 2024-05-03 ENCOUNTER — PROCEDURE VISIT (OUTPATIENT)
Dept: OBGYN CLINIC | Age: 54
End: 2024-05-03
Payer: COMMERCIAL

## 2024-05-03 VITALS
DIASTOLIC BLOOD PRESSURE: 86 MMHG | HEIGHT: 69 IN | WEIGHT: 226.4 LBS | SYSTOLIC BLOOD PRESSURE: 135 MMHG | BODY MASS INDEX: 33.53 KG/M2 | HEART RATE: 71 BPM

## 2024-05-03 DIAGNOSIS — D25.2 INTRAMURAL AND SUBSEROUS LEIOMYOMA OF UTERUS: ICD-10-CM

## 2024-05-03 DIAGNOSIS — N93.9 ABNORMAL UTERINE BLEEDING: Primary | ICD-10-CM

## 2024-05-03 DIAGNOSIS — Z98.51 HISTORY OF BILATERAL TUBAL LIGATION: ICD-10-CM

## 2024-05-03 DIAGNOSIS — N93.9 ABNORMAL UTERINE BLEEDING: ICD-10-CM

## 2024-05-03 DIAGNOSIS — Z98.890 HISTORY OF ENDOMETRIAL ABLATION: ICD-10-CM

## 2024-05-03 DIAGNOSIS — R32 INCONTINENCE IN FEMALE: ICD-10-CM

## 2024-05-03 DIAGNOSIS — D25.1 INTRAMURAL AND SUBSEROUS LEIOMYOMA OF UTERUS: ICD-10-CM

## 2024-05-03 LAB
BILIRUB UR QL STRIP: NEGATIVE
CANDIDA SPECIES: NEGATIVE
CASTS #/AREA URNS LPF: NORMAL /LPF (ref 0–2)
CASTS #/AREA URNS LPF: NORMAL /LPF (ref 0–2)
CLARITY UR: ABNORMAL
COLOR UR: YELLOW
EPI CELLS #/AREA URNS HPF: NORMAL /HPF (ref 0–5)
GARDNERELLA VAGINALIS: POSITIVE
GLUCOSE UR STRIP-MCNC: ABNORMAL MG/DL
HGB UR QL STRIP.AUTO: ABNORMAL
KETONES UR STRIP-MCNC: NEGATIVE MG/DL
LEUKOCYTE ESTERASE UR QL STRIP: ABNORMAL
MUCOUS THREADS URNS QL MICRO: NORMAL
NITRITE UR QL STRIP: NEGATIVE
PH UR STRIP: 5.5 [PH] (ref 5–8)
PROT UR STRIP-MCNC: ABNORMAL MG/DL
RBC #/AREA URNS HPF: NORMAL /HPF (ref 0–2)
SOURCE: ABNORMAL
SP GR UR STRIP: 1.02 (ref 1–1.03)
TRICHOMONAS: NEGATIVE
UROBILINOGEN UR STRIP-ACNC: NORMAL EU/DL (ref 0–1)
WBC #/AREA URNS HPF: NORMAL /HPF (ref 0–5)

## 2024-05-03 PROCEDURE — 58100 BIOPSY OF UTERUS LINING: CPT

## 2024-05-03 PROCEDURE — 99213 OFFICE O/P EST LOW 20 MIN: CPT

## 2024-05-03 NOTE — PROGRESS NOTES
[facility]  [address]  [address]  [facility] [address]  Dept: [phone]           Gynecologic problem visit    Patient: Dang Downey  Primary Care Physician: Dang Zamorano, APRN - CNP   Chief Complaint   Patient presents with    Procedure     EMB     HPI: Dang Downey is a 54 y.o.  who presents today as a returning patient for follow up of vaginal irritation and abnormal uterine bleeding. I recently sent in flagyl and diflucan to treat suspected vaginitis and yeast. She is not yet menopausal but had gone 6 months without a period until early April and has been bleeding intermittently ever since. Flow has varried.     She also reports Drastic new onset incontinence of urine. Her smell is limited but her  states her urine has a  strong odor.     Ultrasound performed in the office today. She is also here to check for infection and complete an EMB. Ultrasound was reassuring but she would like to proceed with EMB regardless.     US PELVIS COMPLETE NON OB TRANSABDOMINAL AND TRANSVAGINAL     UTERUS: 6.4 x 4.9 x 3.3 cm  Diffusely heterogenous  Intramural fibroid seen w/in the rt ut = 1.3 x 1.2 x 1.6 cm  Subserosal fibroid seen w/in the posterior ut = 3.5 x 2.3 x 2.5 cm     ENDO: 0.19 cm     RT. OVARY: 3.3 x 3.5 x 2.8 cm  Dominant follicle seen = 2.4 x 2.5 x 2.4 cm     LT. OVARY: not seen at this time     no pelvic free fluid seen       OBSTETRICAL & GYNECOLOGICAL HISTORY:  OB History    Para Term  AB Living   2 2 2 0 1 2   SAB IAB Ectopic Molar Multiple Live Births   1 0 0 0 1 2      # Outcome Date GA Lbr Sid/2nd Weight Sex Delivery Anes PTL Lv   2 Term     M CS-LTranv   HUI   1A Term     F CS-LTranv   HUI   1B SAB         FD     No LMP recorded.    FAMILY HISTORY:  family history includes Cancer in her brother; Colon Cancer in her brother and paternal grandfather; Diabetes in her 
results of biopsy   Return in about 6 weeks (around 6/14/2024) for follow up physician CAMRON.    Patient Active Problem List    Diagnosis Date Noted    Micturition syncope 02/04/2023     Priority: Medium    S/p dental crown 02/04/2023     Priority: Medium    Orthostatic hypotension 02/04/2023     Priority: Medium    Hypokalemia 02/04/2023     Priority: Medium    Leukocytosis 02/04/2023     Priority: Medium    Syncope and collapse 02/04/2023     Priority: Medium    Vasovagal syncope 02/03/2023     Priority: Medium    History of endometrial ablation 05/15/2024    Intramural and subserous leiomyoma of uterus 05/15/2024    Anxiety 05/22/2019    Obesity (BMI 30-39.9) 05/22/2019    Mild intermittent asthma with acute exacerbation 10/05/2018    Low HDL (under 40) 02/13/2017    Vitamin D deficiency 02/13/2017    Complicated varicose veins 02/08/2017    Mass of left thigh 02/08/2017    Osteochondroma of left fibula 10/24/2016    Atopic dermatitis 08/10/2016    Atopic rhinitis 08/10/2016    Gastroesophageal reflux disease 08/10/2016    Benign essential hypertension 08/09/2016    Hyperlipidemia 08/09/2016    Hypothyroidism 08/09/2016    Diabetes (HCC) 08/09/2016        Counseling  The patient was counseled on follow up and home care with restrictions noted. She was instructed to use barrier protection for sexually transmitted disease prevention. She is to notify the office or go to the nearest Emergency Department if she experiences Abdominal Pain, Temperatures more than 100.4 F, Odiferous Vaginal Discharge, Dizziness or Shortness of breath.    DEVI Randolph - CNP  [facility]   5/15/2024, 6:08 AM

## 2024-05-04 LAB
MICROORGANISM SPEC CULT: NORMAL
SPECIMEN DESCRIPTION: NORMAL

## 2024-05-07 LAB — SURGICAL PATHOLOGY REPORT: NORMAL

## 2024-05-07 RX ORDER — METRONIDAZOLE 500 MG/1
500 TABLET ORAL 2 TIMES DAILY
Qty: 14 TABLET | Refills: 0 | Status: SHIPPED | OUTPATIENT
Start: 2024-05-07 | End: 2024-05-14

## 2024-05-15 PROBLEM — D25.2 INTRAMURAL AND SUBSEROUS LEIOMYOMA OF UTERUS: Status: ACTIVE | Noted: 2024-05-15

## 2024-05-15 PROBLEM — Z98.890 HISTORY OF ENDOMETRIAL ABLATION: Status: ACTIVE | Noted: 2024-05-15

## 2024-05-15 PROBLEM — D25.1 INTRAMURAL AND SUBSEROUS LEIOMYOMA OF UTERUS: Status: ACTIVE | Noted: 2024-05-15

## 2024-06-01 NOTE — PATIENT INSTRUCTIONS
As discussed today we will order some blood work to check your menopausal status.  We will contact you with those results and recommendations for follow-up.

## 2024-06-01 NOTE — PROGRESS NOTES
[facility] - [place]  [address]  [address]  [facility] [place] [facility] [address]      DATE OF VISIT: 6/3/2024          History and Physical    Dang Downey    :  1970  CHIEF COMPLAINT:    Chief Complaint   Patient presents with    Consultation     Here for consult had U/S  and EMB 24  last pap 22 neg  had bleeding for 6-8 weeks                       HPI: Dang Downey is a 53 y.o.   Dang was seen for her annual women's wellness exam.  10/2023.  She was doing well at that time but returned to the office 5/3/2024 with a history of having 4-6 weeks of vaginal bleeding.  Prior to that she had had no vaginal bleeding for approximately 6 months.  She has a history of an ablation for abnormal bleeding 20 years ago but was having monthly cycles that were light and lasting 2 days.  Ultrasound was done showing uterine leiomyoma, otherwise unremarkable.  EMB was also done which was negative for atypia or malignancy.  She is primarily here today to discuss follow-up.  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  She was placed on Aygestin taper dose and states that she will be done today and she is not experiencing any vaginal bleeding.  Denies any abdominal/pelvic discomfort.  She is otherwise without any GYN complaints today.  Dang does have a family history of uterine cancer in her maternal grandmother and mother.  Mother is still living.  She is also being followed by nephrology for urinary microalbumin.        Reading Physician Reading Date Result Priority   Morena Zazueta MD  [phone] 2024 Routine     Narrative & Impression  US PELVIS COMPLETE NON OB TRANSABDOMINAL AND TRANSVAGINAL     UTERUS: 6.4 x 4.9 x 3.3 cm  Diffusely heterogenous  Intramural fibroid seen w/in the rt ut = 1.3 x 1.2 x 1.6 cm  Subserosal fibroid seen w/in the posterior ut = 3.5 x 2.3 x 2.5 cm

## 2024-06-03 ENCOUNTER — INITIAL CONSULT (OUTPATIENT)
Dept: OBGYN CLINIC | Age: 54
End: 2024-06-03
Payer: COMMERCIAL

## 2024-06-03 VITALS
BODY MASS INDEX: 33.33 KG/M2 | DIASTOLIC BLOOD PRESSURE: 78 MMHG | SYSTOLIC BLOOD PRESSURE: 120 MMHG | HEIGHT: 69 IN | WEIGHT: 225 LBS

## 2024-06-03 DIAGNOSIS — Z98.51 HISTORY OF BILATERAL TUBAL LIGATION: ICD-10-CM

## 2024-06-03 DIAGNOSIS — Z98.891 HISTORY OF 2 CESAREAN SECTIONS: ICD-10-CM

## 2024-06-03 DIAGNOSIS — I10 CHRONIC HYPERTENSION: ICD-10-CM

## 2024-06-03 DIAGNOSIS — Z87.19 HISTORY OF HERNIA REPAIR: ICD-10-CM

## 2024-06-03 DIAGNOSIS — Z98.890 HISTORY OF HERNIA REPAIR: ICD-10-CM

## 2024-06-03 DIAGNOSIS — D25.2 INTRAMURAL AND SUBSEROUS LEIOMYOMA OF UTERUS: ICD-10-CM

## 2024-06-03 DIAGNOSIS — N92.1 MENORRHAGIA WITH IRREGULAR CYCLE: Primary | ICD-10-CM

## 2024-06-03 DIAGNOSIS — R80.9 URINE TEST POSITIVE FOR MICROALBUMINURIA: ICD-10-CM

## 2024-06-03 DIAGNOSIS — E03.9 HYPOTHYROIDISM, UNSPECIFIED TYPE: ICD-10-CM

## 2024-06-03 DIAGNOSIS — Z98.890 HISTORY OF ENDOMETRIAL ABLATION: ICD-10-CM

## 2024-06-03 DIAGNOSIS — D25.1 INTRAMURAL AND SUBSEROUS LEIOMYOMA OF UTERUS: ICD-10-CM

## 2024-06-03 PROCEDURE — G8428 CUR MEDS NOT DOCUMENT: HCPCS | Performed by: OBSTETRICS & GYNECOLOGY

## 2024-06-03 PROCEDURE — 99213 OFFICE O/P EST LOW 20 MIN: CPT | Performed by: OBSTETRICS & GYNECOLOGY

## 2024-06-03 PROCEDURE — 3078F DIAST BP <80 MM HG: CPT | Performed by: OBSTETRICS & GYNECOLOGY

## 2024-06-03 PROCEDURE — 3074F SYST BP LT 130 MM HG: CPT | Performed by: OBSTETRICS & GYNECOLOGY

## 2024-06-03 PROCEDURE — G8417 CALC BMI ABV UP PARAM F/U: HCPCS | Performed by: OBSTETRICS & GYNECOLOGY

## 2024-06-07 ENCOUNTER — HOSPITAL ENCOUNTER (OUTPATIENT)
Facility: CLINIC | Age: 54
Discharge: HOME | End: 2024-06-07
Payer: COMMERCIAL

## 2024-06-07 ENCOUNTER — HOSPITAL ENCOUNTER (OUTPATIENT)
Dept: ULTRASOUND IMAGING | Facility: CLINIC | Age: 54
End: 2024-06-07
Attending: INTERNAL MEDICINE
Payer: COMMERCIAL

## 2024-06-07 ENCOUNTER — HOSPITAL ENCOUNTER (OUTPATIENT)
Dept: ULTRASOUND IMAGING | Facility: CLINIC | Age: 54
Discharge: HOME | End: 2024-06-07
Payer: COMMERCIAL

## 2024-06-07 DIAGNOSIS — N18.2 CKD (CHRONIC KIDNEY DISEASE), STAGE II: ICD-10-CM

## 2024-06-07 DIAGNOSIS — Z86.39 HISTORY OF HYPOKALEMIA: ICD-10-CM

## 2024-06-07 DIAGNOSIS — R80.9 PROTEINURIA, UNSPECIFIED TYPE: ICD-10-CM

## 2024-06-07 DIAGNOSIS — D25.2 INTRAMURAL AND SUBSEROUS LEIOMYOMA OF UTERUS: ICD-10-CM

## 2024-06-07 DIAGNOSIS — R19.00 ABDOMINAL SWELLING: ICD-10-CM

## 2024-06-07 DIAGNOSIS — E55.9 VITAMIN D DEFICIENCY: ICD-10-CM

## 2024-06-07 DIAGNOSIS — N92.1 MENORRHAGIA WITH IRREGULAR CYCLE: ICD-10-CM

## 2024-06-07 DIAGNOSIS — R80.9 MICROALBUMINURIA: ICD-10-CM

## 2024-06-07 DIAGNOSIS — D25.1 INTRAMURAL AND SUBSEROUS LEIOMYOMA OF UTERUS: ICD-10-CM

## 2024-06-07 LAB
25(OH)D3 SERPL-MCNC: 33.1 NG/ML (ref 30–100)
ALBUMIN PERCENT: ABNORMAL %
ALBUMIN SERPL-MCNC: ABNORMAL G/DL
ALPHA 2 PERCENT: ABNORMAL %
ALPHA1 GLOB SERPL ELPH-MCNC: ABNORMAL %
ALPHA1 GLOB SERPL ELPH-MCNC: ABNORMAL G/DL
ALPHA2 GLOB SERPL ELPH-MCNC: ABNORMAL G/DL
ANION GAP SERPL CALCULATED.3IONS-SCNC: 11 MMOL/L (ref 9–16)
B-GLOBULIN SERPL ELPH-MCNC: ABNORMAL %
B-GLOBULIN SERPL ELPH-MCNC: ABNORMAL G/DL
BACTERIA URNS QL MICRO: ABNORMAL
BASOPHILS # BLD: 0.05 K/UL (ref 0–0.2)
BASOPHILS NFR BLD: 1 % (ref 0–2)
BILIRUB UR QL STRIP: NEGATIVE
BUN SERPL-MCNC: 13 MG/DL (ref 6–20)
C3 SERPL-MCNC: 184 MG/DL (ref 90–180)
C4 SERPL-MCNC: 20 MG/DL (ref 10–40)
CALCIUM SERPL-MCNC: 9.4 MG/DL (ref 8.6–10.4)
CASTS #/AREA URNS LPF: ABNORMAL /LPF (ref 0–2)
CHLORIDE SERPL-SCNC: 99 MMOL/L (ref 98–107)
CLARITY UR: ABNORMAL
CO2 SERPL-SCNC: 26 MMOL/L (ref 20–31)
COLOR UR: YELLOW
CREAT SERPL-MCNC: 0.7 MG/DL (ref 0.5–0.9)
EOSINOPHIL # BLD: 0.27 K/UL (ref 0–0.44)
EOSINOPHILS RELATIVE PERCENT: 4 % (ref 1–4)
EPI CELLS #/AREA URNS HPF: ABNORMAL /HPF (ref 0–5)
ERYTHROCYTE [DISTWIDTH] IN BLOOD BY AUTOMATED COUNT: 12.9 % (ref 11.8–14.4)
FREE KAPPA/LAMBDA RATIO: 1.18 (ref 0.22–1.74)
FSH SERPL-ACNC: 13.8 MIU/ML
GAMMA GLOB SERPL ELPH-MCNC: ABNORMAL G/DL
GAMMA GLOBULIN %: ABNORMAL %
GFR, ESTIMATED: >90 ML/MIN/1.73M2
GLUCOSE SERPL-MCNC: 264 MG/DL (ref 74–99)
GLUCOSE UR STRIP-MCNC: ABNORMAL MG/DL
HAV IGM SERPL QL IA: NONREACTIVE
HBV CORE IGM SERPL QL IA: NONREACTIVE
HBV SURFACE AG SERPL QL IA: NONREACTIVE
HCT VFR BLD AUTO: 37.2 % (ref 36.3–47.1)
HCV AB SERPL QL IA: NONREACTIVE
HGB BLD-MCNC: 12.3 G/DL (ref 11.9–15.1)
HGB UR QL STRIP.AUTO: NEGATIVE
IMM GRANULOCYTES # BLD AUTO: <0.03 K/UL (ref 0–0.3)
IMM GRANULOCYTES NFR BLD: 0 %
KAPPA LC FREE SER-MCNC: 27.8 MG/L
KETONES UR STRIP-MCNC: NEGATIVE MG/DL
LAMBDA LC FREE SERPL-MCNC: 23.6 MG/L (ref 4.2–27.7)
LEUKOCYTE ESTERASE UR QL STRIP: ABNORMAL
LYMPHOCYTES NFR BLD: 1.74 K/UL (ref 1.1–3.7)
LYMPHOCYTES RELATIVE PERCENT: 26 % (ref 24–43)
M PROTEIN 2 SERPL ELPH-MCNC: ABNORMAL G/DL
M PROTEIN SERPL ELPH-MCNC: ABNORMAL G/DL
MAGNESIUM SERPL-MCNC: 1.7 MG/DL (ref 1.6–2.6)
MCH RBC QN AUTO: 27.5 PG (ref 25.2–33.5)
MCHC RBC AUTO-ENTMCNC: 33.1 G/DL (ref 28.4–34.8)
MCV RBC AUTO: 83 FL (ref 82.6–102.9)
MONOCYTES NFR BLD: 0.44 K/UL (ref 0.1–1.2)
MONOCYTES NFR BLD: 7 % (ref 3–12)
NEUTROPHILS NFR BLD: 62 % (ref 36–65)
NEUTS SEG NFR BLD: 4.07 K/UL (ref 1.5–8.1)
NITRITE UR QL STRIP: NEGATIVE
NRBC BLD-RTO: 0 PER 100 WBC
PATHOLOGIST: ABNORMAL
PH UR STRIP: 5.5 [PH] (ref 5–8)
PLATELET # BLD AUTO: 270 K/UL (ref 138–453)
PMV BLD AUTO: 9.5 FL (ref 8.1–13.5)
POTASSIUM SERPL-SCNC: 3.7 MMOL/L (ref 3.7–5.3)
PROT PATTERN SERPL ELPH-IMP: ABNORMAL
PROT SERPL-MCNC: 6.5 G/DL (ref 6.6–8.7)
PROT UR STRIP-MCNC: ABNORMAL MG/DL
RBC # BLD AUTO: 4.48 M/UL (ref 3.95–5.11)
RBC #/AREA URNS HPF: ABNORMAL /HPF (ref 0–2)
SODIUM SERPL-SCNC: 136 MMOL/L (ref 136–145)
SP GR UR STRIP: 1.03 (ref 1–1.03)
TOTAL PROT. SUM,%: ABNORMAL %
TOTAL PROT. SUM: ABNORMAL G/DL
TOTAL PROTEIN, URINE: 90 MG/DL
URINE TOTAL PROTEIN CREATININE RATIO: 0.82
UROBILINOGEN UR STRIP-ACNC: NORMAL EU/DL (ref 0–1)
WBC #/AREA URNS HPF: ABNORMAL /HPF (ref 0–5)
WBC OTHER # BLD: 6.6 K/UL (ref 3.5–11.3)
YEAST URNS QL MICRO: ABNORMAL

## 2024-06-07 PROCEDURE — 85025 COMPLETE CBC W/AUTO DIFF WBC: CPT

## 2024-06-07 PROCEDURE — 83735 ASSAY OF MAGNESIUM: CPT

## 2024-06-07 PROCEDURE — 80074 ACUTE HEPATITIS PANEL: CPT

## 2024-06-07 PROCEDURE — 83001 ASSAY OF GONADOTROPIN (FSH): CPT

## 2024-06-07 PROCEDURE — 80048 BASIC METABOLIC PNL TOTAL CA: CPT

## 2024-06-07 PROCEDURE — 86160 COMPLEMENT ANTIGEN: CPT

## 2024-06-07 PROCEDURE — 82306 VITAMIN D 25 HYDROXY: CPT

## 2024-06-07 PROCEDURE — 86038 ANTINUCLEAR ANTIBODIES: CPT

## 2024-06-07 PROCEDURE — 84166 PROTEIN E-PHORESIS/URINE/CSF: CPT

## 2024-06-07 PROCEDURE — 81001 URINALYSIS AUTO W/SCOPE: CPT

## 2024-06-07 PROCEDURE — 76705 ECHO EXAM OF ABDOMEN: CPT

## 2024-06-07 PROCEDURE — 76770 US EXAM ABDO BACK WALL COMP: CPT

## 2024-06-07 PROCEDURE — 84165 PROTEIN E-PHORESIS SERUM: CPT

## 2024-06-07 PROCEDURE — 86334 IMMUNOFIX E-PHORESIS SERUM: CPT

## 2024-06-07 PROCEDURE — 84300 ASSAY OF URINE SODIUM: CPT

## 2024-06-07 PROCEDURE — 82436 ASSAY OF URINE CHLORIDE: CPT

## 2024-06-07 PROCEDURE — 84156 ASSAY OF PROTEIN URINE: CPT

## 2024-06-07 PROCEDURE — 83521 IG LIGHT CHAINS FREE EACH: CPT

## 2024-06-07 PROCEDURE — 36415 COLL VENOUS BLD VENIPUNCTURE: CPT

## 2024-06-07 PROCEDURE — 86225 DNA ANTIBODY NATIVE: CPT

## 2024-06-07 PROCEDURE — 82570 ASSAY OF URINE CREATININE: CPT

## 2024-06-07 PROCEDURE — 84155 ASSAY OF PROTEIN SERUM: CPT

## 2024-06-08 LAB
PATHOLOGIST: NORMAL
SPECIMEN TYPE: NORMAL
URINE TOTAL PROTEIN: 90 MG/DL

## 2024-06-10 LAB
ALBUMIN PERCENT: 60 % (ref 45–65)
ALBUMIN SERPL-MCNC: 3.9 G/DL (ref 3.2–5.2)
ALPHA 2 PERCENT: 11 % (ref 6–13)
ALPHA1 GLOB SERPL ELPH-MCNC: 0.2 G/DL (ref 0.1–0.4)
ALPHA1 GLOB SERPL ELPH-MCNC: 3 % (ref 3–6)
ALPHA2 GLOB SERPL ELPH-MCNC: 0.7 G/DL (ref 0.5–0.9)
B-GLOBULIN SERPL ELPH-MCNC: 0.9 G/DL (ref 0.5–1.1)
B-GLOBULIN SERPL ELPH-MCNC: 15 % (ref 11–19)
GAMMA GLOB SERPL ELPH-MCNC: 0.7 G/DL (ref 0.5–1.5)
GAMMA GLOBULIN %: 11 % (ref 9–20)
ITYP INTERPRETATION: NORMAL
P E INTERPRETATION, U: NORMAL
PATH REV: NORMAL
PATHOLOGIST: ABNORMAL
PATHOLOGIST: NORMAL
PROT PATTERN SERPL ELPH-IMP: ABNORMAL
PROT SERPL-MCNC: 6.5 G/DL (ref 6.6–8.7)
SPECIMEN TYPE: NORMAL
TOTAL PROT. SUM,%: 100 % (ref 98–102)
TOTAL PROT. SUM: 6.4 G/DL (ref 6.3–8.2)
URINE TOTAL PROTEIN: 90 MG/DL

## 2024-06-11 ENCOUNTER — TELEPHONE (OUTPATIENT)
Dept: OBGYN CLINIC | Age: 54
End: 2024-06-11

## 2024-06-11 LAB
ANA SER QL IA: NEGATIVE
DSDNA IGG SER QL IA: <0.5 IU/ML
NUCLEAR IGG SER IA-RTO: 0.1 U/ML

## 2024-06-11 NOTE — TELEPHONE ENCOUNTER
Called Dang to recommend canceling pelvic ultrasound due to symptoms improved and no abnormality on ultrasound aside from the uterine fibroids  She is agreeable to this plan and states bleeding has stopped… Agrees to notify the office with any prolonged periods heavy bleeding or increased frequency of periods.  Annual scheduled for October    She notes urine test through one of her specialist did show yeast in the urine and she is wondering if we should treat with any medication    Per UpToDate    Asymptomatic candiduria Therapy is not usually indicated, unless patients are at high risk for dissemination (neutropenic patients, very low birthweight infants [<1500 g], patients undergoing urologic procedures).  Neutropenic patients and very low birthweight infants should be treated with similar regimens as patients with candidemia.*  Patients undergoing a urologic procedure: oral fluconazole 400 mg or amphotericin B deoxycholate¶ 0.3 to 0.6 mg/kg IV daily for several days before and after the procedure.

## 2024-06-12 LAB
ITYP INTERPRETATION: NORMAL
PATHOLOGIST REVIEW: NORMAL
SPECIMEN TYPE: NORMAL
SPECIMEN VOL UR: NORMAL ML
TOTAL PROTEIN, URINE: 90 MG/DL

## 2024-07-08 ENCOUNTER — INITIAL CONSULT (OUTPATIENT)
Dept: ONCOLOGY | Age: 54
End: 2024-07-08
Payer: COMMERCIAL

## 2024-07-08 ENCOUNTER — TELEPHONE (OUTPATIENT)
Dept: ONCOLOGY | Age: 54
End: 2024-07-08

## 2024-07-08 VITALS
RESPIRATION RATE: 16 BRPM | TEMPERATURE: 99.5 F | WEIGHT: 227.3 LBS | SYSTOLIC BLOOD PRESSURE: 125 MMHG | DIASTOLIC BLOOD PRESSURE: 79 MMHG | BODY MASS INDEX: 33.67 KG/M2 | HEART RATE: 98 BPM | HEIGHT: 69 IN

## 2024-07-08 DIAGNOSIS — D47.2 MGUS (MONOCLONAL GAMMOPATHY OF UNKNOWN SIGNIFICANCE): Primary | ICD-10-CM

## 2024-07-08 PROCEDURE — 99202 OFFICE O/P NEW SF 15 MIN: CPT | Performed by: INTERNAL MEDICINE

## 2024-07-08 NOTE — TELEPHONE ENCOUNTER
JAMES HERE FOR CONSULT   Rv in one year with cbc, cmp spep and light chains prior to RV   LABS PRINTED AND GIVEN ON EXIT   WRITER WILL CALL PT TO SCHEDULE ONCE CALENDAR IS MADE   AVS PRINTED AND GIVEN ON EXIT

## 2024-10-01 DIAGNOSIS — N89.8 VAGINAL ITCHING: Primary | ICD-10-CM

## 2024-10-01 RX ORDER — FLUCONAZOLE 150 MG/1
150 TABLET ORAL
Qty: 3 TABLET | Refills: 0 | Status: SHIPPED | OUTPATIENT
Start: 2024-10-01

## 2024-11-05 RX ORDER — FLUCONAZOLE 150 MG/1
TABLET ORAL
Qty: 3 TABLET | Refills: 0 | Status: SHIPPED | OUTPATIENT
Start: 2024-11-05

## 2025-01-15 ENCOUNTER — TELEPHONE (OUTPATIENT)
Dept: ONCOLOGY | Age: 55
End: 2025-01-15

## 2025-01-22 RX ORDER — LANCETS 30 GAUGE
EACH MISCELLANEOUS
Qty: 100 EACH | Refills: 0 | OUTPATIENT
Start: 2025-01-22

## 2025-02-26 ENCOUNTER — HOSPITAL ENCOUNTER (EMERGENCY)
Age: 55
Discharge: HOME | End: 2025-02-26
Attending: EMERGENCY MEDICINE
Payer: COMMERCIAL

## 2025-02-26 VITALS
TEMPERATURE: 97.5 F | HEART RATE: 91 BPM | OXYGEN SATURATION: 94 % | RESPIRATION RATE: 18 BRPM | DIASTOLIC BLOOD PRESSURE: 63 MMHG | SYSTOLIC BLOOD PRESSURE: 103 MMHG

## 2025-02-26 DIAGNOSIS — T88.7XXA MEDICATION SIDE EFFECT: ICD-10-CM

## 2025-02-26 DIAGNOSIS — R11.2 NAUSEA VOMITING AND DIARRHEA: Primary | ICD-10-CM

## 2025-02-26 DIAGNOSIS — R19.7 NAUSEA VOMITING AND DIARRHEA: Primary | ICD-10-CM

## 2025-02-26 LAB
ALBUMIN SERPL-MCNC: 3.9 G/DL (ref 3.5–5.2)
ALBUMIN/GLOB SERPL: 1.2 {RATIO} (ref 1–2.5)
ALP SERPL-CCNC: 98 U/L (ref 35–104)
ALT SERPL-CCNC: 24 U/L (ref 5–33)
ANION GAP SERPL CALCULATED.3IONS-SCNC: 17 MMOL/L (ref 9–17)
AST SERPL-CCNC: 23 U/L
BASOPHILS # BLD: 0 K/UL (ref 0–0.2)
BASOPHILS NFR BLD: 0 % (ref 0–2)
BILIRUB SERPL-MCNC: 0.2 MG/DL (ref 0.3–1.2)
BUN SERPL-MCNC: 16 MG/DL (ref 6–20)
CALCIUM SERPL-MCNC: 9.6 MG/DL (ref 8.6–10.4)
CHLORIDE SERPL-SCNC: 102 MMOL/L (ref 98–107)
CO2 SERPL-SCNC: 19 MMOL/L (ref 20–31)
CREAT SERPL-MCNC: 1 MG/DL (ref 0.5–0.9)
EOSINOPHIL # BLD: 0 K/UL (ref 0–0.4)
EOSINOPHILS RELATIVE PERCENT: 0 % (ref 1–4)
ERYTHROCYTE [DISTWIDTH] IN BLOOD BY AUTOMATED COUNT: 15.4 % (ref 12.5–15.4)
GFR, ESTIMATED: 67 ML/MIN/1.73M2
GLUCOSE SERPL-MCNC: 213 MG/DL (ref 70–99)
HCT VFR BLD AUTO: 38 % (ref 36–46)
HGB BLD-MCNC: 12.6 G/DL (ref 12–16)
LIPASE SERPL-CCNC: 48 U/L (ref 13–60)
LYMPHOCYTES NFR BLD: 0.89 K/UL (ref 1–4.8)
LYMPHOCYTES RELATIVE PERCENT: 5 % (ref 24–44)
MAGNESIUM SERPL-MCNC: 1.6 MG/DL (ref 1.6–2.6)
MCH RBC QN AUTO: 27.2 PG (ref 26–34)
MCHC RBC AUTO-ENTMCNC: 33.2 G/DL (ref 31–37)
MCV RBC AUTO: 82.2 FL (ref 80–100)
MONOCYTES NFR BLD: 1.77 K/UL (ref 0.1–0.8)
MONOCYTES NFR BLD: 10 % (ref 1–7)
MORPHOLOGY: NORMAL
NEUTROPHILS NFR BLD: 85 % (ref 36–66)
NEUTS SEG NFR BLD: 15.04 K/UL (ref 1.8–7.7)
PLATELET # BLD AUTO: 370 K/UL (ref 140–450)
PMV BLD AUTO: 7.3 FL (ref 6–12)
POTASSIUM SERPL-SCNC: 3.7 MMOL/L (ref 3.7–5.3)
PROT SERPL-MCNC: 7.1 G/DL (ref 6.4–8.3)
RBC # BLD AUTO: 4.63 M/UL (ref 4–5.2)
SODIUM SERPL-SCNC: 138 MMOL/L (ref 135–144)
WBC OTHER # BLD: 17.7 K/UL (ref 3.5–11)

## 2025-02-26 PROCEDURE — 36415 COLL VENOUS BLD VENIPUNCTURE: CPT

## 2025-02-26 PROCEDURE — 85025 COMPLETE CBC W/AUTO DIFF WBC: CPT

## 2025-02-26 PROCEDURE — 96361 HYDRATE IV INFUSION ADD-ON: CPT

## 2025-02-26 PROCEDURE — 6360000002 HC RX W HCPCS: Performed by: EMERGENCY MEDICINE

## 2025-02-26 PROCEDURE — 2500000003 HC RX 250 WO HCPCS: Performed by: EMERGENCY MEDICINE

## 2025-02-26 PROCEDURE — 2580000003 HC RX 258: Performed by: EMERGENCY MEDICINE

## 2025-02-26 PROCEDURE — 93005 ELECTROCARDIOGRAM TRACING: CPT | Performed by: EMERGENCY MEDICINE

## 2025-02-26 PROCEDURE — 96375 TX/PRO/DX INJ NEW DRUG ADDON: CPT

## 2025-02-26 PROCEDURE — 99284 EMERGENCY DEPT VISIT MOD MDM: CPT

## 2025-02-26 PROCEDURE — 96374 THER/PROPH/DIAG INJ IV PUSH: CPT

## 2025-02-26 PROCEDURE — 83735 ASSAY OF MAGNESIUM: CPT

## 2025-02-26 PROCEDURE — 80053 COMPREHEN METABOLIC PANEL: CPT

## 2025-02-26 PROCEDURE — 83690 ASSAY OF LIPASE: CPT

## 2025-02-26 RX ORDER — 0.9 % SODIUM CHLORIDE 0.9 %
1000 INTRAVENOUS SOLUTION INTRAVENOUS ONCE
Status: COMPLETED | OUTPATIENT
Start: 2025-02-26 | End: 2025-02-26

## 2025-02-26 RX ORDER — KETOROLAC TROMETHAMINE 30 MG/ML
30 INJECTION, SOLUTION INTRAMUSCULAR; INTRAVENOUS ONCE
Status: COMPLETED | OUTPATIENT
Start: 2025-02-26 | End: 2025-02-26

## 2025-02-26 RX ORDER — ONDANSETRON 2 MG/ML
4 INJECTION INTRAMUSCULAR; INTRAVENOUS ONCE
Status: COMPLETED | OUTPATIENT
Start: 2025-02-26 | End: 2025-02-26

## 2025-02-26 RX ORDER — ONDANSETRON 4 MG/1
4 TABLET, ORALLY DISINTEGRATING ORAL 3 TIMES DAILY PRN
Qty: 21 TABLET | Refills: 0 | Status: SHIPPED | OUTPATIENT
Start: 2025-02-26

## 2025-02-26 RX ADMIN — SODIUM CHLORIDE 1000 ML: 0.9 INJECTION, SOLUTION INTRAVENOUS at 12:41

## 2025-02-26 RX ADMIN — KETOROLAC TROMETHAMINE 30 MG: 30 INJECTION, SOLUTION INTRAMUSCULAR at 12:41

## 2025-02-26 RX ADMIN — SODIUM CHLORIDE 1000 ML: 9 INJECTION, SOLUTION INTRAVENOUS at 14:07

## 2025-02-26 RX ADMIN — FAMOTIDINE 20 MG: 10 INJECTION, SOLUTION INTRAVENOUS at 12:41

## 2025-02-26 RX ADMIN — ONDANSETRON 4 MG: 2 INJECTION, SOLUTION INTRAMUSCULAR; INTRAVENOUS at 12:40

## 2025-02-26 ASSESSMENT — ENCOUNTER SYMPTOMS
RHINORRHEA: 0
BLOOD IN STOOL: 0
EYE PAIN: 0
SHORTNESS OF BREATH: 0
BACK PAIN: 0
SORE THROAT: 0
NAUSEA: 1
VOMITING: 1
COUGH: 0
DIARRHEA: 1

## 2025-02-26 NOTE — ED PROVIDER NOTES
[facility] Emergency Department  [address]  [place] [address]  Phone: [phone]  Fax: [phone]    EMERGENCY DEPARTMENT ENCOUNTER          Pt Name: Dang Downey  MRN: [id number]  Birthdate 1970  Date of evaluation: 2025      CHIEF COMPLAINT       Chief Complaint   Patient presents with    Vomiting       HISTORY OF PRESENT ILLNESS       Dang Downey is a 54 y.o. female who presents with nausea vomiting diarrhea that began while at work this morning.  Patient states she feels it is secondary to Mounjaro that she just started at 2.5 mg.  She took her first dose on Monday.  She was on semaglutide previously and states she had a similar reaction and the reaction always started 48 hours after she took the dose similar to this reaction and timeframe.  She states that semaglutide helped her diabetes control very well however due to side effects her PCP switched her to tirzepatide.  She does report some mild abdominal cramping but otherwise no pain.  No other symptoms or concerns.    REVIEW OF SYSTEMS       Review of Systems   Constitutional:  Negative for chills, fatigue and fever.   HENT:  Negative for rhinorrhea and sore throat.    Eyes:  Negative for pain.   Respiratory:  Negative for cough and shortness of breath.    Cardiovascular:  Negative for chest pain.   Gastrointestinal:  Positive for diarrhea, nausea and vomiting. Negative for blood in stool.   Genitourinary:  Negative for difficulty urinating and dysuria.   Musculoskeletal:  Negative for back pain and neck pain.   Skin:  Negative for rash.   Neurological:  Negative for weakness and headaches.        PAST MEDICAL HISTORY    has a past medical history of Diabetes mellitus type 2, insulin dependent (HCC), GERD (gastroesophageal reflux disease), High cholesterol, Hypertension, and Hypothyroid.    SURGICAL HISTORY      has a past surgical history that includes  section (); Tubal ligation;

## 2025-02-26 NOTE — DISCHARGE INSTRUCTIONS
PLEASE RETURN TO THE EMERGENCY DEPARTMENT IMMEDIATELY if your symptoms worsen in anyway or in 24 hours if not improved for re-evaluation.  You should immediately return to the ER for symptoms such as new or worsening abdominal pain, bloody stool, fever, a feeling of passing out, chest pain, shortness of breath, persistent nausea and/or vomiting, numbness or weakness to the arms or legs, coolness or color change of the arms or legs.      Take your medication as indicated and prescribed.  If you are given an antibiotic then, make sure you get the prescription filled and take the antibiotics until finished.      Please understand that at this time there is no evidence for a more serious underlying process, but that early in the process of an illness or injury, an emergency department workup can be falsely reassuring.  You should contact your family doctor within the next 48 hours for a follow up appointment    THANK YOU!!!    From [facility] and [facility]    On behalf of the Emergency Department staff at [facility], I would like to thank you for giving us the opportunity to address your health care needs and concerns.    We hope that during your visit, our service was delivered in a professional and caring manner. Please keep [facility] in mind as we walk with you down the path to your own personal wellness.     Please expect an automated text message or email from us so we can ask a few questions about your health and progress. Based on your answers, a clinician may call you back to offer help and instructions.    Please understand that early in the process of an illness or injury, an emergency department workup can be falsely reassuring.  If you notice any worsening, changing or persistent symptoms please call your family doctor or return to the ER immediately.     Tell us how we did during your visit at [email or web address]   and let us know about your experience

## 2025-02-27 LAB
EKG ATRIAL RATE: 87 BPM
EKG P AXIS: 41 DEGREES
EKG P-R INTERVAL: 182 MS
EKG Q-T INTERVAL: 432 MS
EKG QRS DURATION: 106 MS
EKG QTC CALCULATION (BAZETT): 519 MS
EKG R AXIS: -30 DEGREES
EKG T AXIS: 45 DEGREES
EKG VENTRICULAR RATE: 87 BPM

## 2025-04-07 RX ORDER — ATORVASTATIN CALCIUM 20 MG/1
20 TABLET, FILM COATED ORAL DAILY
Qty: 90 TABLET | Refills: 3 | OUTPATIENT
Start: 2025-04-07

## 2025-04-07 RX ORDER — LEVOTHYROXINE SODIUM 88 UG/1
88 TABLET ORAL DAILY
Qty: 90 TABLET | Refills: 3 | OUTPATIENT
Start: 2025-04-07

## 2025-07-11 ENCOUNTER — HOSPITAL ENCOUNTER (OUTPATIENT)
Age: 55
Discharge: HOME | End: 2025-07-11
Payer: COMMERCIAL

## 2025-07-11 DIAGNOSIS — D47.2 MGUS (MONOCLONAL GAMMOPATHY OF UNKNOWN SIGNIFICANCE): Primary | ICD-10-CM

## 2025-07-11 DIAGNOSIS — D47.2 MGUS (MONOCLONAL GAMMOPATHY OF UNKNOWN SIGNIFICANCE): ICD-10-CM

## 2025-07-11 LAB
ALBUMIN PERCENT: ABNORMAL %
ALBUMIN SERPL-MCNC: 3.7 G/DL (ref 3.5–5.2)
ALBUMIN SERPL-MCNC: ABNORMAL G/DL
ALBUMIN/GLOB SERPL: 1.4 {RATIO} (ref 1–2.5)
ALP SERPL-CCNC: 72 U/L (ref 35–104)
ALPHA 2 PERCENT: ABNORMAL %
ALPHA1 GLOB SERPL ELPH-MCNC: ABNORMAL %
ALPHA1 GLOB SERPL ELPH-MCNC: ABNORMAL G/DL
ALPHA2 GLOB SERPL ELPH-MCNC: ABNORMAL G/DL
ALT SERPL-CCNC: 29 U/L (ref 10–35)
ANION GAP SERPL CALCULATED.3IONS-SCNC: 14 MMOL/L (ref 9–16)
AST SERPL-CCNC: 25 U/L (ref 10–35)
B-GLOBULIN SERPL ELPH-MCNC: ABNORMAL %
B-GLOBULIN SERPL ELPH-MCNC: ABNORMAL G/DL
BASOPHILS # BLD: 0.04 K/UL (ref 0–0.2)
BASOPHILS NFR BLD: 0 % (ref 0–2)
BILIRUB SERPL-MCNC: <0.2 MG/DL (ref 0–1.2)
BUN SERPL-MCNC: 12 MG/DL (ref 6–20)
CALCIUM SERPL-MCNC: 9.2 MG/DL (ref 8.6–10.4)
CHLORIDE SERPL-SCNC: 100 MMOL/L (ref 98–107)
CO2 SERPL-SCNC: 25 MMOL/L (ref 20–31)
CREAT SERPL-MCNC: 0.6 MG/DL (ref 0.5–0.9)
EOSINOPHIL # BLD: 0.45 K/UL (ref 0–0.44)
EOSINOPHILS RELATIVE PERCENT: 4 % (ref 1–4)
ERYTHROCYTE [DISTWIDTH] IN BLOOD BY AUTOMATED COUNT: 13.1 % (ref 11.8–14.4)
FREE KAPPA/LAMBDA RATIO: 0.84 (ref 0.22–1.74)
GAMMA GLOB SERPL ELPH-MCNC: ABNORMAL G/DL
GAMMA GLOBULIN %: ABNORMAL %
GFR, ESTIMATED: >90 ML/MIN/1.73M2
GLUCOSE SERPL-MCNC: 120 MG/DL (ref 74–99)
HCT VFR BLD AUTO: 33.7 % (ref 36.3–47.1)
HGB BLD-MCNC: 11.7 G/DL (ref 11.9–15.1)
IMM GRANULOCYTES # BLD AUTO: 0.04 K/UL (ref 0–0.3)
IMM GRANULOCYTES NFR BLD: 0 %
KAPPA LC FREE SER-MCNC: 31.3 MG/L
LAMBDA LC FREE SERPL-MCNC: 37.4 MG/L (ref 4.2–27.7)
LYMPHOCYTES NFR BLD: 1.92 K/UL (ref 1.1–3.7)
LYMPHOCYTES RELATIVE PERCENT: 19 % (ref 24–43)
M PROTEIN 2 SERPL ELPH-MCNC: ABNORMAL G/DL
M PROTEIN SERPL ELPH-MCNC: ABNORMAL G/DL
MCH RBC QN AUTO: 28.7 PG (ref 25.2–33.5)
MCHC RBC AUTO-ENTMCNC: 34.7 G/DL (ref 28.4–34.8)
MCV RBC AUTO: 82.8 FL (ref 82.6–102.9)
MONOCYTES NFR BLD: 0.55 K/UL (ref 0.1–1.2)
MONOCYTES NFR BLD: 5 % (ref 3–12)
NEUTROPHILS NFR BLD: 72 % (ref 36–65)
NEUTS SEG NFR BLD: 7.38 K/UL (ref 1.5–8.1)
NRBC BLD-RTO: 0 PER 100 WBC
PATHOLOGIST: ABNORMAL
PLATELET # BLD AUTO: 254 K/UL (ref 138–453)
PMV BLD AUTO: 9.1 FL (ref 8.1–13.5)
POTASSIUM SERPL-SCNC: 3 MMOL/L (ref 3.7–5.3)
PROT PATTERN SERPL ELPH-IMP: ABNORMAL
PROT SERPL-MCNC: 6.2 G/DL (ref 6.6–8.7)
PROT SERPL-MCNC: 6.4 G/DL (ref 6.6–8.7)
RBC # BLD AUTO: 4.07 M/UL (ref 3.95–5.11)
SODIUM SERPL-SCNC: 139 MMOL/L (ref 136–145)
TOTAL PROT. SUM,%: ABNORMAL %
TOTAL PROT. SUM: ABNORMAL G/DL
WBC OTHER # BLD: 10.4 K/UL (ref 3.5–11.3)

## 2025-07-11 PROCEDURE — 84155 ASSAY OF PROTEIN SERUM: CPT

## 2025-07-11 PROCEDURE — 80053 COMPREHEN METABOLIC PANEL: CPT

## 2025-07-11 PROCEDURE — 36415 COLL VENOUS BLD VENIPUNCTURE: CPT

## 2025-07-11 PROCEDURE — 85025 COMPLETE CBC W/AUTO DIFF WBC: CPT

## 2025-07-11 PROCEDURE — 84165 PROTEIN E-PHORESIS SERUM: CPT

## 2025-07-11 PROCEDURE — 83521 IG LIGHT CHAINS FREE EACH: CPT

## 2025-07-14 ENCOUNTER — HOSPITAL ENCOUNTER (OUTPATIENT)
Facility: MEDICAL CENTER | Age: 55
End: 2025-07-14

## 2025-07-14 ENCOUNTER — TELEPHONE (OUTPATIENT)
Age: 55
End: 2025-07-14

## 2025-07-14 ENCOUNTER — OFFICE VISIT (OUTPATIENT)
Age: 55
End: 2025-07-14
Payer: COMMERCIAL

## 2025-07-14 VITALS
BODY MASS INDEX: 34.69 KG/M2 | SYSTOLIC BLOOD PRESSURE: 135 MMHG | WEIGHT: 234.9 LBS | RESPIRATION RATE: 16 BRPM | DIASTOLIC BLOOD PRESSURE: 80 MMHG | TEMPERATURE: 98 F | HEART RATE: 67 BPM

## 2025-07-14 DIAGNOSIS — D47.2 MGUS (MONOCLONAL GAMMOPATHY OF UNKNOWN SIGNIFICANCE): Primary | ICD-10-CM

## 2025-07-14 LAB
ALBUMIN PERCENT: 58 % (ref 56–66)
ALBUMIN SERPL-MCNC: 3.6 G/DL (ref 3.2–5.2)
ALPHA 2 PERCENT: 12 % (ref 7–12)
ALPHA1 GLOB SERPL ELPH-MCNC: 0.3 G/DL (ref 0.1–0.4)
ALPHA1 GLOB SERPL ELPH-MCNC: 5 % (ref 3–5)
ALPHA2 GLOB SERPL ELPH-MCNC: 0.7 G/DL (ref 0.5–0.9)
B-GLOBULIN SERPL ELPH-MCNC: 0.8 G/DL (ref 0.7–1.4)
B-GLOBULIN SERPL ELPH-MCNC: 14 % (ref 8–13)
GAMMA GLOB SERPL ELPH-MCNC: 0.8 G/DL (ref 0.5–1.5)
GAMMA GLOBULIN %: 12 % (ref 11–19)
PATHOLOGIST: ABNORMAL
PROT PATTERN SERPL ELPH-IMP: ABNORMAL
PROT SERPL-MCNC: 6.2 G/DL (ref 6.6–8.7)
TOTAL PROT. SUM,%: 101 % (ref 98–102)
TOTAL PROT. SUM: 6.2 G/DL (ref 6.3–8.2)

## 2025-07-14 PROCEDURE — G8417 CALC BMI ABV UP PARAM F/U: HCPCS | Performed by: INTERNAL MEDICINE

## 2025-07-14 PROCEDURE — 1036F TOBACCO NON-USER: CPT | Performed by: INTERNAL MEDICINE

## 2025-07-14 PROCEDURE — 3017F COLORECTAL CA SCREEN DOC REV: CPT | Performed by: INTERNAL MEDICINE

## 2025-07-14 PROCEDURE — G8427 DOCREV CUR MEDS BY ELIG CLIN: HCPCS | Performed by: INTERNAL MEDICINE

## 2025-07-14 PROCEDURE — 3079F DIAST BP 80-89 MM HG: CPT | Performed by: INTERNAL MEDICINE

## 2025-07-14 PROCEDURE — 99214 OFFICE O/P EST MOD 30 MIN: CPT | Performed by: INTERNAL MEDICINE

## 2025-07-14 PROCEDURE — 3075F SYST BP GE 130 - 139MM HG: CPT | Performed by: INTERNAL MEDICINE

## 2025-07-14 RX ORDER — CARVEDILOL 6.25 MG/1
6.25 TABLET ORAL 2 TIMES DAILY WITH MEALS
COMMUNITY
Start: 2025-04-04

## 2025-07-14 RX ORDER — LISINOPRIL AND HYDROCHLOROTHIAZIDE 20; 25 MG/1; MG/1
1 TABLET ORAL DAILY
COMMUNITY
Start: 2025-05-02

## 2025-07-14 RX ORDER — DULAGLUTIDE 1.5 MG/.5ML
INJECTION, SOLUTION SUBCUTANEOUS
COMMUNITY

## 2025-07-14 NOTE — PROGRESS NOTES
DIAGNOSIS:   Monoclonal gammopathy likely MGUS  CURRENT THERAPY:  Surveillance  BRIEF CASE HISTORY:   Dang Downey is a very pleasant 55 y.o. female who is referred to us for management recommendation regarding monoclonal gammopathy.  This was detected as part of the renal workup.  She is diabetic and she is on metformin.  She was also on Ozempic.  As part of the workup, she was referred to nephrology to evaluate her kidney function.  It showed small monoclonal protein.  Patient has family history of multiple myeloma.  She is sent to us for a consultation, she is quite nervous about the findings otherwise feels well.  She is fairly active without any limitations.  INTERIM HISTORY  Patient is seen and evaluated.  Feels well.  Denies any fever chills night sweats, denies any bone pain.  No new symptoms.  PAST MEDICAL HISTORY: has a past medical history of Diabetes mellitus type 2, insulin dependent (HCC), GERD (gastroesophageal reflux disease), High cholesterol, Hypertension, and Hypothyroid.    PAST SURGICAL HISTORY: has a past surgical history that includes  section (); Tubal ligation;  section (); Endometrial ablation; Umbilical hernia repair; and Colonoscopy (2021).     CURRENT MEDICATIONS:  has a current medication list which includes the following prescription(s): carvedilol, lisinopril-hydrochlorothiazide, magnesium chloride-calcium carbonate, levothyroxine, aspirin, omeprazole, metformin, atorvastatin, blood glucose test strips, lancets, one touch ultra 2, icosapent ethyl, insulin lispro (1 unit dial), lantus solostar, trueplus pen needles, albuterol sulfate hfa, ondansetron, cholecalciferol, vitamin b-12, prodigy lancets 28g, therapeutic multivitamin-minerals, and trulicity.    ALLERGIES:  is allergic to ozempic (0.25 or 0.5 mg-dose) [semaglutide(0.25 or 0.5mg-dos)], codeine, and tricor [fenofibrate].    FAMILY HISTORY: Negative for any hematological or oncological

## 2025-07-14 NOTE — TELEPHONE ENCOUNTER
JAMES HERE FOR FOLLOW UP   Rv in 1 year with cbc, cmp spep and light chains prior to RV   MD VISIT: 7/13/25 @ 4PM   LABS WILL BE MAILED TO PT   AVS PRINTED AND GIVEN ON EXIT